# Patient Record
Sex: FEMALE | Race: WHITE | NOT HISPANIC OR LATINO | URBAN - METROPOLITAN AREA
[De-identification: names, ages, dates, MRNs, and addresses within clinical notes are randomized per-mention and may not be internally consistent; named-entity substitution may affect disease eponyms.]

---

## 2017-04-30 ENCOUNTER — EMERGENCY (EMERGENCY)
Facility: HOSPITAL | Age: 47
LOS: 1 days | Discharge: HOME | End: 2017-04-30
Attending: EMERGENCY MEDICINE | Admitting: EMERGENCY MEDICINE
Payer: SELF-PAY

## 2017-04-30 VITALS
TEMPERATURE: 98 F | DIASTOLIC BLOOD PRESSURE: 120 MMHG | SYSTOLIC BLOOD PRESSURE: 190 MMHG | OXYGEN SATURATION: 98 % | HEART RATE: 66 BPM | WEIGHT: 177.91 LBS

## 2017-04-30 VITALS
SYSTOLIC BLOOD PRESSURE: 161 MMHG | OXYGEN SATURATION: 99 % | DIASTOLIC BLOOD PRESSURE: 90 MMHG | RESPIRATION RATE: 18 BRPM | HEART RATE: 66 BPM

## 2017-04-30 DIAGNOSIS — Z98.89 OTHER SPECIFIED POSTPROCEDURAL STATES: Chronic | ICD-10-CM

## 2017-04-30 DIAGNOSIS — Z90.49 ACQUIRED ABSENCE OF OTHER SPECIFIED PARTS OF DIGESTIVE TRACT: Chronic | ICD-10-CM

## 2017-04-30 DIAGNOSIS — N83.202 UNSPECIFIED OVARIAN CYST, LEFT SIDE: ICD-10-CM

## 2017-04-30 LAB
ABO RH CONFIRMATION: SIGNIFICANT CHANGE UP
ALBUMIN SERPL ELPH-MCNC: 4 G/DL — SIGNIFICANT CHANGE UP (ref 3.3–5.2)
ALP SERPL-CCNC: 75 U/L — SIGNIFICANT CHANGE UP (ref 40–120)
ALT FLD-CCNC: 17 U/L — SIGNIFICANT CHANGE UP
ANION GAP SERPL CALC-SCNC: 14 MMOL/L — SIGNIFICANT CHANGE UP (ref 5–17)
APPEARANCE UR: CLEAR — SIGNIFICANT CHANGE UP
APTT BLD: 30.4 SEC — SIGNIFICANT CHANGE UP (ref 27.5–37.4)
AST SERPL-CCNC: 14 U/L — SIGNIFICANT CHANGE UP
BACTERIA # UR AUTO: ABNORMAL
BASOPHILS # BLD AUTO: 0 K/UL — SIGNIFICANT CHANGE UP (ref 0–0.2)
BASOPHILS NFR BLD AUTO: 0.1 % — SIGNIFICANT CHANGE UP (ref 0–2)
BILIRUB SERPL-MCNC: 0.3 MG/DL — LOW (ref 0.4–2)
BILIRUB UR-MCNC: NEGATIVE — SIGNIFICANT CHANGE UP
BLD GP AB SCN SERPL QL: SIGNIFICANT CHANGE UP
BUN SERPL-MCNC: 12 MG/DL — SIGNIFICANT CHANGE UP (ref 8–20)
CALCIUM SERPL-MCNC: 8.4 MG/DL — LOW (ref 8.6–10.2)
CHLORIDE SERPL-SCNC: 103 MMOL/L — SIGNIFICANT CHANGE UP (ref 98–107)
CO2 SERPL-SCNC: 22 MMOL/L — SIGNIFICANT CHANGE UP (ref 22–29)
COLOR SPEC: YELLOW — SIGNIFICANT CHANGE UP
CREAT SERPL-MCNC: 0.67 MG/DL — SIGNIFICANT CHANGE UP (ref 0.5–1.3)
DIFF PNL FLD: ABNORMAL
EOSINOPHIL # BLD AUTO: 0.1 K/UL — SIGNIFICANT CHANGE UP (ref 0–0.5)
EOSINOPHIL NFR BLD AUTO: 0.4 % — SIGNIFICANT CHANGE UP (ref 0–6)
EPI CELLS # UR: SIGNIFICANT CHANGE UP
GLUCOSE SERPL-MCNC: 118 MG/DL — HIGH (ref 70–115)
GLUCOSE UR QL: NEGATIVE MG/DL — SIGNIFICANT CHANGE UP
HCG SERPL-ACNC: <2 MIU/ML — SIGNIFICANT CHANGE UP
HCT VFR BLD CALC: 42 % — SIGNIFICANT CHANGE UP (ref 37–47)
HGB BLD-MCNC: 13.6 G/DL — SIGNIFICANT CHANGE UP (ref 12–16)
INR BLD: 0.96 RATIO — SIGNIFICANT CHANGE UP (ref 0.88–1.16)
KETONES UR-MCNC: ABNORMAL
LEUKOCYTE ESTERASE UR-ACNC: NEGATIVE — SIGNIFICANT CHANGE UP
LYMPHOCYTES # BLD AUTO: 1.2 K/UL — SIGNIFICANT CHANGE UP (ref 1–4.8)
LYMPHOCYTES # BLD AUTO: 7.8 % — LOW (ref 20–55)
MCHC RBC-ENTMCNC: 25.9 PG — LOW (ref 27–31)
MCHC RBC-ENTMCNC: 32.4 G/DL — SIGNIFICANT CHANGE UP (ref 32–36)
MCV RBC AUTO: 79.8 FL — LOW (ref 81–99)
MONOCYTES # BLD AUTO: 0.5 K/UL — SIGNIFICANT CHANGE UP (ref 0–0.8)
MONOCYTES NFR BLD AUTO: 3.1 % — SIGNIFICANT CHANGE UP (ref 3–10)
NEUTROPHILS # BLD AUTO: 13.2 K/UL — HIGH (ref 1.8–8)
NEUTROPHILS NFR BLD AUTO: 88.3 % — HIGH (ref 37–73)
NITRITE UR-MCNC: NEGATIVE — SIGNIFICANT CHANGE UP
PH UR: 6 — SIGNIFICANT CHANGE UP (ref 5–8)
PLATELET # BLD AUTO: 228 K/UL — SIGNIFICANT CHANGE UP (ref 150–400)
POTASSIUM SERPL-MCNC: 3.5 MMOL/L — SIGNIFICANT CHANGE UP (ref 3.5–5.3)
POTASSIUM SERPL-SCNC: 3.5 MMOL/L — SIGNIFICANT CHANGE UP (ref 3.5–5.3)
PROT SERPL-MCNC: 7.6 G/DL — SIGNIFICANT CHANGE UP (ref 6.6–8.7)
PROT UR-MCNC: 30 MG/DL
PROTHROM AB SERPL-ACNC: 10.6 SEC — SIGNIFICANT CHANGE UP (ref 9.8–12.7)
RBC # BLD: 5.26 M/UL — HIGH (ref 4.4–5.2)
RBC # FLD: 16.1 % — HIGH (ref 11–15.6)
RBC CASTS # UR COMP ASSIST: >50 /HPF (ref 0–4)
SODIUM SERPL-SCNC: 139 MMOL/L — SIGNIFICANT CHANGE UP (ref 135–145)
SP GR SPEC: 1.02 — SIGNIFICANT CHANGE UP (ref 1.01–1.02)
TYPE + AB SCN PNL BLD: SIGNIFICANT CHANGE UP
UROBILINOGEN FLD QL: NEGATIVE MG/DL — SIGNIFICANT CHANGE UP
WBC # BLD: 15 K/UL — HIGH (ref 4.8–10.8)
WBC # FLD AUTO: 15 K/UL — HIGH (ref 4.8–10.8)
WBC UR QL: SIGNIFICANT CHANGE UP

## 2017-04-30 PROCEDURE — 86850 RBC ANTIBODY SCREEN: CPT

## 2017-04-30 PROCEDURE — 76856 US EXAM PELVIC COMPLETE: CPT

## 2017-04-30 PROCEDURE — 76830 TRANSVAGINAL US NON-OB: CPT | Mod: 26

## 2017-04-30 PROCEDURE — 76830 TRANSVAGINAL US NON-OB: CPT

## 2017-04-30 PROCEDURE — 76856 US EXAM PELVIC COMPLETE: CPT | Mod: 26

## 2017-04-30 PROCEDURE — 85610 PROTHROMBIN TIME: CPT

## 2017-04-30 PROCEDURE — 86900 BLOOD TYPING SEROLOGIC ABO: CPT

## 2017-04-30 PROCEDURE — 96375 TX/PRO/DX INJ NEW DRUG ADDON: CPT

## 2017-04-30 PROCEDURE — 86901 BLOOD TYPING SEROLOGIC RH(D): CPT

## 2017-04-30 PROCEDURE — 99284 EMERGENCY DEPT VISIT MOD MDM: CPT | Mod: 25

## 2017-04-30 PROCEDURE — 96374 THER/PROPH/DIAG INJ IV PUSH: CPT

## 2017-04-30 PROCEDURE — 80053 COMPREHEN METABOLIC PANEL: CPT

## 2017-04-30 PROCEDURE — 84702 CHORIONIC GONADOTROPIN TEST: CPT

## 2017-04-30 PROCEDURE — 36415 COLL VENOUS BLD VENIPUNCTURE: CPT

## 2017-04-30 PROCEDURE — 85730 THROMBOPLASTIN TIME PARTIAL: CPT

## 2017-04-30 PROCEDURE — 85027 COMPLETE CBC AUTOMATED: CPT

## 2017-04-30 PROCEDURE — 81001 URINALYSIS AUTO W/SCOPE: CPT

## 2017-04-30 PROCEDURE — 99285 EMERGENCY DEPT VISIT HI MDM: CPT

## 2017-04-30 RX ORDER — IBUPROFEN 200 MG
1 TABLET ORAL
Qty: 15 | Refills: 0 | OUTPATIENT
Start: 2017-04-30 | End: 2017-05-05

## 2017-04-30 RX ORDER — ONDANSETRON 8 MG/1
4 TABLET, FILM COATED ORAL ONCE
Qty: 0 | Refills: 0 | Status: COMPLETED | OUTPATIENT
Start: 2017-04-30 | End: 2017-04-30

## 2017-04-30 RX ORDER — HYDROMORPHONE HYDROCHLORIDE 2 MG/ML
1 INJECTION INTRAMUSCULAR; INTRAVENOUS; SUBCUTANEOUS ONCE
Qty: 0 | Refills: 0 | Status: DISCONTINUED | OUTPATIENT
Start: 2017-04-30 | End: 2017-04-30

## 2017-04-30 RX ORDER — SODIUM CHLORIDE 9 MG/ML
1000 INJECTION INTRAMUSCULAR; INTRAVENOUS; SUBCUTANEOUS
Qty: 0 | Refills: 0 | Status: DISCONTINUED | OUTPATIENT
Start: 2017-04-30 | End: 2017-04-30

## 2017-04-30 RX ORDER — KETOROLAC TROMETHAMINE 30 MG/ML
30 SYRINGE (ML) INJECTION ONCE
Qty: 0 | Refills: 0 | Status: DISCONTINUED | OUTPATIENT
Start: 2017-04-30 | End: 2017-04-30

## 2017-04-30 RX ORDER — MORPHINE SULFATE 50 MG/1
6 CAPSULE, EXTENDED RELEASE ORAL ONCE
Qty: 0 | Refills: 0 | Status: DISCONTINUED | OUTPATIENT
Start: 2017-04-30 | End: 2017-04-30

## 2017-04-30 RX ADMIN — MORPHINE SULFATE 6 MILLIGRAM(S): 50 CAPSULE, EXTENDED RELEASE ORAL at 11:04

## 2017-04-30 RX ADMIN — ONDANSETRON 4 MILLIGRAM(S): 8 TABLET, FILM COATED ORAL at 11:03

## 2017-04-30 RX ADMIN — Medication 30 MILLIGRAM(S): at 11:03

## 2017-04-30 RX ADMIN — HYDROMORPHONE HYDROCHLORIDE 1 MILLIGRAM(S): 2 INJECTION INTRAMUSCULAR; INTRAVENOUS; SUBCUTANEOUS at 13:47

## 2017-04-30 RX ADMIN — Medication 0.1 MILLIGRAM(S): at 11:06

## 2017-04-30 NOTE — ED STATDOCS - PROGRESS NOTE DETAILS
patient re-evaluated c/o vaginal bleeding x 1 day, states usually gets cramping during her menses, however pain worsened today, took 400mg of IBU with no relief x 12 hours ago, she notes has D&C Karl with GYN  for "fibroids."  She admits to nausea.  She feels dehydrated and weak.  She notes has been changing her pads every hour and passing clots.  She denies any fevers, sick contacts or recent travel or rashes.  PE: pelvic exam: +pooling of blood noted with clots at cervix, +adenexal tenderness, no foul odor, no CMT.  Pending US results. US shows 2 complex cysts, patient recently lost insurance, reports pain calmed down however still in discomfort, "states feels as though having contractions" will call GYN for evaluation. GYN evaluated patient will speak to attending Gus GYN evaluated patient will speak to attending Gus, blood pressure improved, will need to f/u with [facility] GYN consulted appreciated to f/u with [facility] and IBU PRN as needed.

## 2017-04-30 NOTE — CONSULT NOTE ADULT - ASSESSMENT
47 y/o  w PMH of HTN (not compliant w medications since she no longer has medical insurance), chronic smoker (0.5-1ppd x 30 years), EtOH use socially and recreational marijuana use w lower left abdominal pain.

## 2017-04-30 NOTE — ED STATDOCS - OBJECTIVE STATEMENT
45 y/o female smoker with hx of D&C (January 15 2016) and HTN (on no medication) presents to ED, with family at bedside, c/o vaginal bleed, severe abd cramps, vomiting, nausea with associated light-headedness and diaphoresis onset yesterday. The cramps, nausea and vomiting, started shortly after the vaginal bleed. Pt reports she has decreased appetite secondary to pain. She states that she has had abd cramps with her period previous, but this pain is severe. Denies dysuria, fever, chills, diarrhea, back pain. She states she may be pregnant. No further complaints at this time. OBGYN: Rodo Carr

## 2017-04-30 NOTE — CONSULT NOTE ADULT - PROBLEM SELECTOR RECOMMENDATION 9
Patient needs to follow up outpatient w Gyn ([facility] clinic if patient chooses to since she does not have insurance) for follow up sonogram in 2 months to evaluate for resolution of cysts.  ([facility] [phone]; located at [facility], [place], [place])  Ibuprofen 600mg PO Q6h PRN for pain  Drink plenty of fluids

## 2017-04-30 NOTE — CONSULT NOTE ADULT - SUBJECTIVE AND OBJECTIVE BOX
45 y/o female w PMH of HTN (not compliant w medications since she no longer has medical insurance), chronic smoker (0.5-1ppd x 30 years), EtOH use socially and recreational marijuana use comes in c/o Left LQ abdominal pain for the past day.  Patient states that her period started yesterday and she was having crampy abdominal pain all day but the pain started to intensify all day and at night when she was trying to go to sleep, the pain increased to a point of 10/10 in intensity, non-radiating, crampy pain that would not alleviate w ibuprofen.   The pain was causing her to feel nauseas and she vomited once this A.M.   Patient states she finally could not resist the pain any longer and decided to come into the ED for evaluation.    Vital Signs Last 24 Hrs  T(C): 36.4, Max: 36.4 (04-30 @ 09:25)  T(F): 97.6, Max: 97.6 (04-30 @ 09:25)  HR: 66 (66 - 66)  BP: 161/90 (161/90 - 190/120)  RR: 18 (18 - 18)  SpO2: 99% (98% - 99%)    Constitutional: Obese female patient in NAD, looks comfortable and is very talkative.    HEENT:  EOMI, clear conjunctiva,    Neck:  No JVD, bruits or thyromegaly    Respiratory:  Clear without rales or rhonchi    Cardiovascular:  RR without murmur, rub or gallop.    Gastrointestinal: Obese abdomen, soft without hepatosplenomegaly or masses, mild tenderness in Left LQ    : normal external genitalia, dried blood on external genitalia, blood in vaginal vault, no clots, cervical os appreciated w active menstrual bleed, no discharge.  No CMT, no adnexal masses, uterus normal size, mild tenderness in Left adnexal area.    Extremities: without cyanosis, clubbing or edema.    Neurological:  Oriented   x  3. No gross sensory or motor defects.    Comprehensive Metabolic Panel (04.30.17 @ 10:54)    Sodium, Serum: 139 mmol/L    Potassium, Serum: 3.5 mmol/L    Chloride, Serum: 103 mmol/L    Carbon Dioxide, Serum: 22.0 mmol/L    Anion Gap, Serum: 14 mmol/L    Blood Urea Nitrogen, Serum: 12.0 mg/dL    Creatinine, Serum: 0.67 mg/dL    Glucose, Serum: 118 mg/dL    Calcium, Total Serum: 8.4 mg/dL    Protein Total, Serum: 7.6 g/dL    Albumin, Serum: 4.0 g/dL    Bilirubin Total, Serum: 0.3 mg/dL    Alkaline Phosphatase, Serum: 75 U/L    Aspartate Aminotransferase (AST/SGOT): 14 U/L    Alanine Aminotransferase (ALT/SGPT): 17 U/L    eGFR if Non : 105: Interpretative comment  The units for eGFR are ml/min/1.73m2 (normalized body surface area). The  eGFR is calculated from a serum creatinine using the CKD-EPI equation.  Other variables required for calculation are race, age and sex. Among  patients w105: ith chronic kidney disease (CKD), the eGFR is useful in  determining the stage of disease according to KDOQI CKD classification.  All eGFR results are reported numerically with the following  interpretation.          GFR                    [id number]:                  Without     (ml/min/1.73 m2)    Kidney Damage       Kidney Damage        >= 90                    Stage 1                     Normal        60-89                    Stage 2                     Decreased GFR        30-05064:      Stage 3                     Stage 3        15-29                    Stage 4                     Stage 4        < 15                      Stage 5                     Stage 5  Each stage of CKD assumes that the associated GFR level has been in  [id number]: ect for at least 3 months. Determination of stages one and two (with  eGFR > 59 ml/min/m2) requires estimation of kidney damage for at least 3  months as defined by structural or functional abnormalities.  Limitations: All estimates of GFR will be [id number]: s accurate for patients at  extremes of muscle mass (including but not limited to frail elderly,  critically ill, or cancer patients), those with unusual diets, and those  with conditions associated with reduced secretion or extrarenal  elimination of105:  creatinine. The eGFR equation is not recommended for use  in patients with unstable creatinine levels. mL/min/1.73M2    eGFR if African American: 122 mL/min/1.73M2    Complete Blood Count + Automated Diff (04.30.17 @ 10:54)    WBC Count: 15.0 K/uL    RBC Count: 5.26 M/uL    Hemoglobin: 13.6 g/dL    Hematocrit: 42.0 %    Mean Cell Volume: 79.8 fl    Mean Cell Hemoglobin: 25.9 pg    Mean Cell Hemoglobin Conc: 32.4 g/dL    Red Cell Distrib Width: 16.1 %    Platelet Count - Automated: 228 K/uL    Auto Neutrophil #: 13.2 K/uL    Auto Lymphocyte #: 1.2 K/uL    Auto Monocyte #: 0.5 K/uL    Auto Eosinophil #: 0.1 K/uL    Auto Basophil #: 0.0 K/uL    Auto Neutrophil %: 88.3: Differential percentages must be correlated with absolute numbers for  clinical significance. %    Auto Lymphocyte %: 7.8 %    Auto Monocyte %: 3.1 %    Auto Eosinophil %: 0.4 %    Auto Basophil %: 0.1 %     EXAM:  US PELVIC COMPLETE                         EXAM:  US TRANSVAGINAL                          PROCEDURE DATE:  04/30/2017        INTERPRETATION:  History: Vaginal bleeding for 5 days, severe pelvic   pain. Status post D&C in January for polyps.    Findings: Transabdominal and transvaginal pelvic sonogram was performed.    Correlation is made with a prior study 1/5/2016.    The uterus measures approximately 8.6 x 5.5 x 5.2 cm. Question mild   heterogeneity / intramural myoma posterior upperbody of the uterus   measuring up to 1.9 cm. Homogeneous endometrial echo stripe complex   measuring up to 1 cm in thickness. The cervix is unremarkable in   appearance.    The right ovary is unremarkable in appearance measuring 2.8 x 2.1 x 2.7   cm.    Complex left adnexa measuring approximately 3.7 x 2.4 x 2.3 cm containing   2 possible crenated cysts, larger measuring up to approximately 2 cm.     Vascular flow demonstrated bilateral ovaries.    No free fluid in the cul-de-sac.    Impression:    Complex left adnexa containing two possible crenated cysts. Recommend   follow-up sonogram after completion of 2 menstrual cycles to assess for   their resolution.

## 2017-04-30 NOTE — ED STATDOCS - NS ED MD SCRIBE ATTENDING SCRIBE SECTIONS
VITAL SIGNS( Pullset)/INTAKE ASSESSMENT/SCREENINGS/PAST MEDICAL/SURGICAL/SOCIAL HISTORY/REVIEW OF SYSTEMS/DISPOSITION/HISTORY OF PRESENT ILLNESS/PHYSICAL EXAM/HIV

## 2018-07-22 ENCOUNTER — EMERGENCY (EMERGENCY)
Facility: HOSPITAL | Age: 48
LOS: 1 days | Discharge: HOME | End: 2018-07-22
Attending: EMERGENCY MEDICINE
Payer: SELF-PAY

## 2018-07-22 VITALS
SYSTOLIC BLOOD PRESSURE: 200 MMHG | RESPIRATION RATE: 18 BRPM | HEART RATE: 86 BPM | DIASTOLIC BLOOD PRESSURE: 122 MMHG | TEMPERATURE: 99 F | OXYGEN SATURATION: 96 %

## 2018-07-22 VITALS — HEIGHT: 66 IN | WEIGHT: 229.94 LBS

## 2018-07-22 DIAGNOSIS — Z98.89 OTHER SPECIFIED POSTPROCEDURAL STATES: Chronic | ICD-10-CM

## 2018-07-22 DIAGNOSIS — Z90.49 ACQUIRED ABSENCE OF OTHER SPECIFIED PARTS OF DIGESTIVE TRACT: Chronic | ICD-10-CM

## 2018-07-22 PROCEDURE — 99283 EMERGENCY DEPT VISIT LOW MDM: CPT

## 2018-07-22 RX ADMIN — Medication 40 MILLIGRAM(S): at 15:59

## 2018-07-22 NOTE — ED PROVIDER NOTE - OBJECTIVE STATEMENT
48 y/o f presenting with pruritic rash x 1 month that has worsened 46 y/o f presenting with pruritic rash x 1 month that has worsened over the past week. She admits to using steroid cream which worsened the rash. Rash became inflamed and more erythematous after steroid application of 1% hydrocortisone. She denies fever, chills, new soaps, detergents, lotions, foods

## 2018-07-22 NOTE — ED PROVIDER NOTE - MEDICAL DECISION MAKING DETAILS
rash x 1month likely fungal given history and appearance will d/c on oral steroids and antifungal cream with follow up with Dermatology

## 2018-07-22 NOTE — ED ADULT TRIAGE NOTE - CHIEF COMPLAINT QUOTE
Patient is awake and oriented times 4, arrives ambulatory from home and complains a rash all over my body

## 2018-07-22 NOTE — ED PROVIDER NOTE - ATTENDING CONTRIBUTION TO CARE
48 y/o female seen and evaluated with resident  presents to ED for rash x 4 weeks  treating with steroid, no improvement, worsening?  admits to itchiness, no known allergies   with rash also  vitals reviewed, exam as documented  fungal vs. allergic component  treat with lotrimin, benadryl  derm referral

## 2018-09-13 ENCOUNTER — APPOINTMENT (OUTPATIENT)
Dept: MAMMOGRAPHY | Facility: CLINIC | Age: 48
End: 2018-09-13
Payer: MEDICAID

## 2018-09-13 ENCOUNTER — OUTPATIENT (OUTPATIENT)
Dept: OUTPATIENT SERVICES | Facility: HOSPITAL | Age: 48
LOS: 1 days | End: 2018-09-13
Payer: MEDICAID

## 2018-09-13 DIAGNOSIS — Z12.31 ENCOUNTER FOR SCREENING MAMMOGRAM FOR MALIGNANT NEOPLASM OF BREAST: ICD-10-CM

## 2018-09-13 DIAGNOSIS — Z98.89 OTHER SPECIFIED POSTPROCEDURAL STATES: Chronic | ICD-10-CM

## 2018-09-13 DIAGNOSIS — Z90.49 ACQUIRED ABSENCE OF OTHER SPECIFIED PARTS OF DIGESTIVE TRACT: Chronic | ICD-10-CM

## 2018-09-13 PROCEDURE — 77063 BREAST TOMOSYNTHESIS BI: CPT | Mod: 26

## 2018-09-13 PROCEDURE — 77063 BREAST TOMOSYNTHESIS BI: CPT

## 2018-09-13 PROCEDURE — 77067 SCR MAMMO BI INCL CAD: CPT

## 2018-09-13 PROCEDURE — 77067 SCR MAMMO BI INCL CAD: CPT | Mod: 26

## 2018-09-17 ENCOUNTER — OUTPATIENT (OUTPATIENT)
Dept: OUTPATIENT SERVICES | Facility: HOSPITAL | Age: 48
LOS: 1 days | End: 2018-09-17
Payer: MEDICAID

## 2018-09-17 ENCOUNTER — APPOINTMENT (OUTPATIENT)
Dept: ULTRASOUND IMAGING | Facility: CLINIC | Age: 48
End: 2018-09-17
Payer: MEDICAID

## 2018-09-17 DIAGNOSIS — Z98.89 OTHER SPECIFIED POSTPROCEDURAL STATES: Chronic | ICD-10-CM

## 2018-09-17 DIAGNOSIS — Z90.49 ACQUIRED ABSENCE OF OTHER SPECIFIED PARTS OF DIGESTIVE TRACT: Chronic | ICD-10-CM

## 2018-09-17 DIAGNOSIS — R92.8 OTHER ABNORMAL AND INCONCLUSIVE FINDINGS ON DIAGNOSTIC IMAGING OF BREAST: ICD-10-CM

## 2018-09-17 PROCEDURE — 76642 ULTRASOUND BREAST LIMITED: CPT

## 2018-09-17 PROCEDURE — 76642 ULTRASOUND BREAST LIMITED: CPT | Mod: 26,RT

## 2019-01-22 ENCOUNTER — APPOINTMENT (OUTPATIENT)
Dept: RHEUMATOLOGY | Facility: CLINIC | Age: 49
End: 2019-01-22

## 2019-06-05 NOTE — ED PROVIDER NOTE - CONTEXT
The wound was explored to base in bloodless field./No foreign body/Nail was repaired./Non-extensive debridement was performed.
unknown

## 2021-04-12 ENCOUNTER — APPOINTMENT (OUTPATIENT)
Dept: DERMATOLOGY | Facility: CLINIC | Age: 51
End: 2021-04-12

## 2021-04-20 ENCOUNTER — EMERGENCY (EMERGENCY)
Facility: HOSPITAL | Age: 51
LOS: 1 days | Discharge: HOME | End: 2021-04-20
Attending: EMERGENCY MEDICINE
Payer: COMMERCIAL

## 2021-04-20 VITALS
HEIGHT: 66 IN | RESPIRATION RATE: 20 BRPM | TEMPERATURE: 98 F | WEIGHT: 265 LBS | OXYGEN SATURATION: 98 % | DIASTOLIC BLOOD PRESSURE: 74 MMHG | SYSTOLIC BLOOD PRESSURE: 137 MMHG | HEART RATE: 83 BPM

## 2021-04-20 DIAGNOSIS — Z98.89 OTHER SPECIFIED POSTPROCEDURAL STATES: Chronic | ICD-10-CM

## 2021-04-20 DIAGNOSIS — Z90.49 ACQUIRED ABSENCE OF OTHER SPECIFIED PARTS OF DIGESTIVE TRACT: Chronic | ICD-10-CM

## 2021-04-20 PROCEDURE — 96365 THER/PROPH/DIAG IV INF INIT: CPT | Mod: XU

## 2021-04-20 PROCEDURE — 73130 X-RAY EXAM OF HAND: CPT

## 2021-04-20 PROCEDURE — 12002 RPR S/N/AX/GEN/TRNK2.6-7.5CM: CPT | Mod: XU

## 2021-04-20 PROCEDURE — 29125 APPL SHORT ARM SPLINT STATIC: CPT

## 2021-04-20 PROCEDURE — 99284 EMERGENCY DEPT VISIT MOD MDM: CPT | Mod: 25

## 2021-04-20 PROCEDURE — 12002 RPR S/N/AX/GEN/TRNK2.6-7.5CM: CPT | Mod: 59

## 2021-04-20 PROCEDURE — 90471 IMMUNIZATION ADMIN: CPT

## 2021-04-20 PROCEDURE — 73130 X-RAY EXAM OF HAND: CPT | Mod: 26,LT

## 2021-04-20 PROCEDURE — 90715 TDAP VACCINE 7 YRS/> IM: CPT

## 2021-04-20 RX ORDER — TETANUS TOXOID, REDUCED DIPHTHERIA TOXOID AND ACELLULAR PERTUSSIS VACCINE, ADSORBED 5; 2.5; 8; 8; 2.5 [IU]/.5ML; [IU]/.5ML; UG/.5ML; UG/.5ML; UG/.5ML
0.5 SUSPENSION INTRAMUSCULAR ONCE
Refills: 0 | Status: COMPLETED | OUTPATIENT
Start: 2021-04-20 | End: 2021-04-20

## 2021-04-20 RX ORDER — AMPICILLIN SODIUM AND SULBACTAM SODIUM 250; 125 MG/ML; MG/ML
3 INJECTION, POWDER, FOR SUSPENSION INTRAMUSCULAR; INTRAVENOUS ONCE
Refills: 0 | Status: COMPLETED | OUTPATIENT
Start: 2021-04-20 | End: 2021-04-20

## 2021-04-20 RX ORDER — OXYCODONE AND ACETAMINOPHEN 5; 325 MG/1; MG/1
1 TABLET ORAL ONCE
Refills: 0 | Status: DISCONTINUED | OUTPATIENT
Start: 2021-04-20 | End: 2021-04-20

## 2021-04-20 RX ADMIN — Medication 1 TABLET(S): at 15:59

## 2021-04-20 RX ADMIN — OXYCODONE AND ACETAMINOPHEN 1 TABLET(S): 5; 325 TABLET ORAL at 15:59

## 2021-04-20 RX ADMIN — AMPICILLIN SODIUM AND SULBACTAM SODIUM 3 GRAM(S): 250; 125 INJECTION, POWDER, FOR SUSPENSION INTRAMUSCULAR; INTRAVENOUS at 17:05

## 2021-04-20 RX ADMIN — OXYCODONE AND ACETAMINOPHEN 1 TABLET(S): 5; 325 TABLET ORAL at 16:25

## 2021-04-20 RX ADMIN — AMPICILLIN SODIUM AND SULBACTAM SODIUM 200 GRAM(S): 250; 125 INJECTION, POWDER, FOR SUSPENSION INTRAMUSCULAR; INTRAVENOUS at 16:32

## 2021-04-20 RX ADMIN — TETANUS TOXOID, REDUCED DIPHTHERIA TOXOID AND ACELLULAR PERTUSSIS VACCINE, ADSORBED 0.5 MILLILITER(S): 5; 2.5; 8; 8; 2.5 SUSPENSION INTRAMUSCULAR at 15:59

## 2021-04-20 NOTE — ED PROCEDURE NOTE - CPROC ED POST PROC CARE GUIDE1
Verbal/written post procedure instructions were given to patient/caregiver./Instructed patient/caregiver to follow-up with primary care physician./Instructed patient/caregiver regarding signs and symptoms of infection./Elevate the injured extremity as instructed./Keep the cast/splint/dressing clean and dry.
Verbal/written post procedure instructions were given to patient/caregiver./Instructed patient/caregiver to follow-up with primary care physician./Instructed patient/caregiver regarding signs and symptoms of infection.

## 2021-04-20 NOTE — ED PROVIDER NOTE - CARE PROVIDER_API CALL
Sahara Vickers)  Orthopaedic Surgery; Surgery of the Hand  [address] [place] [address]  Phone: [phone]  Fax: [phone]  Follow Up Time:

## 2021-04-20 NOTE — ED ADULT NURSE NOTE - OBJECTIVE STATEMENT
50y female AOx4 c/o left hand dog bite today, unsure of whether it was her dog or stray as they were fighting. pt admits to numbness to left hand. respirations even and unlabored. denies chest discomfort. abd soft and nondistended. skin WNL for race.

## 2021-04-20 NOTE — ED PROVIDER NOTE - PATIENT PORTAL LINK FT
You can access the FollowMyHealth Patient Portal offered by [facility] by registering at the following website: [email or web address]. By joining "[email or web address]"’s FollowMyHealth portal, you will also be able to view your health information using other applications (apps) compatible with our system.

## 2021-04-20 NOTE — ED PROVIDER NOTE - ATTENDING CONTRIBUTION TO CARE
Dr. Mesa : I have personally seen and examined this patient at the bedside. I have fully participated in the care of this patient. I have reviewed all pertinent clinical information, including history, physical exam, plan and the PA's note and agree except as noted.     49 y/o f, denies PMH, presents to ED c/o L hand dog bite. Pt states her dog was fighting with a local stray and she was bit in the L hand while attempting to break up the fight - note sure which dog bit her. notes that has seen the stray dog in the past who was never aggressive before. +laceration to her L palm and a puncture would to the back of her L hand. Pt is unsure of the timing of her last tetanus vaccine.. Denies paresthesias, cold digits, loss of function of hand.    Denies f/c/n/v/cp/sob/palpitations/cough/abd.pain/d/c/dysuria/hematuria. no sick contacts/recent travel.    PE:  3cm linear laceration thenar eminence of L hand. 1cm lac posterior wrist. cap refll <2sec, sensation intact from of all digits    -->dog bite; will do loose stitches xray to eval for fx, abx hand referal (dr. bonner will see pt in the office tomorrow)    -->

## 2021-04-20 NOTE — ED PROVIDER NOTE - PHYSICAL EXAMINATION
Skin: 3cm laceration thenar eminence of L hand. 1cm lac posterior wrist.     Vasc: Cap refill < 2 secs. + 2 radial pulses    MSK: Full ROM and 5/5 str digits 1-5 L hand

## 2021-04-20 NOTE — ED PROVIDER NOTE - NSFOLLOWUPINSTRUCTIONS_ED_ALL_ED_FT
Call Dr. Vickers in AM to arrange follow up appointment     Return to ED in 10 days for suture removal    Fracture    A fracture is a break in one of your bones. This can occur from a variety of injuries, especially traumatic ones. Symptoms include pain, bruising, or swelling. Do not use the injured limb. If a fracture is in one of the bones below your waist, do not put weight on that limb unless instructed to do so by your healthcare provider. Crutches or a cane may have been provided. A splint or cast may have been applied by your health care provider. Make sure to keep it dry and follow up with an orthopedist as instructed.    SEEK IMMEDIATE MEDICAL CARE IF YOU HAVE ANY OF THE FOLLOWING SYMPTOMS: numbness, tingling, increasing pain, or weakness in any part of the injured limb.    Laceration    A laceration is a cut that goes through all of the layers of the skin and into the tissue that is right under the skin. Some lacerations heal on their own. Others need to be closed with skin adhesive strips, skin glue, stitches (sutures), or staples. Proper laceration care minimizes the risk of infection and helps the laceration to heal better.  If non-absorbable stitches or staples have been placed, they must be taken out within the time frame instructed by your healthcare provider.    SEEK IMMEDIATE MEDICAL CARE IF YOU HAVE ANY OF THE FOLLOWING SYMPTOMS: swelling around the wound, worsening pain, drainage from the wound, red streaking going away from your wound, inability to move finger or toe near the laceration, or discoloration of skin near the laceration.

## 2021-04-20 NOTE — ED PROVIDER NOTE - OBJECTIVE STATEMENT
Pt is a 49 y/o f, denies PMH, presents to ED c/o L hand dog bite. Pt states her dog was fighting with a local stray and she was bit in the L hand while attempting to break up the fight. Pt reports sustaining a laceration to her L palm and a puncture would to the back of her L hand. Pt is unsure of the timing of her last tetanus vaccine. Pt states her dog is UTD on immunizations and states the other Dog can be monitored. No abnormal behavior from either dog was exhibted prior to the event. Denies paresthesias, cold digits, loss of function of hand.

## 2021-04-20 NOTE — ED PROVIDER NOTE - PROGRESS NOTE DETAILS
PA NOTE: + proximal fx 1st metacarpal bone. Case discussed with Dr. Vickers, pt to receive IV Unasyn and follow up in the office this week. Pt given strict return precautions and verbalized understanding.

## 2024-09-18 ENCOUNTER — NON-APPOINTMENT (OUTPATIENT)
Age: 54
End: 2024-09-18

## 2024-09-19 ENCOUNTER — APPOINTMENT (OUTPATIENT)
Dept: NEUROSURGERY | Facility: CLINIC | Age: 54
End: 2024-09-19
Payer: MEDICAID

## 2024-09-19 VITALS
DIASTOLIC BLOOD PRESSURE: 88 MMHG | SYSTOLIC BLOOD PRESSURE: 156 MMHG | WEIGHT: 293 LBS | HEART RATE: 86 BPM | OXYGEN SATURATION: 93 % | TEMPERATURE: 97 F | HEIGHT: 66 IN | BODY MASS INDEX: 47.09 KG/M2

## 2024-09-19 DIAGNOSIS — Z82.49 FAMILY HISTORY OF ISCHEMIC HEART DISEASE AND OTHER DISEASES OF THE CIRCULATORY SYSTEM: ICD-10-CM

## 2024-09-19 DIAGNOSIS — Z83.438 FAMILY HISTORY OF OTHER DISORDER OF LIPOPROTEIN METABOLISM AND OTHER LIPIDEMIA: ICD-10-CM

## 2024-09-19 DIAGNOSIS — M54.50 LOW BACK PAIN, UNSPECIFIED: ICD-10-CM

## 2024-09-19 DIAGNOSIS — Z78.9 OTHER SPECIFIED HEALTH STATUS: ICD-10-CM

## 2024-09-19 PROCEDURE — 99203 OFFICE O/P NEW LOW 30 MIN: CPT

## 2024-09-19 RX ORDER — LISINOPRIL AND HYDROCHLOROTHIAZIDE TABLETS 20; 25 MG/1; MG/1
20-25 TABLET ORAL
Refills: 0 | Status: ACTIVE | COMMUNITY

## 2024-09-19 RX ORDER — IBUPROFEN 800 MG/1
800 TABLET, FILM COATED ORAL
Refills: 0 | Status: ACTIVE | COMMUNITY

## 2024-09-19 RX ORDER — OXYCODONE 10 MG/1
10 TABLET ORAL
Refills: 0 | Status: ACTIVE | COMMUNITY

## 2024-09-19 RX ORDER — METFORMIN HYDROCHLORIDE 500 MG/1
500 TABLET, COATED ORAL
Refills: 0 | Status: ACTIVE | COMMUNITY

## 2024-09-19 RX ORDER — SERTRALINE HYDROCHLORIDE 50 MG/1
50 TABLET, FILM COATED ORAL
Refills: 0 | Status: ACTIVE | COMMUNITY

## 2024-09-19 RX ORDER — ROPINIROLE 1 MG/1
1 TABLET, FILM COATED ORAL
Refills: 0 | Status: ACTIVE | COMMUNITY

## 2024-09-19 RX ORDER — ALPRAZOLAM 0.5 MG/1
0.5 TABLET ORAL
Refills: 0 | Status: ACTIVE | COMMUNITY

## 2024-09-19 RX ORDER — CYCLOBENZAPRINE HYDROCHLORIDE 10 MG/1
10 TABLET, FILM COATED ORAL
Refills: 0 | Status: ACTIVE | COMMUNITY

## 2024-09-19 NOTE — PHYSICAL EXAM
[de-identified] : Patient is awake and alert. Accompanied by her partner.  Well appearing in no acute distress Patient is interactive and appropriate RUE 5/5 Deltoid/Biceps/Triceps/WE/WF//IO LUE 5/5 Deltoid/Biceps/Triceps/WE/WF, 4+/5  secondary to left hand injury in 2021, 5/5 IO RLE 5/5 HF/KE/KF/DF/PF/EHL LLE 5/5 HF/KE/KF/DF/PF/EHL Palpable DP Pulses bilaterally  Sensation intact to light touch Negative Kurtz's bilaterally Negative clonus bilaterally Negative straight leg raise bilaterally  Narrow-based gait within normal limits reflexes 2+
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
## 2024-09-19 NOTE — ASSESSMENT
[FreeTextEntry1] : Ms. Kathy Yadav is a very pleasant 53 y/o female with a PMHx of HTN, diabetes mellitus (on Metformin, last A1C 8.6%), obesity (following bariatric surgery for potential weight loss surgery), who is accompanied by her partner César, presents today for a neurosurgical consultation for lower back pain x 10 years, which has been worsening the last year.  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  On exam she remains 5/5 in bilateral lower extremities without electric findings or hyperreflexia.  We discussed she also potentially has some aspect of neurogenic claudication as she feels that she has increased fatigue in bilateral lower extremities when walking which seems to be somewhat improved when leaning forward.  We discussed overall that the first-line treatment for lumbar spondylotic disease is usually physical therapy and pain management.  We discussed that in her case, she would likely significantly benefit from weight loss and continue follow-up with bariatric surgery as she has been doing.  We discussed having her follow-up in 4 to 6 weeks after starting physical therapy to assess her overall improvement.  We discussed that an MRI L-spine without contrast could be indicated after 4 to 6 weeks of trying physical therapy to better understand the degree of lumbar spondylotic changes but also discussed that given her medical committees at this time, and lack of any alarm symptoms including naseem weakness in the legs or any bowel or bladder incontinence or retention or any saddle anesthesia, she would likely not be a surgical candidate even if her MRI demonstrates a significant degree of lumbar stenosis.  We also continue to urge her to follow-up with her other medical specialist regarding her other medical comorbidities.  All questions were answered.   Plan: - Continue follow up with Pulmonology, Cardiology, Bariatric Surgery  - Discussed importance of weight loss for overall health, as well as the potential weight loss to help alleviate her lower back symptoms  - Referral placed to physical therapy  - Follow up in 4-6 weeks, would consider MRI Lumbar Spine at next evaluation if symptoms persist

## 2024-11-18 ENCOUNTER — APPOINTMENT (OUTPATIENT)
Dept: NEUROSURGERY | Facility: CLINIC | Age: 54
End: 2024-11-18

## 2024-11-27 ENCOUNTER — INPATIENT (INPATIENT)
Facility: HOSPITAL | Age: 54
LOS: 5 days | Discharge: HOME | DRG: 684 | End: 2024-12-03
Attending: HOSPITALIST | Admitting: HOSPITALIST
Payer: COMMERCIAL

## 2024-11-27 VITALS
TEMPERATURE: 98 F | SYSTOLIC BLOOD PRESSURE: 100 MMHG | WEIGHT: 293 LBS | OXYGEN SATURATION: 96 % | RESPIRATION RATE: 18 BRPM | HEART RATE: 113 BPM | DIASTOLIC BLOOD PRESSURE: 68 MMHG

## 2024-11-27 DIAGNOSIS — Z98.89 OTHER SPECIFIED POSTPROCEDURAL STATES: Chronic | ICD-10-CM

## 2024-11-27 DIAGNOSIS — Z90.49 ACQUIRED ABSENCE OF OTHER SPECIFIED PARTS OF DIGESTIVE TRACT: Chronic | ICD-10-CM

## 2024-11-27 LAB
ALBUMIN SERPL ELPH-MCNC: 4.2 G/DL — SIGNIFICANT CHANGE UP (ref 3.3–5.2)
ALP SERPL-CCNC: 84 U/L — SIGNIFICANT CHANGE UP (ref 40–120)
ALT FLD-CCNC: 24 U/L — SIGNIFICANT CHANGE UP
ANION GAP SERPL CALC-SCNC: 21 MMOL/L — HIGH (ref 5–17)
APPEARANCE UR: CLEAR — SIGNIFICANT CHANGE UP
AST SERPL-CCNC: 19 U/L — SIGNIFICANT CHANGE UP
BACTERIA # UR AUTO: NEGATIVE /HPF — SIGNIFICANT CHANGE UP
BASOPHILS # BLD AUTO: 0.05 K/UL — SIGNIFICANT CHANGE UP (ref 0–0.2)
BASOPHILS NFR BLD AUTO: 0.3 % — SIGNIFICANT CHANGE UP (ref 0–2)
BILIRUB SERPL-MCNC: 0.2 MG/DL — LOW (ref 0.4–2)
BILIRUB UR-MCNC: NEGATIVE — SIGNIFICANT CHANGE UP
BUN SERPL-MCNC: 78 MG/DL — HIGH (ref 8–20)
CALCIUM SERPL-MCNC: 9.5 MG/DL — SIGNIFICANT CHANGE UP (ref 8.4–10.5)
CAST: 20 /LPF — HIGH (ref 0–4)
CHLORIDE SERPL-SCNC: 94 MMOL/L — LOW (ref 96–108)
CO2 SERPL-SCNC: 21 MMOL/L — LOW (ref 22–29)
COLOR SPEC: YELLOW — SIGNIFICANT CHANGE UP
CREAT SERPL-MCNC: 3.24 MG/DL — HIGH (ref 0.5–1.3)
DIFF PNL FLD: NEGATIVE — SIGNIFICANT CHANGE UP
EGFR: 16 ML/MIN/1.73M2 — LOW
EOSINOPHIL # BLD AUTO: 0.06 K/UL — SIGNIFICANT CHANGE UP (ref 0–0.5)
EOSINOPHIL NFR BLD AUTO: 0.3 % — SIGNIFICANT CHANGE UP (ref 0–6)
GLUCOSE SERPL-MCNC: 188 MG/DL — HIGH (ref 70–99)
GLUCOSE UR QL: NEGATIVE MG/DL — SIGNIFICANT CHANGE UP
HCT VFR BLD CALC: 39.8 % — SIGNIFICANT CHANGE UP (ref 34.5–45)
HGB BLD-MCNC: 12.5 G/DL — SIGNIFICANT CHANGE UP (ref 11.5–15.5)
IMM GRANULOCYTES NFR BLD AUTO: 0.7 % — SIGNIFICANT CHANGE UP (ref 0–0.9)
KETONES UR-MCNC: NEGATIVE MG/DL — SIGNIFICANT CHANGE UP
LEUKOCYTE ESTERASE UR-ACNC: NEGATIVE — SIGNIFICANT CHANGE UP
LIDOCAIN IGE QN: 42 U/L — SIGNIFICANT CHANGE UP (ref 22–51)
LYMPHOCYTES # BLD AUTO: 1.95 K/UL — SIGNIFICANT CHANGE UP (ref 1–3.3)
LYMPHOCYTES # BLD AUTO: 10.6 % — LOW (ref 13–44)
MCHC RBC-ENTMCNC: 24.5 PG — LOW (ref 27–34)
MCHC RBC-ENTMCNC: 31.4 G/DL — LOW (ref 32–36)
MCV RBC AUTO: 77.9 FL — LOW (ref 80–100)
MONOCYTES # BLD AUTO: 0.84 K/UL — SIGNIFICANT CHANGE UP (ref 0–0.9)
MONOCYTES NFR BLD AUTO: 4.5 % — SIGNIFICANT CHANGE UP (ref 2–14)
NEUTROPHILS # BLD AUTO: 15.44 K/UL — HIGH (ref 1.8–7.4)
NEUTROPHILS NFR BLD AUTO: 83.6 % — HIGH (ref 43–77)
NITRITE UR-MCNC: NEGATIVE — SIGNIFICANT CHANGE UP
PH UR: 5.5 — SIGNIFICANT CHANGE UP (ref 5–8)
PLATELET # BLD AUTO: 345 K/UL — SIGNIFICANT CHANGE UP (ref 150–400)
POTASSIUM SERPL-MCNC: 3.8 MMOL/L — SIGNIFICANT CHANGE UP (ref 3.5–5.3)
POTASSIUM SERPL-SCNC: 3.8 MMOL/L — SIGNIFICANT CHANGE UP (ref 3.5–5.3)
PROT SERPL-MCNC: 8 G/DL — SIGNIFICANT CHANGE UP (ref 6.6–8.7)
PROT UR-MCNC: 30 MG/DL
RBC # BLD: 5.11 M/UL — SIGNIFICANT CHANGE UP (ref 3.8–5.2)
RBC # FLD: 17.4 % — HIGH (ref 10.3–14.5)
RBC CASTS # UR COMP ASSIST: 1 /HPF — SIGNIFICANT CHANGE UP (ref 0–4)
SODIUM SERPL-SCNC: 136 MMOL/L — SIGNIFICANT CHANGE UP (ref 135–145)
SP GR SPEC: 1.02 — SIGNIFICANT CHANGE UP (ref 1–1.03)
SQUAMOUS # UR AUTO: 5 /HPF — SIGNIFICANT CHANGE UP (ref 0–5)
TROPONIN T, HIGH SENSITIVITY RESULT: 21 NG/L — SIGNIFICANT CHANGE UP (ref 0–51)
TROPONIN T, HIGH SENSITIVITY RESULT: 21 NG/L — SIGNIFICANT CHANGE UP (ref 0–51)
UROBILINOGEN FLD QL: 0.2 MG/DL — SIGNIFICANT CHANGE UP (ref 0.2–1)
WBC # BLD: 18.47 K/UL — HIGH (ref 3.8–10.5)
WBC # FLD AUTO: 18.47 K/UL — HIGH (ref 3.8–10.5)
WBC UR QL: 3 /HPF — SIGNIFICANT CHANGE UP (ref 0–5)

## 2024-11-27 PROCEDURE — 74176 CT ABD & PELVIS W/O CONTRAST: CPT | Mod: 26,MC

## 2024-11-27 PROCEDURE — 76705 ECHO EXAM OF ABDOMEN: CPT | Mod: 26

## 2024-11-27 PROCEDURE — 71045 X-RAY EXAM CHEST 1 VIEW: CPT | Mod: 26

## 2024-11-27 PROCEDURE — 99285 EMERGENCY DEPT VISIT HI MDM: CPT

## 2024-11-27 RX ORDER — SODIUM CHLORIDE 9 MG/ML
1000 INJECTION, SOLUTION INTRAMUSCULAR; INTRAVENOUS; SUBCUTANEOUS ONCE
Refills: 0 | Status: COMPLETED | OUTPATIENT
Start: 2024-11-27 | End: 2024-11-27

## 2024-11-27 RX ORDER — PIPERACILLIN SODIUM AND TAZOBACTAM SODIUM 4; .5 G/20ML; G/20ML
3.38 INJECTION, POWDER, LYOPHILIZED, FOR SOLUTION INTRAVENOUS ONCE
Refills: 0 | Status: COMPLETED | OUTPATIENT
Start: 2024-11-27 | End: 2024-11-27

## 2024-11-27 RX ORDER — KETOROLAC TROMETHAMINE 30 MG/ML
15 INJECTION INTRAMUSCULAR; INTRAVENOUS ONCE
Refills: 0 | Status: DISCONTINUED | OUTPATIENT
Start: 2024-11-27 | End: 2024-11-27

## 2024-11-27 RX ORDER — ACETAMINOPHEN 500MG 500 MG/1
1000 TABLET, COATED ORAL ONCE
Refills: 0 | Status: COMPLETED | OUTPATIENT
Start: 2024-11-27 | End: 2024-11-27

## 2024-11-27 RX ORDER — ONDANSETRON HYDROCHLORIDE 4 MG/1
4 TABLET, FILM COATED ORAL ONCE
Refills: 0 | Status: COMPLETED | OUTPATIENT
Start: 2024-11-27 | End: 2024-11-27

## 2024-11-27 RX ADMIN — ACETAMINOPHEN 500MG 1000 MILLIGRAM(S): 500 TABLET, COATED ORAL at 16:42

## 2024-11-27 RX ADMIN — PIPERACILLIN SODIUM AND TAZOBACTAM SODIUM 200 GRAM(S): 4; .5 INJECTION, POWDER, LYOPHILIZED, FOR SOLUTION INTRAVENOUS at 20:34

## 2024-11-27 RX ADMIN — Medication 4 MILLIGRAM(S): at 21:30

## 2024-11-27 RX ADMIN — ACETAMINOPHEN 500MG 1000 MILLIGRAM(S): 500 TABLET, COATED ORAL at 16:57

## 2024-11-27 RX ADMIN — Medication 4 MILLIGRAM(S): at 20:34

## 2024-11-27 RX ADMIN — SODIUM CHLORIDE 1000 MILLILITER(S): 9 INJECTION, SOLUTION INTRAMUSCULAR; INTRAVENOUS; SUBCUTANEOUS at 20:34

## 2024-11-27 RX ADMIN — ONDANSETRON HYDROCHLORIDE 4 MILLIGRAM(S): 4 TABLET, FILM COATED ORAL at 20:34

## 2024-11-27 RX ADMIN — ACETAMINOPHEN 500MG 400 MILLIGRAM(S): 500 TABLET, COATED ORAL at 16:27

## 2024-11-27 RX ADMIN — PIPERACILLIN SODIUM AND TAZOBACTAM SODIUM 3.38 GRAM(S): 4; .5 INJECTION, POWDER, LYOPHILIZED, FOR SOLUTION INTRAVENOUS at 21:04

## 2024-11-27 RX ADMIN — SODIUM CHLORIDE 1000 MILLILITER(S): 9 INJECTION, SOLUTION INTRAMUSCULAR; INTRAVENOUS; SUBCUTANEOUS at 16:26

## 2024-11-27 NOTE — ED ADULT NURSE NOTE - NSFALLUNIVINTERV_ED_ALL_ED
Bed/Stretcher in lowest position, wheels locked, appropriate side rails in place/Call bell, personal items and telephone in reach/Instruct patient to call for assistance before getting out of bed/chair/stretcher/Non-slip footwear applied when patient is off stretcher/[place] to call system/Physically safe environment - no spills, clutter or unnecessary equipment/Purposeful proactive rounding/Room/bathroom lighting operational, light cord in reach

## 2024-11-27 NOTE — ED ADULT NURSE NOTE - OBJECTIVE STATEMENT
Pt is a 55yo female who presents to the ED with complaints of RUQ pain. Pt states she has been experiencing this pain for 1 week and now is having NVD, Pt states the emesis and stool are both a yellow / bilious color. Upon assessment, pt is AOx4 and ambulatory, pt denies any fever, SOB, CP, or blurry vision. Pt last bowel mvmt was 1 hr ago.

## 2024-11-27 NOTE — ED PROVIDER NOTE - CARE PLAN
Principal Discharge DX:	BERTRAM (acute kidney injury)  Secondary Diagnosis:	Abdominal lymphadenopathy   1

## 2024-11-27 NOTE — ED PROVIDER NOTE - CLINICAL SUMMARY MEDICAL DECISION MAKING FREE TEXT BOX
54 year old female with pmhx of htn, dm presents with abdominal pain. Pt states she has had 7 days of RUQ abdominal pain and 6 days of n/v/d. States the abdominal pain radiates to her chest and right side of her back and is exacerbated with eating. Will obtain labs and RUQ ultrasound to eval for cholecystitis

## 2024-11-27 NOTE — ED PROVIDER NOTE - ATTENDING CONTRIBUTION TO CARE
54-year-old female; with PMH significant for HTN, HLD, DM; now presenting with abdominal pain–epigastric to right upper quadrant and right flank, x 1 week, associated nausea vomiting and diarrhea.  Patient reports emesis is NBNB.  Diarrhea is nonbloody.  Complaining of chills.  Denies any documented fever.  Patient does report that she takes ibuprofen every 6 hours for back pain.  Patient reports intermittently with p.o. intake patient's abdominal pain radiates to the chest.  Denies shortness of breath.  General:     NAD  Head:     NC/AT, EOMI, oral mucosa moist  Neck:     trachea midline  Lungs:     CTA b/l, no w/r/r  CVS:     S1S2, RRR, no m/g/r  Abd:     +BS, s/nd, TTP @ RUQ > epigastrium.   Ext:    2+ radial and pedal pulses, no c/c/e  Neuro: grossly intact  A/P: 54-year-old female; with PMH significant for HTN, HLD, DM; now presenting with abdominal pain–epigastric to right upper quadrant and right flank, x 1 week, associated nausea vomiting and diarrhea.   -labs, ivf, us, ct, abx, re-eval.

## 2024-11-27 NOTE — ED PROVIDER NOTE - CADM POA CENTRAL LINE
Initial / Assessment/Plan of Care Note     Baseline Assessment  84 year old admitted 8/16/2021 as Observation with a diagnosis of cervical myelopathy with cervical radiculopathy.   Prior to admission patient was living with Other (Comment) (SNF) and residing at [facility].  Patient does not  have a Power of  for Healthcare. Patient’s Primary Care Provider is Frankie Mackay DO.     Medical History  Past Medical History:   Diagnosis Date   • Alcohol abuse 1963   • Anxiety    • Benign essential hypertension    • CAD (coronary artery disease)    • Cardiac arrest (CMS/[facility]) 2011    cardiac arrest with cardiopulmonary resuscitation and defibrillation with atypical chest pain    • Cataracts, bilateral    • Chronic atrial fibrillation (CMS/[facility])    • CKD (chronic kidney disease)    • Depression    • Diabetes mellitus type II    • Diverticulosis of large intestine without diverticulitis    • Esophageal reflux    • GERD (gastroesophageal reflux disease)    • Hypokalemia    • Other and unspecified hyperlipidemia    • Ventricular tachycardia (CMS/[facility])    • Wears glasses    • Wears partial dentures     upper       Prior to Admission Status  Functional Status  Ambulation: Walker  Bathing: Facility Staff  Dressing: Independent/Self, Facility Staff  Toileting: Independent/Self  Meal Preparation: Facility Staff  Shopping: Homemaker  Medication Preparation: Pharmacy Setup  Medication Administration: Staff Administered  Housekeeping: Homemaker  Laundry: Homemaker  Transportation: Wheelchair Van    Agency/Support  Type of Services Prior to Hospitalization: PT, OT ([facility])  Support Systems: Friends/neighbors  Home Devices/Equipment: Blood glucose monitor, Blood pressure monitor  Mobility Assist Devices: Wheelchair, Standard walker  Sensory Support Devices: Hearing aids    Current Status  PT Ambulation Tips: Ambulation with therapy only  PT Transfer Tips: Use gait belt, Needs moderate assist, Other (comment) (Pivot  transfer with assist x 2)   Current Mental Status: Alert, Oriented to Person, Oriented to Place, Oriented to Reason for Hospitalization, Oriented to Time    Insurance  Primary: [employer] MEDICARE ADVANTAGE  Secondary: N/A    Barriers to Discharge  Identified Barriers to Discharge/Transition Planning: Consultation pending    Progress Note  ROJELIO completing assessment for discharge planning after being told in OFTs that pt is from a SNF where he was receiving rehab services. Chart reviewed. Pt has been at [facility] since 6/24. He has been paying privately to stay at facility for the past several weeks.     SW connecting with Elzbieta, admissions at [facility]. She confirms that pt was residing at facility. She will be calling pt to discuss if he would like a bed hold. SW will keep Verenice updated.     Plan at this time is for surgical intervention per neurosurgery. Awaiting medical clearance.     SW will assess for discharge needs post procedure- anticipate discharge back to [facility] for ongoing rehab when pt is medically stable.     Plan  SW/CM - Recommendations for Discharge: Sub-acute nursing home   PT - Recommendations for Discharge: Sub-acute nursing home, Post acute therapy    OT - Recommendations for Discharge:      SLP - Recommendations for Discharge:     Anticipate patient will need post-hospital services. Necessary services are available.  Anticipate patient can return to the environment from which patient entered the hospital.   Anticipate patient cannot provide self-care at discharge.    Refer to SW/CM Flowsheet for Goals and objective data.      No

## 2024-11-27 NOTE — ED ADULT NURSE REASSESSMENT NOTE - NS ED NURSE REASSESS COMMENT FT1
Assumed care of pt from EG RN at 1930. Pt complaining of 9/10 abdominal pain, pt medicated per orders. Pt is A&Ox4. Respirations are even and unlabored. Pt awaiting ultrasound results, CT scan and urine results. Plan on going.

## 2024-11-27 NOTE — ED PROVIDER NOTE - BIRTH SEX
Final Anesthesia Post-op Assessment    Patient: Isabel Ruggiero  Procedure(s) Performed: APPENDECTOMY, LAPAROSCOPIC  Anesthesia type: General    Vitals Value Taken Time   Temp 36.5 °C (97.7 °F) 10/1/2019  3:16 PM   Pulse 68 10/1/2019  3:30 PM   Resp 16 10/1/2019  3:30 PM   SpO2 98 % 10/1/2019  3:30 PM   /57 10/1/2019  3:30 PM   Vitals shown include unvalidated device data.    Last 24 I/O:     Intake/Output Summary (Last 24 hours) at 10/1/2019 1532  Last data filed at 10/1/2019 1518  Gross per 24 hour   Intake 800 ml   Output --   Net 800 ml         Patient Location: Phase II  Post-op Vital Signs:stable  Level of Consciousness: participates in exam, alert and awake  Respiratory Status: spontaneous ventilation  Cardiovascular blood pressure returned to baseline  Hydration: euvolemic  Pain Management: adequately controlled  Handoff: Handoff to receiving nurse was performed and questions were answered  Nausea: None  Airway Patency:patent  Post-op Assessment: awake, alert, appropriately conversant, or baseline, no complications, patient tolerated procedure well with no complications and evidence of recall       Female

## 2024-11-27 NOTE — ED PROVIDER NOTE - PHYSICAL EXAMINATION
General: Awake, alert, lying in bed in NAD  HEENT: Normocephalic, atraumatic. No scleral icterus or conjunctival injection. EOMI. Moist mucous membranes. Oropharynx clear.   Neck:. Soft and supple.  Cardiac: tachycardic to 110s, Peripheral pulses 2+ and symmetric. No LE edema.  Resp: Lungs CTAB. No accessory muscle use  Abd: Tender in RUQ and epigastric region, soft, non-distended. No guarding, rebound, or rigidity.  Back: Spine midline and non-tender. No CVA tenderness   Skin: No rashes, abrasions, or lacerations.  Neuro: AO x 4. Moves all extremities symmetrically. Motor strength and sensation grossly intact.  Psych: Appropriate mood and affect

## 2024-11-27 NOTE — ED PROVIDER NOTE - OBJECTIVE STATEMENT
54 year old female with pmhx of htn, dm presents with abdominal pain. Pt states she has had 7 days of RUQ abdominal pain and 6 days of n/v/d. States the abdominal pain radiates to her chest and right side of her back and is exacerbated with eating. Pt has also had cough/laryngitis for about 3 weeks. States she went to urgent care 2 weeks and was told she does not have an infection but cough has not gotten any better. Denies fever, sob, bloody stool, weakness, dysuria, lightheadedness, or any other complaints at this time.

## 2024-11-27 NOTE — ED PROVIDER NOTE - PROGRESS NOTE DETAILS
Pt with elevated WBC and new elder (baseline Cr 1.22, now 3.24) - will add ct to eval for kidney stone. Given elevated WBC and tachycardia, sepsis workup initiated Margarito PLUMMER: Pt received in sign out. Noted to have BERTRAM with creatinine approximately 3 times baseline.  Ultrasound of gallbladder showing fatty infiltration of liver but otherwise negative.  CT renal Gomez does not show any kidney stone or hydronephrosis.  Multiple lymph nodes noted throughout the abdomen of unclear origin.  UA negative for UTI but does have casts and elevated protein.  Patient takes ibuprofen 800 twice daily in addition to metformin, lisinopril, hydrochlorothiazide.  Will hold nephrotoxic medications at this time.  Has received 3 L IVF in ED in addition to pain medication.  Patient stable at this time.  Will admit to medicine for BERTRAM with nephrology consult placed.  Medicine team to follow up repeat BMP and with nephrology.

## 2024-11-28 DIAGNOSIS — N17.9 ACUTE KIDNEY FAILURE, UNSPECIFIED: ICD-10-CM

## 2024-11-28 LAB
A1C WITH ESTIMATED AVERAGE GLUCOSE RESULT: 8.5 % — HIGH (ref 4–5.6)
ANION GAP SERPL CALC-SCNC: 17 MMOL/L — SIGNIFICANT CHANGE UP (ref 5–17)
BUN SERPL-MCNC: 73.6 MG/DL — HIGH (ref 8–20)
CALCIUM SERPL-MCNC: 8.7 MG/DL — SIGNIFICANT CHANGE UP (ref 8.4–10.5)
CHLORIDE SERPL-SCNC: 97 MMOL/L — SIGNIFICANT CHANGE UP (ref 96–108)
CO2 SERPL-SCNC: 20 MMOL/L — LOW (ref 22–29)
CREAT SERPL-MCNC: 2.51 MG/DL — HIGH (ref 0.5–1.3)
EGFR: 22 ML/MIN/1.73M2 — LOW
ESTIMATED AVERAGE GLUCOSE: 197 MG/DL — HIGH (ref 68–114)
GLUCOSE BLDC GLUCOMTR-MCNC: 181 MG/DL — HIGH (ref 70–99)
GLUCOSE BLDC GLUCOMTR-MCNC: 189 MG/DL — HIGH (ref 70–99)
GLUCOSE BLDC GLUCOMTR-MCNC: 214 MG/DL — HIGH (ref 70–99)
GLUCOSE BLDC GLUCOMTR-MCNC: 235 MG/DL — HIGH (ref 70–99)
GLUCOSE SERPL-MCNC: 236 MG/DL — HIGH (ref 70–99)
POTASSIUM SERPL-MCNC: 3.7 MMOL/L — SIGNIFICANT CHANGE UP (ref 3.5–5.3)
POTASSIUM SERPL-SCNC: 3.7 MMOL/L — SIGNIFICANT CHANGE UP (ref 3.5–5.3)
SODIUM SERPL-SCNC: 134 MMOL/L — LOW (ref 135–145)

## 2024-11-28 PROCEDURE — 99223 1ST HOSP IP/OBS HIGH 75: CPT

## 2024-11-28 PROCEDURE — 93010 ELECTROCARDIOGRAM REPORT: CPT

## 2024-11-28 RX ORDER — SERTRALINE HYDROCHLORIDE 100 MG/1
1 TABLET, FILM COATED ORAL
Refills: 0 | DISCHARGE

## 2024-11-28 RX ORDER — ONDANSETRON HYDROCHLORIDE 4 MG/1
4 TABLET, FILM COATED ORAL EVERY 8 HOURS
Refills: 0 | Status: DISCONTINUED | OUTPATIENT
Start: 2024-11-28 | End: 2024-12-03

## 2024-11-28 RX ORDER — ALPRAZOLAM 0.5 MG
1 TABLET ORAL
Refills: 0 | DISCHARGE

## 2024-11-28 RX ORDER — GLUCAGON INJECTION, SOLUTION 0.5 MG/.1ML
1 INJECTION, SOLUTION SUBCUTANEOUS ONCE
Refills: 0 | Status: DISCONTINUED | OUTPATIENT
Start: 2024-11-28 | End: 2024-12-03

## 2024-11-28 RX ORDER — PANTOPRAZOLE SODIUM 40 MG/1
40 TABLET, DELAYED RELEASE ORAL EVERY 12 HOURS
Refills: 0 | Status: DISCONTINUED | OUTPATIENT
Start: 2024-11-28 | End: 2024-12-03

## 2024-11-28 RX ORDER — ALBUTEROL 90 MCG
2 AEROSOL (GRAM) INHALATION
Refills: 0 | DISCHARGE

## 2024-11-28 RX ORDER — HEPARIN SODIUM,PORCINE 1000/ML
5000 VIAL (ML) INJECTION EVERY 8 HOURS
Refills: 0 | Status: DISCONTINUED | OUTPATIENT
Start: 2024-11-28 | End: 2024-12-03

## 2024-11-28 RX ORDER — INFLUENZA VIRUS VACCINE 15; 15; 15; 15 UG/.5ML; UG/.5ML; UG/.5ML; UG/.5ML
0.5 SUSPENSION INTRAMUSCULAR ONCE
Refills: 0 | Status: DISCONTINUED | OUTPATIENT
Start: 2024-11-28 | End: 2024-12-03

## 2024-11-28 RX ORDER — MAGNESIUM, ALUMINUM HYDROXIDE 200-225/5
30 SUSPENSION, ORAL (FINAL DOSE FORM) ORAL EVERY 4 HOURS
Refills: 0 | Status: DISCONTINUED | OUTPATIENT
Start: 2024-11-28 | End: 2024-12-03

## 2024-11-28 RX ORDER — ALBUTEROL 90 MCG
2 AEROSOL (GRAM) INHALATION EVERY 6 HOURS
Refills: 0 | Status: DISCONTINUED | OUTPATIENT
Start: 2024-11-28 | End: 2024-12-03

## 2024-11-28 RX ORDER — OXYCODONE HYDROCHLORIDE 30 MG/1
5 TABLET ORAL EVERY 4 HOURS
Refills: 0 | Status: DISCONTINUED | OUTPATIENT
Start: 2024-11-28 | End: 2024-12-03

## 2024-11-28 RX ORDER — SERTRALINE HYDROCHLORIDE 100 MG/1
50 TABLET, FILM COATED ORAL DAILY
Refills: 0 | Status: DISCONTINUED | OUTPATIENT
Start: 2024-11-28 | End: 2024-12-03

## 2024-11-28 RX ORDER — ACETAMINOPHEN 500MG 500 MG/1
1000 TABLET, COATED ORAL ONCE
Refills: 0 | Status: COMPLETED | OUTPATIENT
Start: 2024-11-28 | End: 2024-11-28

## 2024-11-28 RX ORDER — ACETAMINOPHEN 500MG 500 MG/1
650 TABLET, COATED ORAL EVERY 6 HOURS
Refills: 0 | Status: DISCONTINUED | OUTPATIENT
Start: 2024-11-28 | End: 2024-12-03

## 2024-11-28 RX ORDER — 0.9 % SODIUM CHLORIDE 0.9 %
1000 INTRAVENOUS SOLUTION INTRAVENOUS
Refills: 0 | Status: DISCONTINUED | OUTPATIENT
Start: 2024-11-28 | End: 2024-12-03

## 2024-11-28 RX ORDER — SODIUM CHLORIDE 9 MG/ML
1000 INJECTION, SOLUTION INTRAMUSCULAR; INTRAVENOUS; SUBCUTANEOUS ONCE
Refills: 0 | Status: COMPLETED | OUTPATIENT
Start: 2024-11-28 | End: 2024-11-28

## 2024-11-28 RX ORDER — OXYCODONE HYDROCHLORIDE 30 MG/1
10 TABLET ORAL EVERY 6 HOURS
Refills: 0 | Status: DISCONTINUED | OUTPATIENT
Start: 2024-11-28 | End: 2024-12-03

## 2024-11-28 RX ORDER — ACETAMINOPHEN, DIPHENHYDRAMINE HCL, PHENYLEPHRINE HCL 325; 25; 5 MG/1; MG/1; MG/1
3 TABLET ORAL AT BEDTIME
Refills: 0 | Status: DISCONTINUED | OUTPATIENT
Start: 2024-11-28 | End: 2024-12-03

## 2024-11-28 RX ORDER — ALPRAZOLAM 0.5 MG
0.5 TABLET ORAL AT BEDTIME
Refills: 0 | Status: DISCONTINUED | OUTPATIENT
Start: 2024-11-28 | End: 2024-12-03

## 2024-11-28 RX ADMIN — ACETAMINOPHEN 500MG 400 MILLIGRAM(S): 500 TABLET, COATED ORAL at 02:00

## 2024-11-28 RX ADMIN — OXYCODONE HYDROCHLORIDE 5 MILLIGRAM(S): 30 TABLET ORAL at 19:49

## 2024-11-28 RX ADMIN — PANTOPRAZOLE SODIUM 40 MILLIGRAM(S): 40 TABLET, DELAYED RELEASE ORAL at 05:34

## 2024-11-28 RX ADMIN — Medication 5000 UNIT(S): at 13:52

## 2024-11-28 RX ADMIN — PANTOPRAZOLE SODIUM 40 MILLIGRAM(S): 40 TABLET, DELAYED RELEASE ORAL at 17:45

## 2024-11-28 RX ADMIN — OXYCODONE HYDROCHLORIDE 10 MILLIGRAM(S): 30 TABLET ORAL at 12:16

## 2024-11-28 RX ADMIN — Medication 0.5 MILLIGRAM(S): at 21:48

## 2024-11-28 RX ADMIN — Medication 5000 UNIT(S): at 21:48

## 2024-11-28 RX ADMIN — ACETAMINOPHEN 500MG 650 MILLIGRAM(S): 500 TABLET, COATED ORAL at 05:32

## 2024-11-28 RX ADMIN — OXYCODONE HYDROCHLORIDE 10 MILLIGRAM(S): 30 TABLET ORAL at 13:16

## 2024-11-28 RX ADMIN — Medication 100 MILLILITER(S): at 12:16

## 2024-11-28 RX ADMIN — Medication 1: at 09:17

## 2024-11-28 RX ADMIN — SERTRALINE HYDROCHLORIDE 50 MILLIGRAM(S): 100 TABLET, FILM COATED ORAL at 12:17

## 2024-11-28 RX ADMIN — Medication 5000 UNIT(S): at 05:34

## 2024-11-28 RX ADMIN — Medication 2: at 17:44

## 2024-11-28 RX ADMIN — Medication 1: at 12:16

## 2024-11-28 RX ADMIN — SODIUM CHLORIDE 1000 MILLILITER(S): 9 INJECTION, SOLUTION INTRAMUSCULAR; INTRAVENOUS; SUBCUTANEOUS at 02:00

## 2024-11-28 NOTE — H&P ADULT - NSHPPHYSICALEXAM_GEN_ALL_CORE
Vital Signs Last 24 Hrs  T(C): 36.6 (28 Nov 2024 03:35), Max: 37.1 (27 Nov 2024 19:25)  T(F): 97.9 (28 Nov 2024 03:35), Max: 98.8 (27 Nov 2024 19:25)  HR: 96 (28 Nov 2024 03:35) (86 - 113)  BP: 112/71 (28 Nov 2024 03:35) (89/55 - 112/71)  BP(mean): 66 (28 Nov 2024 00:01) (66 - 66)  RR: 19 (28 Nov 2024 03:35) (17 - 19)  SpO2: 94% (28 Nov 2024 03:35) (93% - 96%)    Parameters below as of 28 Nov 2024 03:35  Patient On (Oxygen Delivery Method): room air

## 2024-11-28 NOTE — H&P ADULT - NSHPSOCIALHISTORY_GEN_ALL_CORE
Smoked 1 PPD for about 40 years, quit in March, 2024 as per patient. No alcohol or illicit drug use. , lives with family.

## 2024-11-28 NOTE — ED ADULT NURSE REASSESSMENT NOTE - NS ED NURSE REASSESS COMMENT FT1
Pt blood pressure 89/55, MD Pimentel made aware. Per MD hold morphine, administer another liter of fluids and administer IV Tylenol for pain. Pt is A&Ox4. Respirations even and unlabored. Pt denies dizziness and headache. Plan on going

## 2024-11-28 NOTE — CONSULT NOTE ADULT - SUBJECTIVE AND OBJECTIVE BOX
[facility] DIVISION OF KIDNEY DISEASES AND HYPERTENSION -- INITIAL CONSULT NOTE  --------------------------------------------------------------------------------  HPI:  53 y/o female with PMH of HTN, DM-2, chronic back pain, obesity, mood disorder came to ED complaining of epigastric abdominal pain for one week. She was having poor po intake, and has been taking ibuprofen 800 mg bid for many years. Upon presentation patient was found to be hypotensive with SBP<89. CT abdomen/pelvis showed intraabdominal and RP LAD. Her labs were also significant for BERTRAM with sCr 3.24, her sCr used to be 0.67 from 2017. She recieved 3L of NS, and her RAASi and diuretic was held. Her sCr is improviing today. No signs of renal stones or obstruction on CT abdomen/pelvis noted.   Nephrology was consulted for BERTRAM.     PAST HISTORY  --------------------------------------------------------------------------------  PAST MEDICAL & SURGICAL HISTORY:  HTN (hypertension)      Diabetes      S/P hernia repair      S/P dilation and curettage      S/P appendectomy        FAMILY HISTORY:  FH: HTN (hypertension) (Father, Mother)    FHx: hyperlipidemia (Father, Mother)      PAST SOCIAL HISTORY:    ALLERGIES & MEDICATIONS  --------------------------------------------------------------------------------  Allergies    azithromycin (Unknown)    Intolerances      Standing Inpatient Medications  ALPRAZolam 0.5 milliGRAM(s) Oral at bedtime  dextrose 5%. 1000 milliLiter(s) IV Continuous <Continuous>  dextrose 5%. 1000 milliLiter(s) IV Continuous <Continuous>  dextrose 50% Injectable 25 Gram(s) IV Push once  dextrose 50% Injectable 12.5 Gram(s) IV Push once  dextrose 50% Injectable 25 Gram(s) IV Push once  glucagon  Injectable 1 milliGRAM(s) IntraMuscular once  heparin   Injectable 5000 Unit(s) SubCutaneous every 8 hours  influenza   Vaccine 0.5 milliLiter(s) IntraMuscular once  insulin lispro (ADMELOG) corrective regimen sliding scale   SubCutaneous three times a day before meals  insulin lispro (ADMELOG) corrective regimen sliding scale   SubCutaneous at bedtime  lactated ringers. 1000 milliLiter(s) IV Continuous <Continuous>  pantoprazole  Injectable 40 milliGRAM(s) IV Push every 12 hours  sertraline 50 milliGRAM(s) Oral daily    PRN Inpatient Medications  acetaminophen     Tablet .. 650 milliGRAM(s) Oral every 6 hours PRN  albuterol    90 MICROgram(s) HFA Inhaler 2 Puff(s) Inhalation every 6 hours PRN  aluminum hydroxide/magnesium hydroxide/simethicone Suspension 30 milliLiter(s) Oral every 4 hours PRN  dextrose Oral Gel 15 Gram(s) Oral once PRN  melatonin 3 milliGRAM(s) Oral at bedtime PRN  ondansetron Injectable 4 milliGRAM(s) IV Push every 8 hours PRN  oxyCODONE    IR 10 milliGRAM(s) Oral every 6 hours PRN  oxyCODONE    IR 5 milliGRAM(s) Oral every 4 hours PRN      REVIEW OF SYSTEMS  --------------------------------------------------------------------------------  Gen: No weight changes, fatigue, fevers/chills, weakness  Skin: No rashes  Head/Eyes/Ears/Mouth: No headache; Normal hearing; Normal vision w/o blurriness; No sinus pain/discomfort, sore throat  Respiratory: No dyspnea, cough, wheezing, hemoptysis  CV: No chest pain, PND, orthopnea  GI: No abdominal pain, diarrhea, constipation, nausea, vomiting, melena, hematochezia  : No increased frequency, dysuria, hematuria, nocturia  MSK: No joint pain/swelling; no back pain; no edema  Neuro: No dizziness/lightheadedness, weakness, seizures, numbness, tingling  Heme: No easy bruising or bleeding      All other systems were reviewed and are negative, except as noted.    VITALS/PHYSICAL EXAM  --------------------------------------------------------------------------------  T(C): 36.8 (11-28-24 @ 11:44), Max: 37.1 (11-27-24 @ 19:25)  HR: 91 (11-28-24 @ 11:44) (86 - 113)  BP: 103/64 (11-28-24 @ 11:44) (89/55 - 112/71)  RR: 18 (11-28-24 @ 11:44) (17 - 19)  SpO2: 95% (11-28-24 @ 11:44) (92% - 96%)  Wt(kg): --    Weight (kg): 141.5 (11-27-24 @ 14:37)      Physical Exam:  Gen: NAD, well-appearing  HEENT: PERRL, supple neck, clear oropharynx  Pulm: CTA B/L  CV: RRR, S1S2; no peripheral edema  Abd: +BS, soft, nontender/nondistended  Neuro: A and Ox3  MSK no deformities    LABS/STUDIES  --------------------------------------------------------------------------------              12.5   18.47 >-----------<  345      [11-27-24 @ 16:06]              39.8     134  |  97  |  73.6  ----------------------------<  236      [11-28-24 @ 03:27]  3.7   |  20.0  |  2.51        Ca     8.7     [11-28-24 @ 03:27]    TPro  8.0  /  Alb  4.2  /  TBili  0.2  /  DBili  x   /  AST  19  /  ALT  24  /  AlkPhos  84  [11-27-24 @ 16:06]          Creatinine Trend:  SCr 2.51 [11-28 @ 03:27]  SCr 3.24 [11-27 @ 16:06]    Urinalysis - [11-28-24 @ 03:27]      Color  / Appearance  / SG  / pH       Gluc 236 / Ketone   / Bili  / Urobili        Blood  / Protein  / Leuk Est  / Nitrite       RBC  / WBC  / Hyaline  / Gran  / Sq Epi  / Non Sq Epi  / Bacteria

## 2024-11-28 NOTE — CONSULT NOTE ADULT - ASSESSMENT
53 y/o female with PMH of HTN, DM-2, chronic back pain, obesity, mood disorder came to ED complaining of epigastric abdominal pain for one week. She was having poor po intake, and has been taking ibuprofen 800 mg bid for many years. Upon presentation patient was found to be hypotensive with SBP<89. CT abdomen/pelvis showed intraabdominal and RP LAD. Her labs were also significant for BERTRAM with sCr 3.24, her sCr used to be 0.67 from 2017. She recieved 3L of NS, and her RAASi and diuretic was held. Her sCr is improviing today. No signs of renal stones or obstruction on CT abdomen/pelvis noted.   Nephrology was consulted for BERTRAM.     BERTRAM most likely pre-renal in the setting of poor po intake, and NSAID use, now improving with IVF   CT abdomen/pelvis without obstructive etiology   UA with trace protein   Abdominal pain could due to to gastroenteritis   LAD, need to rule out malignant etiology   DM2  HTN BP is currently low    Plan   Agree with holding lisinopril-HCTZ 20-25   Start LR @ 75 ml/hr   Avoid nephrotoxins including NSAIDs, fleet enemas, and iv contrast  Dose medications by eGFR<30  Monitor urine output   Daily labs  Rest of care as per primary team   Will follow   Discussed with primary team

## 2024-11-28 NOTE — H&P ADULT - ASSESSMENT
53 y/o female with PMH of HTN, DM-2, chronic back pain, obesity, mood disorder came to the ED complaining of abdominal pain x 1 week. Pain is located in the epigastric area, radiating to the chest and back associated with nausea, vomiting, exacerbated by food.    Enlarged and prominent upper abdominal and retroperitoneal lymph nodes as of uncertain etiology. The differential diagnosis includes reactive lymphadenopathy, metastatic disease, and lymphoproliferative disorder. Hepatomegaly and severe hepatic steatosis.      BERTRAM   Admit to medical floor  Cr. 3.24 (baseline 0.6-1.33)   Likely NSAID induced, patient also on Lisinopril-HCTZ 20-25mg (2 tabs) daily   3L of IVF given in the ED   Nephrology consulted   Monitor renal function   Avoid nephrotoxic agent   CT renal hunt as noted above     RUQ abdominal pain with nausea/vomiting   Likely due to gastritis (NSAID and stress induced)   As per diana VILLALOBOS, EGD done (10/17/24): mild antral gastritis, duodenitis.   Will give PPI 40mg bid   Zofran PRN   Monitor electrolytes  GI follow up     Hepatomegaly/severe hepatic steatosis   As noted on CT above   Etiology likely LLOYD   GI consulted     Abdominal/retroperitoneal lymph node   As noted on CT above   Likely reactive   Discussed with patient, patient to follow up with PCP for repeat CT abdominal and hematology referral as needed     Leukocytosis   WBC: 18.47 with left shift   No clear source of infection   Watch without antibiotic for now     HTN  Lisinopril-HCTZ 20-25mg (2 tabs) daily, will hold for now due to BERTRAM   Monitor BP     DM-2   Hold Metformin   Insulin sliding scale   HbA1C with AM lab     Mood disorder   Zoloft 50mg   Xanax 0.5mg HS     RLS?   Ropinirole (patient could not tell me the dose, no information on Dr. Zhao)   Please check with family/pharmacy in AM and continue     Chronic back pain   Oxycodone PRN on hold   Flexeril PRN     Supportive   DVT prophylaxis: Heparin sc  Diet: full liquid for now, advance as needed     Plan of care discussed with patient and ER nurse     Dispo: when stable, home.

## 2024-11-28 NOTE — H&P ADULT - HISTORY OF PRESENT ILLNESS
53 y/o female with PMH of HTN, DM-2, chronic back pain, obesity, mood disorder came to the ED complaining of abdominal pain x 1 week. Pain is located in the epigastric area, radiating to the chest and back associated with nausea, vomiting, exacerbated by food. Patient reported been under stress in the past 1 week  due to loss of her mother. Of note, she went to urgent care about 2 weeks ago for cough/laryngitis which she has had for 3 weeks was told she did not have an infection but as per patient, continue to have persistent dry cough. She has no fever, chills, chest pain, melena, hematochezia, hematemesis, recent travel, sick contact, HA.

## 2024-11-29 LAB
ALBUMIN SERPL ELPH-MCNC: 3.6 G/DL — SIGNIFICANT CHANGE UP (ref 3.3–5.2)
ALP SERPL-CCNC: 72 U/L — SIGNIFICANT CHANGE UP (ref 40–120)
ALT FLD-CCNC: 22 U/L — SIGNIFICANT CHANGE UP
ANION GAP SERPL CALC-SCNC: 15 MMOL/L — SIGNIFICANT CHANGE UP (ref 5–17)
AST SERPL-CCNC: 18 U/L — SIGNIFICANT CHANGE UP
BASOPHILS # BLD AUTO: 0.04 K/UL — SIGNIFICANT CHANGE UP (ref 0–0.2)
BASOPHILS NFR BLD AUTO: 0.3 % — SIGNIFICANT CHANGE UP (ref 0–2)
BILIRUB SERPL-MCNC: <0.2 MG/DL — LOW (ref 0.4–2)
BUN SERPL-MCNC: 55.5 MG/DL — HIGH (ref 8–20)
CALCIUM SERPL-MCNC: 8.9 MG/DL — SIGNIFICANT CHANGE UP (ref 8.4–10.5)
CHLORIDE SERPL-SCNC: 100 MMOL/L — SIGNIFICANT CHANGE UP (ref 96–108)
CO2 SERPL-SCNC: 21 MMOL/L — LOW (ref 22–29)
CREAT SERPL-MCNC: 1.72 MG/DL — HIGH (ref 0.5–1.3)
CULTURE RESULTS: SIGNIFICANT CHANGE UP
EGFR: 35 ML/MIN/1.73M2 — LOW
EOSINOPHIL # BLD AUTO: 0.1 K/UL — SIGNIFICANT CHANGE UP (ref 0–0.5)
EOSINOPHIL NFR BLD AUTO: 0.8 % — SIGNIFICANT CHANGE UP (ref 0–6)
GLUCOSE BLDC GLUCOMTR-MCNC: 179 MG/DL — HIGH (ref 70–99)
GLUCOSE BLDC GLUCOMTR-MCNC: 192 MG/DL — HIGH (ref 70–99)
GLUCOSE BLDC GLUCOMTR-MCNC: 211 MG/DL — HIGH (ref 70–99)
GLUCOSE BLDC GLUCOMTR-MCNC: 211 MG/DL — HIGH (ref 70–99)
GLUCOSE SERPL-MCNC: 205 MG/DL — HIGH (ref 70–99)
HCT VFR BLD CALC: 32.4 % — LOW (ref 34.5–45)
HCT VFR BLD CALC: 33.9 % — LOW (ref 34.5–45)
HGB BLD-MCNC: 10.2 G/DL — LOW (ref 11.5–15.5)
HGB BLD-MCNC: 10.7 G/DL — LOW (ref 11.5–15.5)
IMM GRANULOCYTES NFR BLD AUTO: 0.6 % — SIGNIFICANT CHANGE UP (ref 0–0.9)
LYMPHOCYTES # BLD AUTO: 1.5 K/UL — SIGNIFICANT CHANGE UP (ref 1–3.3)
LYMPHOCYTES # BLD AUTO: 12.4 % — LOW (ref 13–44)
MAGNESIUM SERPL-MCNC: 1.9 MG/DL — SIGNIFICANT CHANGE UP (ref 1.6–2.6)
MCHC RBC-ENTMCNC: 24.8 PG — LOW (ref 27–34)
MCHC RBC-ENTMCNC: 24.9 PG — LOW (ref 27–34)
MCHC RBC-ENTMCNC: 31.5 G/DL — LOW (ref 32–36)
MCHC RBC-ENTMCNC: 31.6 G/DL — LOW (ref 32–36)
MCV RBC AUTO: 78.7 FL — LOW (ref 80–100)
MCV RBC AUTO: 79.2 FL — LOW (ref 80–100)
MONOCYTES # BLD AUTO: 0.65 K/UL — SIGNIFICANT CHANGE UP (ref 0–0.9)
MONOCYTES NFR BLD AUTO: 5.4 % — SIGNIFICANT CHANGE UP (ref 2–14)
NEUTROPHILS # BLD AUTO: 9.73 K/UL — HIGH (ref 1.8–7.4)
NEUTROPHILS NFR BLD AUTO: 80.5 % — HIGH (ref 43–77)
PLATELET # BLD AUTO: 270 K/UL — SIGNIFICANT CHANGE UP (ref 150–400)
PLATELET # BLD AUTO: 273 K/UL — SIGNIFICANT CHANGE UP (ref 150–400)
POTASSIUM SERPL-MCNC: 3.8 MMOL/L — SIGNIFICANT CHANGE UP (ref 3.5–5.3)
POTASSIUM SERPL-SCNC: 3.8 MMOL/L — SIGNIFICANT CHANGE UP (ref 3.5–5.3)
PROT SERPL-MCNC: 7.1 G/DL — SIGNIFICANT CHANGE UP (ref 6.6–8.7)
RBC # BLD: 4.09 M/UL — SIGNIFICANT CHANGE UP (ref 3.8–5.2)
RBC # BLD: 4.31 M/UL — SIGNIFICANT CHANGE UP (ref 3.8–5.2)
RBC # FLD: 17.1 % — HIGH (ref 10.3–14.5)
RBC # FLD: 17.4 % — HIGH (ref 10.3–14.5)
SODIUM SERPL-SCNC: 136 MMOL/L — SIGNIFICANT CHANGE UP (ref 135–145)
SPECIMEN SOURCE: SIGNIFICANT CHANGE UP
WBC # BLD: 12.09 K/UL — HIGH (ref 3.8–10.5)
WBC # BLD: 12.46 K/UL — HIGH (ref 3.8–10.5)
WBC # FLD AUTO: 12.09 K/UL — HIGH (ref 3.8–10.5)
WBC # FLD AUTO: 12.46 K/UL — HIGH (ref 3.8–10.5)

## 2024-11-29 PROCEDURE — 99233 SBSQ HOSP IP/OBS HIGH 50: CPT

## 2024-11-29 RX ORDER — ALBUTEROL 90 MCG
2.5 AEROSOL (GRAM) INHALATION ONCE
Refills: 0 | Status: COMPLETED | OUTPATIENT
Start: 2024-11-29 | End: 2024-11-29

## 2024-11-29 RX ADMIN — Medication 2: at 17:32

## 2024-11-29 RX ADMIN — Medication 100 MILLILITER(S): at 03:53

## 2024-11-29 RX ADMIN — Medication 2 PUFF(S): at 11:50

## 2024-11-29 RX ADMIN — Medication 2: at 11:54

## 2024-11-29 RX ADMIN — PANTOPRAZOLE SODIUM 40 MILLIGRAM(S): 40 TABLET, DELAYED RELEASE ORAL at 05:09

## 2024-11-29 RX ADMIN — Medication 5000 UNIT(S): at 21:52

## 2024-11-29 RX ADMIN — Medication 2.5 MILLIGRAM(S): at 15:30

## 2024-11-29 RX ADMIN — Medication 1: at 08:16

## 2024-11-29 RX ADMIN — Medication 0.5 MILLIGRAM(S): at 21:51

## 2024-11-29 RX ADMIN — OXYCODONE HYDROCHLORIDE 5 MILLIGRAM(S): 30 TABLET ORAL at 17:37

## 2024-11-29 RX ADMIN — OXYCODONE HYDROCHLORIDE 5 MILLIGRAM(S): 30 TABLET ORAL at 00:06

## 2024-11-29 RX ADMIN — Medication 5000 UNIT(S): at 14:21

## 2024-11-29 RX ADMIN — PANTOPRAZOLE SODIUM 40 MILLIGRAM(S): 40 TABLET, DELAYED RELEASE ORAL at 17:36

## 2024-11-29 RX ADMIN — SERTRALINE HYDROCHLORIDE 50 MILLIGRAM(S): 100 TABLET, FILM COATED ORAL at 14:21

## 2024-11-29 RX ADMIN — ACETAMINOPHEN 500MG 650 MILLIGRAM(S): 500 TABLET, COATED ORAL at 08:18

## 2024-11-29 RX ADMIN — Medication 5000 UNIT(S): at 05:09

## 2024-11-30 LAB
ALBUMIN SERPL ELPH-MCNC: 3.6 G/DL — SIGNIFICANT CHANGE UP (ref 3.3–5.2)
ALP SERPL-CCNC: 70 U/L — SIGNIFICANT CHANGE UP (ref 40–120)
ALT FLD-CCNC: 24 U/L — SIGNIFICANT CHANGE UP
ANION GAP SERPL CALC-SCNC: 15 MMOL/L — SIGNIFICANT CHANGE UP (ref 5–17)
AST SERPL-CCNC: 20 U/L — SIGNIFICANT CHANGE UP
BILIRUB SERPL-MCNC: <0.2 MG/DL — LOW (ref 0.4–2)
BUN SERPL-MCNC: 39.3 MG/DL — HIGH (ref 8–20)
CALCIUM SERPL-MCNC: 8.7 MG/DL — SIGNIFICANT CHANGE UP (ref 8.4–10.5)
CHLORIDE SERPL-SCNC: 97 MMOL/L — SIGNIFICANT CHANGE UP (ref 96–108)
CO2 SERPL-SCNC: 24 MMOL/L — SIGNIFICANT CHANGE UP (ref 22–29)
CREAT SERPL-MCNC: 1.41 MG/DL — HIGH (ref 0.5–1.3)
EGFR: 44 ML/MIN/1.73M2 — LOW
GLUCOSE BLDC GLUCOMTR-MCNC: 210 MG/DL — HIGH (ref 70–99)
GLUCOSE BLDC GLUCOMTR-MCNC: 233 MG/DL — HIGH (ref 70–99)
GLUCOSE BLDC GLUCOMTR-MCNC: 257 MG/DL — HIGH (ref 70–99)
GLUCOSE BLDC GLUCOMTR-MCNC: 268 MG/DL — HIGH (ref 70–99)
GLUCOSE SERPL-MCNC: 260 MG/DL — HIGH (ref 70–99)
POTASSIUM SERPL-MCNC: 3.7 MMOL/L — SIGNIFICANT CHANGE UP (ref 3.5–5.3)
POTASSIUM SERPL-SCNC: 3.7 MMOL/L — SIGNIFICANT CHANGE UP (ref 3.5–5.3)
PROT SERPL-MCNC: 7 G/DL — SIGNIFICANT CHANGE UP (ref 6.6–8.7)
SODIUM SERPL-SCNC: 136 MMOL/L — SIGNIFICANT CHANGE UP (ref 135–145)

## 2024-11-30 PROCEDURE — 99232 SBSQ HOSP IP/OBS MODERATE 35: CPT

## 2024-11-30 PROCEDURE — 99233 SBSQ HOSP IP/OBS HIGH 50: CPT

## 2024-11-30 RX ADMIN — Medication 0.5 MILLIGRAM(S): at 22:03

## 2024-11-30 RX ADMIN — PANTOPRAZOLE SODIUM 40 MILLIGRAM(S): 40 TABLET, DELAYED RELEASE ORAL at 05:15

## 2024-11-30 RX ADMIN — Medication 1: at 22:05

## 2024-11-30 RX ADMIN — ACETAMINOPHEN 500MG 650 MILLIGRAM(S): 500 TABLET, COATED ORAL at 06:15

## 2024-11-30 RX ADMIN — SERTRALINE HYDROCHLORIDE 50 MILLIGRAM(S): 100 TABLET, FILM COATED ORAL at 14:10

## 2024-11-30 RX ADMIN — Medication 5000 UNIT(S): at 22:03

## 2024-11-30 RX ADMIN — OXYCODONE HYDROCHLORIDE 10 MILLIGRAM(S): 30 TABLET ORAL at 23:04

## 2024-11-30 RX ADMIN — Medication 2: at 17:19

## 2024-11-30 RX ADMIN — Medication 5000 UNIT(S): at 14:12

## 2024-11-30 RX ADMIN — PANTOPRAZOLE SODIUM 40 MILLIGRAM(S): 40 TABLET, DELAYED RELEASE ORAL at 17:18

## 2024-11-30 RX ADMIN — Medication 2: at 08:24

## 2024-11-30 RX ADMIN — ACETAMINOPHEN 500MG 650 MILLIGRAM(S): 500 TABLET, COATED ORAL at 05:15

## 2024-11-30 RX ADMIN — ACETAMINOPHEN 500MG 650 MILLIGRAM(S): 500 TABLET, COATED ORAL at 16:12

## 2024-11-30 RX ADMIN — OXYCODONE HYDROCHLORIDE 10 MILLIGRAM(S): 30 TABLET ORAL at 22:04

## 2024-11-30 RX ADMIN — Medication 5000 UNIT(S): at 05:16

## 2024-11-30 RX ADMIN — Medication 3: at 11:44

## 2024-12-01 LAB
ALBUMIN SERPL ELPH-MCNC: 3.5 G/DL — SIGNIFICANT CHANGE UP (ref 3.3–5.2)
ALP SERPL-CCNC: 68 U/L — SIGNIFICANT CHANGE UP (ref 40–120)
ALT FLD-CCNC: 22 U/L — SIGNIFICANT CHANGE UP
ANION GAP SERPL CALC-SCNC: 12 MMOL/L — SIGNIFICANT CHANGE UP (ref 5–17)
AST SERPL-CCNC: 17 U/L — SIGNIFICANT CHANGE UP
BILIRUB SERPL-MCNC: <0.2 MG/DL — LOW (ref 0.4–2)
BUN SERPL-MCNC: 37 MG/DL — HIGH (ref 8–20)
CALCIUM SERPL-MCNC: 9 MG/DL — SIGNIFICANT CHANGE UP (ref 8.4–10.5)
CHLORIDE SERPL-SCNC: 103 MMOL/L — SIGNIFICANT CHANGE UP (ref 96–108)
CO2 SERPL-SCNC: 26 MMOL/L — SIGNIFICANT CHANGE UP (ref 22–29)
CREAT SERPL-MCNC: 1.47 MG/DL — HIGH (ref 0.5–1.3)
EGFR: 42 ML/MIN/1.73M2 — LOW
GLUCOSE BLDC GLUCOMTR-MCNC: 161 MG/DL — HIGH (ref 70–99)
GLUCOSE BLDC GLUCOMTR-MCNC: 188 MG/DL — HIGH (ref 70–99)
GLUCOSE BLDC GLUCOMTR-MCNC: 206 MG/DL — HIGH (ref 70–99)
GLUCOSE BLDC GLUCOMTR-MCNC: 212 MG/DL — HIGH (ref 70–99)
GLUCOSE BLDC GLUCOMTR-MCNC: 248 MG/DL — HIGH (ref 70–99)
GLUCOSE SERPL-MCNC: 207 MG/DL — HIGH (ref 70–99)
HCT VFR BLD CALC: 33.5 % — LOW (ref 34.5–45)
HGB BLD-MCNC: 10.2 G/DL — LOW (ref 11.5–15.5)
MCHC RBC-ENTMCNC: 24.5 PG — LOW (ref 27–34)
MCHC RBC-ENTMCNC: 30.4 G/DL — LOW (ref 32–36)
MCV RBC AUTO: 80.3 FL — SIGNIFICANT CHANGE UP (ref 80–100)
PLATELET # BLD AUTO: 252 K/UL — SIGNIFICANT CHANGE UP (ref 150–400)
POTASSIUM SERPL-MCNC: 4.1 MMOL/L — SIGNIFICANT CHANGE UP (ref 3.5–5.3)
POTASSIUM SERPL-SCNC: 4.1 MMOL/L — SIGNIFICANT CHANGE UP (ref 3.5–5.3)
PROT SERPL-MCNC: 6.7 G/DL — SIGNIFICANT CHANGE UP (ref 6.6–8.7)
RBC # BLD: 4.17 M/UL — SIGNIFICANT CHANGE UP (ref 3.8–5.2)
RBC # FLD: 17.2 % — HIGH (ref 10.3–14.5)
SODIUM SERPL-SCNC: 140 MMOL/L — SIGNIFICANT CHANGE UP (ref 135–145)
WBC # BLD: 11.94 K/UL — HIGH (ref 3.8–10.5)
WBC # FLD AUTO: 11.94 K/UL — HIGH (ref 3.8–10.5)

## 2024-12-01 PROCEDURE — 99232 SBSQ HOSP IP/OBS MODERATE 35: CPT

## 2024-12-01 RX ORDER — IPRATROPIUM BROMIDE AND ALBUTEROL SULFATE 2.5; .5 MG/3ML; MG/3ML
3 SOLUTION RESPIRATORY (INHALATION) EVERY 6 HOURS
Refills: 0 | Status: DISCONTINUED | OUTPATIENT
Start: 2024-12-01 | End: 2024-12-03

## 2024-12-01 RX ORDER — LIDOCAINE 40 MG/G
1 CREAM TOPICAL DAILY
Refills: 0 | Status: DISCONTINUED | OUTPATIENT
Start: 2024-12-01 | End: 2024-12-03

## 2024-12-01 RX ORDER — INSULIN GLARGINE 100 [IU]/ML
5 INJECTION, SOLUTION SUBCUTANEOUS AT BEDTIME
Refills: 0 | Status: DISCONTINUED | OUTPATIENT
Start: 2024-12-01 | End: 2024-12-03

## 2024-12-01 RX ORDER — METHOCARBAMOL 500 MG/1
500 TABLET, FILM COATED ORAL EVERY 8 HOURS
Refills: 0 | Status: DISCONTINUED | OUTPATIENT
Start: 2024-12-01 | End: 2024-12-03

## 2024-12-01 RX ADMIN — ACETAMINOPHEN 500MG 650 MILLIGRAM(S): 500 TABLET, COATED ORAL at 17:47

## 2024-12-01 RX ADMIN — Medication 4: at 17:45

## 2024-12-01 RX ADMIN — INSULIN GLARGINE 5 UNIT(S): 100 INJECTION, SOLUTION SUBCUTANEOUS at 21:36

## 2024-12-01 RX ADMIN — Medication 1: at 09:29

## 2024-12-01 RX ADMIN — ACETAMINOPHEN 500MG 650 MILLIGRAM(S): 500 TABLET, COATED ORAL at 18:00

## 2024-12-01 RX ADMIN — OXYCODONE HYDROCHLORIDE 10 MILLIGRAM(S): 30 TABLET ORAL at 22:35

## 2024-12-01 RX ADMIN — Medication 0.5 MILLIGRAM(S): at 21:38

## 2024-12-01 RX ADMIN — PANTOPRAZOLE SODIUM 40 MILLIGRAM(S): 40 TABLET, DELAYED RELEASE ORAL at 17:45

## 2024-12-01 RX ADMIN — SERTRALINE HYDROCHLORIDE 50 MILLIGRAM(S): 100 TABLET, FILM COATED ORAL at 12:26

## 2024-12-01 RX ADMIN — Medication 5000 UNIT(S): at 21:38

## 2024-12-01 RX ADMIN — Medication 5000 UNIT(S): at 13:48

## 2024-12-01 RX ADMIN — PANTOPRAZOLE SODIUM 40 MILLIGRAM(S): 40 TABLET, DELAYED RELEASE ORAL at 05:16

## 2024-12-01 RX ADMIN — Medication 5000 UNIT(S): at 05:16

## 2024-12-01 RX ADMIN — OXYCODONE HYDROCHLORIDE 10 MILLIGRAM(S): 30 TABLET ORAL at 21:35

## 2024-12-01 RX ADMIN — Medication 4: at 12:25

## 2024-12-02 ENCOUNTER — TRANSCRIPTION ENCOUNTER (OUTPATIENT)
Age: 54
End: 2024-12-02

## 2024-12-02 LAB
ANION GAP SERPL CALC-SCNC: 12 MMOL/L — SIGNIFICANT CHANGE UP (ref 5–17)
BUN SERPL-MCNC: 27 MG/DL — HIGH (ref 8–20)
CALCIUM SERPL-MCNC: 8.1 MG/DL — LOW (ref 8.4–10.5)
CHLORIDE SERPL-SCNC: 103 MMOL/L — SIGNIFICANT CHANGE UP (ref 96–108)
CO2 SERPL-SCNC: 24 MMOL/L — SIGNIFICANT CHANGE UP (ref 22–29)
CREAT SERPL-MCNC: 1.17 MG/DL — SIGNIFICANT CHANGE UP (ref 0.5–1.3)
EGFR: 55 ML/MIN/1.73M2 — LOW
GLUCOSE BLDC GLUCOMTR-MCNC: 167 MG/DL — HIGH (ref 70–99)
GLUCOSE BLDC GLUCOMTR-MCNC: 179 MG/DL — HIGH (ref 70–99)
GLUCOSE BLDC GLUCOMTR-MCNC: 186 MG/DL — HIGH (ref 70–99)
GLUCOSE BLDC GLUCOMTR-MCNC: 188 MG/DL — HIGH (ref 70–99)
GLUCOSE SERPL-MCNC: 164 MG/DL — HIGH (ref 70–99)
HCT VFR BLD CALC: 33.9 % — LOW (ref 34.5–45)
HGB BLD-MCNC: 10.5 G/DL — LOW (ref 11.5–15.5)
MAGNESIUM SERPL-MCNC: 1.5 MG/DL — LOW (ref 1.6–2.6)
MCHC RBC-ENTMCNC: 24.9 PG — LOW (ref 27–34)
MCHC RBC-ENTMCNC: 31 G/DL — LOW (ref 32–36)
MCV RBC AUTO: 80.3 FL — SIGNIFICANT CHANGE UP (ref 80–100)
PHOSPHATE SERPL-MCNC: 3.3 MG/DL — SIGNIFICANT CHANGE UP (ref 2.4–4.7)
PLATELET # BLD AUTO: 251 K/UL — SIGNIFICANT CHANGE UP (ref 150–400)
POTASSIUM SERPL-MCNC: 3.9 MMOL/L — SIGNIFICANT CHANGE UP (ref 3.5–5.3)
POTASSIUM SERPL-SCNC: 3.9 MMOL/L — SIGNIFICANT CHANGE UP (ref 3.5–5.3)
RBC # BLD: 4.22 M/UL — SIGNIFICANT CHANGE UP (ref 3.8–5.2)
RBC # FLD: 17.3 % — HIGH (ref 10.3–14.5)
SODIUM SERPL-SCNC: 139 MMOL/L — SIGNIFICANT CHANGE UP (ref 135–145)
WBC # BLD: 11.7 K/UL — HIGH (ref 3.8–10.5)
WBC # FLD AUTO: 11.7 K/UL — HIGH (ref 3.8–10.5)

## 2024-12-02 PROCEDURE — 99232 SBSQ HOSP IP/OBS MODERATE 35: CPT

## 2024-12-02 RX ORDER — ISOPROPYL ALCOHOL, BENZOCAINE .7; .06 ML/ML; ML/ML
0 SWAB TOPICAL
Qty: 100 | Refills: 1
Start: 2024-12-02

## 2024-12-02 RX ORDER — INSULIN GLARGINE 100 [IU]/ML
5 INJECTION, SOLUTION SUBCUTANEOUS
Qty: 1 | Refills: 0
Start: 2024-12-02 | End: 2024-12-31

## 2024-12-02 RX ORDER — ACETAMINOPHEN, DIPHENHYDRAMINE HCL, PHENYLEPHRINE HCL 325; 25; 5 MG/1; MG/1; MG/1
1 TABLET ORAL
Qty: 0 | Refills: 0 | DISCHARGE
Start: 2024-12-02

## 2024-12-02 RX ORDER — OXYCODONE HYDROCHLORIDE 30 MG/1
1 TABLET ORAL
Qty: 10 | Refills: 0
Start: 2024-12-02 | End: 2024-12-06

## 2024-12-02 RX ORDER — ACETAMINOPHEN 500MG 500 MG/1
2 TABLET, COATED ORAL
Qty: 0 | Refills: 0 | DISCHARGE
Start: 2024-12-02

## 2024-12-02 RX ORDER — METHOCARBAMOL 500 MG/1
1 TABLET, FILM COATED ORAL
Qty: 20 | Refills: 0
Start: 2024-12-02 | End: 2024-12-11

## 2024-12-02 RX ORDER — ROPINIROLE 8 MG/1
0 TABLET, FILM COATED, EXTENDED RELEASE ORAL
Refills: 0 | DISCHARGE

## 2024-12-02 RX ORDER — PANTOPRAZOLE SODIUM 40 MG/1
1 TABLET, DELAYED RELEASE ORAL
Qty: 60 | Refills: 0
Start: 2024-12-02 | End: 2024-12-31

## 2024-12-02 RX ORDER — AMLODIPINE BESYLATE 10 MG/1
1 TABLET ORAL
Qty: 30 | Refills: 0
Start: 2024-12-02 | End: 2024-12-31

## 2024-12-02 RX ORDER — OXYCODONE HYDROCHLORIDE 30 MG/1
1 TABLET ORAL
Refills: 0 | DISCHARGE

## 2024-12-02 RX ORDER — LIDOCAINE 40 MG/G
1 CREAM TOPICAL
Qty: 10 | Refills: 0
Start: 2024-12-02 | End: 2024-12-11

## 2024-12-02 RX ORDER — LISINOPRIL AND HYDROCHLOROTHIAZIDE 10; 12.5 MG/1; MG/1
2 TABLET ORAL
Refills: 0 | DISCHARGE

## 2024-12-02 RX ORDER — LIDOCAINE HYDROCHLORIDE 20 MG/ML
1 JELLY TOPICAL
Qty: 10 | Refills: 0 | DISCHARGE
Start: 2024-12-02 | End: 2024-12-11

## 2024-12-02 RX ORDER — AMLODIPINE BESYLATE 10 MG/1
5 TABLET ORAL DAILY
Refills: 0 | Status: DISCONTINUED | OUTPATIENT
Start: 2024-12-02 | End: 2024-12-03

## 2024-12-02 RX ADMIN — PANTOPRAZOLE SODIUM 40 MILLIGRAM(S): 40 TABLET, DELAYED RELEASE ORAL at 18:30

## 2024-12-02 RX ADMIN — Medication 5000 UNIT(S): at 05:30

## 2024-12-02 RX ADMIN — INSULIN GLARGINE 5 UNIT(S): 100 INJECTION, SOLUTION SUBCUTANEOUS at 21:58

## 2024-12-02 RX ADMIN — SERTRALINE HYDROCHLORIDE 50 MILLIGRAM(S): 100 TABLET, FILM COATED ORAL at 09:29

## 2024-12-02 RX ADMIN — Medication 5000 UNIT(S): at 21:57

## 2024-12-02 RX ADMIN — Medication 2: at 08:31

## 2024-12-02 RX ADMIN — OXYCODONE HYDROCHLORIDE 10 MILLIGRAM(S): 30 TABLET ORAL at 23:00

## 2024-12-02 RX ADMIN — OXYCODONE HYDROCHLORIDE 10 MILLIGRAM(S): 30 TABLET ORAL at 22:00

## 2024-12-02 RX ADMIN — Medication 2: at 18:32

## 2024-12-02 RX ADMIN — AMLODIPINE BESYLATE 5 MILLIGRAM(S): 10 TABLET ORAL at 22:01

## 2024-12-02 RX ADMIN — Medication 0.5 MILLIGRAM(S): at 21:57

## 2024-12-02 RX ADMIN — Medication 5000 UNIT(S): at 14:24

## 2024-12-02 RX ADMIN — PANTOPRAZOLE SODIUM 40 MILLIGRAM(S): 40 TABLET, DELAYED RELEASE ORAL at 05:30

## 2024-12-02 RX ADMIN — Medication 2: at 13:00

## 2024-12-02 NOTE — DISCHARGE NOTE PROVIDER - NSDCCPCAREPLAN_GEN_ALL_CORE_FT
PRINCIPAL DISCHARGE DIAGNOSIS  Diagnosis: BERTRAM (acute kidney injury)  Assessment and Plan of Treatment:       SECONDARY DISCHARGE DIAGNOSES  Diagnosis: Abdominal lymphadenopathy  Assessment and Plan of Treatment:

## 2024-12-02 NOTE — DISCHARGE NOTE PROVIDER - ATTENDING DISCHARGE PHYSICAL EXAMINATION:
GENERAL: NAD,   HEAD:  Atraumatic, Normocephalic  EYES: EOMI, conjunctiva and sclera clear  ENMT: No tonsillar erythema, exudates, or enlargement; Moist mucous membranes,   NECK: Supple,   NERVOUS SYSTEM:  Alert & Oriented X3, Good concentration; Moves B/L upper and lower extremities;  CHEST/LUNG: Clear to percussion bilaterally  HEART: Regular rate and rhythm; No murmurs, rubs, or gallops  ABDOMEN: Soft, Nontender, Nondistended; Bowel sounds present  EXTREMITIES:  2+ Peripheral Pulses, No clubbing, cyanosis, or edema

## 2024-12-02 NOTE — DISCHARGE NOTE PROVIDER - HOSPITAL COURSE
54y/oF PMH HTN, DM-2, chronic back pain, obesity, mood disorder came to the ED complaining of abdominal pain x 1 week. Pain is located in the epigastric area, radiating to the chest and back associated with nausea, vomiting, exacerbated by food.    BERTRAM --Cr. 3.24 (baseline 0.6-1.33) - resolved - today Creatinine at baseline 1.1  -Likely NSAID induced, patient also on Lisinopril-HCTZ 20-25mg (2 tabs) daily   s/p IVF  -Nephrology recs appreciated   -Avoid nephrotoxic agent   -CT renal noted for Lymphadenopathy    RUQ abdominal pain with nausea/vomiting , improved   -Likely due to gastritis (NSAID and stress induced)   -As per diana VILLALOBOS, EGD done (10/17/24): mild antral gastritis, duodenitis.   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     Abdominal/retroperitoneal lymph node   -As noted on CT   -Likely reactive   -Discussed with patient, patient to follow up with PCP for repeat CT abdominal and hematology referral as needed     Leukocytosis , improving  -No clear source of infection   -cont to monitor off abx     HTN  -Lisinopril-HCTZ 20-25mg (2 tabs) daily, holding due to BERTRAM   -resume as needed for BP     DM-2   -Holding Metformin   -Hba1c 8.5%  -lantus, ISS, adjust regimen as needed, adjusted 12/1     Mood disorder   -Zoloft 50mg   -Xanax 0.5mg HS     RLS?   -Ropinirole     Chronic back pain   -cont pain regimen  - Robaxin   -cont lidocaine patch       vte prophylaxis: Heparin sq      Dispo: home stable,      54y/oF PMH HTN, DM-2, chronic back pain, obesity, mood disorder came to the ED complaining of abdominal pain x 1 week. Pain is located in the epigastric area, radiating to the chest and back associated with nausea, vomiting, exacerbated by food.    BERTRAM --Cr. 3.24 (baseline 0.6-1.33) - resolved - today Creatinine at baseline 1.1  -Likely NSAID induced, patient also on Lisinopril-HCTZ 20-25mg (2 tabs) daily   s/p IVF  -Nephrology discussed   -Avoid nephrotoxic agent   -CT renal noted for Lymphadenopathy    RUQ abdominal pain with nausea/vomiting , improved off metformin and off NSAIDS and on PPI  -Likely due to gastritis (NSAID and stress induced)   -As per diana VILLALOBOS, EGD done (10/17/24): mild antral gastritis, duodenitis.   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     Abdominal/retroperitoneal lymph node   -As noted on CT   -Likely reactive   -Discussed with patient, patient to follow up with PCP for repeat CT abdominal and hematology referral as needed     Leukocytosis , improving  -No clear source of infection   -cont to monitor off abx     HTN  -Lisinopril-HCTZ 20-25mg (2 tabs) daily, dc due to BERTRAM       DM-2   -Holding Metformin with high chance of gastroparesis   -Hba1c 8.5%  -Lantus 5 units QHS   out patient follow up with endocrine     Mood disorder   -Zoloft 50mg   -Xanax 0.5mg HS     RLS?   -Ropinirole     Chronic back pain   -cont pain regimen  - Robaxin   -cont lidocaine patch       Dispo: home stable,

## 2024-12-02 NOTE — DISCHARGE NOTE PROVIDER - DATE OF DISCHARGE SERVICE:
Spoke to Dr. Huey Schwartz 02-Dec-2024

## 2024-12-02 NOTE — DISCHARGE NOTE PROVIDER - CARE PROVIDERS DIRECT ADDRESSES
[email or web address]

## 2024-12-02 NOTE — DISCHARGE NOTE NURSING/CASE MANAGEMENT/SOCIAL WORK - NSDCVIVACCINE_GEN_ALL_CORE_FT
Tdap; 20-Apr-2021 15:59; Keke Finley (DIONICIO); [employer]; [id number] (Exp. Date: 18-Nov-2022); IntraMuscular; Deltoid Left.; 0.5 milliLiter(s); VIS (VIS Published: 09-May-2013, VIS Presented: 20-Apr-2021);

## 2024-12-02 NOTE — DISCHARGE NOTE PROVIDER - CARE PROVIDER_API CALL
Michael Christie  Nephrology  [address] [place] [id number]  Phone: [phone]  Fax: [phone]  Follow Up Time:

## 2024-12-02 NOTE — DISCHARGE NOTE PROVIDER - NSDCMRMEDTOKEN_GEN_ALL_CORE_FT
acetaminophen 325 mg oral tablet: 2 tab(s) orally every 6 hours As needed Temp greater or equal to 38C (100.4F), Mild Pain (1 - 3)  Albuterol (Eqv-Ventolin HFA) 90 mcg/inh inhalation aerosol: 2 inhaled every 6 hours as needed for  shortness of breath and/or wheezing  lidocaine 4% topical film: Apply topically to affected area once a day MDD: 1  melatonin 3 mg oral tablet: 1 tab(s) orally once a day (at bedtime) As needed Insomnia  methocarbamol 500 mg oral tablet: 1 tab(s) orally 2 times a day as needed for Muscle Spasm MDD: 2  oxyCODONE 10 mg oral tablet: 1 tab(s) orally 2 times a day as needed for  severe pain  Protonix 40 mg oral delayed release tablet: 1 tab(s) orally 2 times a day  Xanax 0.5 mg oral tablet: 1 tab(s) orally once a day (at bedtime)  Zoloft 50 mg oral tablet: 1 tab(s) orally once a day   acetaminophen 325 mg oral tablet: 2 tab(s) orally every 6 hours As needed Temp greater or equal to 38C (100.4F), Mild Pain (1 - 3)  Albuterol (Eqv-Ventolin HFA) 90 mcg/inh inhalation aerosol: 2 inhaled every 6 hours as needed for  shortness of breath and/or wheezing  alcohol swabs: Apply topically to affected area 4 times a day  amLODIPine 5 mg oral tablet: 1 tab(s) orally once a day  Freestyle Sasha 3 [place]: Dispense 1 [place]  Freestyle Precision Uriel Strips: Test blood sugar four times a day when you see check blood glucose symbol or when symptoms don&#x27;t match Sasha reading.  glucometer (per patient&#x27;s insurance): Test blood sugars four times a day. Dispense #1 glucometer.  insulin glargine 100 units/mL subcutaneous solution: 5 unit(s) subcutaneous once a day (at bedtime)  lidocaine 4% topical film: Apply topically to affected area once a day MDD: 1  melatonin 3 mg oral tablet: 1 tab(s) orally once a day (at bedtime) As needed Insomnia  methocarbamol 500 mg oral tablet: 1 tab(s) orally 2 times a day as needed for Muscle Spasm MDD: 2  oxyCODONE 10 mg oral tablet: 1 tab(s) orally 2 times a day as needed for  severe pain  Protonix 40 mg oral delayed release tablet: 1 tab(s) orally 2 times a day  Xanax 0.5 mg oral tablet: 1 tab(s) orally once a day (at bedtime)  Zoloft 50 mg oral tablet: 1 tab(s) orally once a day   acetaminophen 325 mg oral tablet: 2 tab(s) orally every 6 hours As needed Temp greater or equal to 38C (100.4F), Mild Pain (1 - 3)  Albuterol (Eqv-Ventolin HFA) 90 mcg/inh inhalation aerosol: 2 inhaled every 6 hours as needed for  shortness of breath and/or wheezing  alcohol swabs: Apply topically to affected area 4 times a day  amLODIPine 5 mg oral tablet: 1 tab(s) orally once a day  glucometer (per patient&#x27;s insurance): Test blood sugars four times a day. Dispense #1 glucometer.  insulin glargine 100 units/mL subcutaneous solution: 5 unit(s) subcutaneous once a day (at bedtime)  lidocaine 4% topical film: Apply topically to affected area once a day MDD: 1  melatonin 3 mg oral tablet: 1 tab(s) orally once a day (at bedtime) As needed Insomnia  methocarbamol 500 mg oral tablet: 1 tab(s) orally 2 times a day as needed for Muscle Spasm MDD: 2  oxyCODONE 10 mg oral tablet: 1 tab(s) orally 2 times a day as needed for  severe pain MDD: 2  Protonix 40 mg oral delayed release tablet: 1 tab(s) orally 2 times a day  Xanax 0.5 mg oral tablet: 1 tab(s) orally once a day (at bedtime)  Zoloft 50 mg oral tablet: 1 tab(s) orally once a day

## 2024-12-02 NOTE — DISCHARGE NOTE NURSING/CASE MANAGEMENT/SOCIAL WORK - FINANCIAL ASSISTANCE
[facility] provides services at a reduced cost to those who are determined to be eligible through [facility]’s financial assistance program. Information regarding [facility]’s financial assistance program can be found by going to [email or web address] or by calling [phone].

## 2024-12-02 NOTE — DISCHARGE NOTE NURSING/CASE MANAGEMENT/SOCIAL WORK - PATIENT PORTAL LINK FT
You can access the FollowMyHealth Patient Portal offered by [facility] by registering at the following website: [email or web address]. By joining [facility]’s FollowMyHealth portal, you will also be able to view your health information using other applications (apps) compatible with our system.

## 2024-12-03 VITALS
TEMPERATURE: 98 F | DIASTOLIC BLOOD PRESSURE: 80 MMHG | SYSTOLIC BLOOD PRESSURE: 140 MMHG | RESPIRATION RATE: 18 BRPM | OXYGEN SATURATION: 95 % | HEART RATE: 95 BPM

## 2024-12-03 LAB
CULTURE RESULTS: SIGNIFICANT CHANGE UP
GLUCOSE BLDC GLUCOMTR-MCNC: 177 MG/DL — HIGH (ref 70–99)
GLUCOSE BLDC GLUCOMTR-MCNC: 194 MG/DL — HIGH (ref 70–99)
SPECIMEN SOURCE: SIGNIFICANT CHANGE UP

## 2024-12-03 PROCEDURE — 76705 ECHO EXAM OF ABDOMEN: CPT

## 2024-12-03 PROCEDURE — 85025 COMPLETE CBC W/AUTO DIFF WBC: CPT

## 2024-12-03 PROCEDURE — 83690 ASSAY OF LIPASE: CPT

## 2024-12-03 PROCEDURE — 80053 COMPREHEN METABOLIC PANEL: CPT

## 2024-12-03 PROCEDURE — 96376 TX/PRO/DX INJ SAME DRUG ADON: CPT

## 2024-12-03 PROCEDURE — 96375 TX/PRO/DX INJ NEW DRUG ADDON: CPT

## 2024-12-03 PROCEDURE — 87086 URINE CULTURE/COLONY COUNT: CPT

## 2024-12-03 PROCEDURE — 80048 BASIC METABOLIC PNL TOTAL CA: CPT

## 2024-12-03 PROCEDURE — 87040 BLOOD CULTURE FOR BACTERIA: CPT

## 2024-12-03 PROCEDURE — 36415 COLL VENOUS BLD VENIPUNCTURE: CPT

## 2024-12-03 PROCEDURE — 99239 HOSP IP/OBS DSCHRG MGMT >30: CPT

## 2024-12-03 PROCEDURE — 83735 ASSAY OF MAGNESIUM: CPT

## 2024-12-03 PROCEDURE — 99285 EMERGENCY DEPT VISIT HI MDM: CPT | Mod: 25

## 2024-12-03 PROCEDURE — 82962 GLUCOSE BLOOD TEST: CPT

## 2024-12-03 PROCEDURE — 71045 X-RAY EXAM CHEST 1 VIEW: CPT

## 2024-12-03 PROCEDURE — 83036 HEMOGLOBIN GLYCOSYLATED A1C: CPT

## 2024-12-03 PROCEDURE — 94640 AIRWAY INHALATION TREATMENT: CPT

## 2024-12-03 PROCEDURE — 84100 ASSAY OF PHOSPHORUS: CPT

## 2024-12-03 PROCEDURE — 93005 ELECTROCARDIOGRAM TRACING: CPT

## 2024-12-03 PROCEDURE — 85027 COMPLETE CBC AUTOMATED: CPT

## 2024-12-03 PROCEDURE — 84484 ASSAY OF TROPONIN QUANT: CPT

## 2024-12-03 PROCEDURE — 96365 THER/PROPH/DIAG IV INF INIT: CPT

## 2024-12-03 PROCEDURE — 81001 URINALYSIS AUTO W/SCOPE: CPT

## 2024-12-03 PROCEDURE — 74176 CT ABD & PELVIS W/O CONTRAST: CPT | Mod: MC

## 2024-12-03 RX ORDER — INSULIN GLARGINE 100 [IU]/ML
10 INJECTION, SOLUTION SUBCUTANEOUS
Qty: 1 | Refills: 0 | DISCHARGE
Start: 2024-12-03 | End: 2025-01-01

## 2024-12-03 RX ORDER — INSULIN GLARGINE 100 [IU]/ML
7 INJECTION, SOLUTION SUBCUTANEOUS
Qty: 1 | Refills: 0
Start: 2024-12-03 | End: 2025-01-01

## 2024-12-03 RX ORDER — ISOPROPYL ALCOHOL, BENZOCAINE .7; .06 ML/ML; ML/ML
0 SWAB TOPICAL
Qty: 100 | Refills: 1
Start: 2024-12-03

## 2024-12-03 RX ORDER — NALOXONE HCL 0.4 MG/ML
1 AMPUL (ML) INJECTION
Qty: 1 | Refills: 0
Start: 2024-12-03

## 2024-12-03 RX ADMIN — Medication 5000 UNIT(S): at 14:17

## 2024-12-03 RX ADMIN — ACETAMINOPHEN 500MG 650 MILLIGRAM(S): 500 TABLET, COATED ORAL at 10:53

## 2024-12-03 RX ADMIN — AMLODIPINE BESYLATE 5 MILLIGRAM(S): 10 TABLET ORAL at 05:52

## 2024-12-03 RX ADMIN — ACETAMINOPHEN 500MG 650 MILLIGRAM(S): 500 TABLET, COATED ORAL at 09:22

## 2024-12-03 RX ADMIN — Medication 5000 UNIT(S): at 05:53

## 2024-12-03 RX ADMIN — SERTRALINE HYDROCHLORIDE 50 MILLIGRAM(S): 100 TABLET, FILM COATED ORAL at 11:43

## 2024-12-03 RX ADMIN — Medication 2: at 08:23

## 2024-12-03 RX ADMIN — PANTOPRAZOLE SODIUM 40 MILLIGRAM(S): 40 TABLET, DELAYED RELEASE ORAL at 05:53

## 2024-12-03 RX ADMIN — Medication 2: at 12:36

## 2024-12-03 NOTE — CHART NOTE - NSCHARTNOTEFT_GEN_A_CORE
Per conversation with attending, Dr. Crook, leslee med: lantus changed to 7 units. Call made to [facility] for delivery.
Nutrition Note: Pt is to be discharged later today requiring consistent carbohydrate diet education. Labs reviewed - HgbA1c 8.5%, POCT x24 hours 167-194. Pt provided with in depth education on consistent carbohydrate diet education. Discussed importance of adequate protein-energy intake, pairing CHO with protein/fat to improve BG control, MyPlate guidelines, and increasing fiber intake. Pt with very good understanding and questions addressed. RD contact information provided for further nutrition-related questions or concerns. Will continue to monitor and follow up as needed. RD remains available.
patient being seen for bertram. patient seen at bedside and complains of abd pain     a.p  53 y/o female with PMH of HTN, DM-2, chronic back pain, obesity, mood disorder came to the ED complaining of abdominal pain x 1 week. Pain is located in the epigastric area, radiating to the chest and back associated with nausea, vomiting, exacerbated by food.    BERTRAM   improved  spoke to nephro  - fluids  Likely NSAID induced, patient also on Lisinopril-HCTZ 20-25mg (2 tabs) daily     RUQ abdominal pain with nausea/vomiting   Likely due to gastritis (NSAID and stress induced)   Will give PPI 40mg bid   Zofran PRN     Hepatomegaly/severe hepatic steatosis   advised diet and exercise    Abdominal/retroperitoneal lymph node   As noted on CT above   Likely reactive   Discussed with patient, patient to follow up with PCP for repeat CT abdominal and hematology referral as needed       HTN  Lisinopril-HCTZ 20-25mg (2 tabs) daily, will hold for now due to BERTRAM   Monitor BP     DM-2   Hold Metformin   Insulin sliding scale   HbA1C appreciated    Mood disorder   Zoloft 50mg   Xanax 0.5mg HS     dispo - likely dc home in 1-2 days

## 2024-12-03 NOTE — PROGRESS NOTE ADULT - SUBJECTIVE AND OBJECTIVE BOX
[facility] DIVISION OF KIDNEY DISEASES AND HYPERTENSION    Chart reviewed, and case discussed with Dr Oneal  In brief, the patient is a 54 year old female with a history of hypertension, DMII, obesity, admitted complaining of epigastric pain, and back pain for approximately one week.  She has been self medicating with ibuprofen, and on admission was noted to have BERTRAM associated with hypotension.. Her peak SCr was 3.24.  The patient was treated with IV fluids Lisinopril-HCTZ, and ibuprofen discontinued with good results.    All other systems were reviewed and are negative, except as noted.    ICU Vital Signs Last 24 Hrs  T(C): 37.4 (29 Nov 2024 08:12), Max: 37.5 (29 Nov 2024 00:10)  T(F): 99.4 (29 Nov 2024 08:12), Max: 99.5 (29 Nov 2024 00:10)  HR: 103 (29 Nov 2024 08:12) (88 - 103)  BP: 116/75 (29 Nov 2024 08:12) (97/62 - 140/81)  BP(mean): --  ABP: --  ABP(mean): --  RR: 18 (29 Nov 2024 08:12) (18 - 18)  SpO2: 92% (29 Nov 2024 08:12) (91% - 95%)    O2 Parameters below as of 29 Nov 2024 08:12  Patient On (Oxygen Delivery Method): room air      I&O's Summary    28 Nov 2024 07:01  -  29 Nov 2024 07:00  --------------------------------------------------------  IN: 540 mL / OUT: 0 mL / NET: 540 mL    29 Nov 2024 07:01  -  29 Nov 2024 12:33  --------------------------------------------------------  IN: 225 mL / OUT: 200 mL / NET: 25 mL    On Exam:  awake and alert, no distress  (-) JVD  Lungs bilateral equal air entry, clear to auscultation  s1, S2 no audible rubs and gallops  Abd: soft, obese limiting underlying exam  Ext (-) edema, cyanosis, and clubing      11-29    136  |  100  |  55.5[H]  ----------------------------<  205[H]  3.8   |  21.0[L]  |  1.72[H]    Ca    8.9      29 Nov 2024 09:30  Mg     1.9     11-29    TPro  7.1  /  Alb  3.6  /  TBili  <0.2[L]  /  DBili  x   /  AST  18  /  ALT  22  /  AlkPhos  72  11-29                          10.2   12.46 )-----------( 270      ( 29 Nov 2024 11:20 )             32.4                 
[facility] DIVISION OF KIDNEY DISEASES AND HYPERTENSION      Progress reviewed, and patient seen and evaluated in her room.  The patient is awake and alert, in no distress, she denies chest pain, notes shortness of breath relieved by inhalers, denies nausea, vomiting and diarrhea.  She is more comfortable today    ICU Vital Signs Last 24 Hrs  T(C): 37.3 (01 Dec 2024 09:25), Max: 37.3 (01 Dec 2024 09:25)  T(F): 99.1 (01 Dec 2024 09:25), Max: 99.1 (01 Dec 2024 09:25)  HR: 97 (01 Dec 2024 09:25) (96 - 100)  BP: 135/82 (01 Dec 2024 05:14) (110/73 - 135/82)  BP(mean): 100 (01 Dec 2024 05:14) (94 - 100)  ABP: --  ABP(mean): --  RR: 20 (01 Dec 2024 09:25) (18 - 20)  SpO2: 91% (01 Dec 2024 09:25) (91% - 97%)    O2 Parameters below as of 01 Dec 2024 09:25  Patient On (Oxygen Delivery Method): room air      I&O's Summary    30 Nov 2024 07:01  -  01 Dec 2024 07:00  --------------------------------------------------------  IN: 930 mL / OUT: 0 mL / NET: 930 mL        On Exam:  awake and alert, no distress  (-) JVD  Lungs bilateral equal air entry, clear to auscultation  s1, S2 no audible rubs and gallops  Abd: soft, obese limiting underlying exam  Ext (-) edema, cyanosis, and clubbing    12-01    140  |  103  |  37.0[H]  ----------------------------<  207[H]  4.1   |  26.0  |  1.47[H]    Ca    9.0      01 Dec 2024 05:51    TPro  6.7  /  Alb  3.5  /  TBili  <0.2[L]  /  DBili  x   /  AST  17  /  ALT  22  /  AlkPhos  68  12-01                        10.2   11.94 )-----------( 252      ( 01 Dec 2024 05:51 )             33.5   MEDICATIONS  (STANDING):  ALPRAZolam 0.5 milliGRAM(s) Oral at bedtime  dextrose 5%. 1000 milliLiter(s) (50 mL/Hr) IV Continuous <Continuous>  dextrose 5%. 1000 milliLiter(s) (100 mL/Hr) IV Continuous <Continuous>  dextrose 50% Injectable 25 Gram(s) IV Push once  dextrose 50% Injectable 12.5 Gram(s) IV Push once  dextrose 50% Injectable 25 Gram(s) IV Push once  glucagon  Injectable 1 milliGRAM(s) IntraMuscular once  heparin   Injectable 5000 Unit(s) SubCutaneous every 8 hours  influenza   Vaccine 0.5 milliLiter(s) IntraMuscular once  insulin glargine Injectable (LANTUS) 5 Unit(s) SubCutaneous at bedtime  insulin lispro (ADMELOG) corrective regimen sliding scale   SubCutaneous three times a day before meals  insulin lispro (ADMELOG) corrective regimen sliding scale   SubCutaneous at bedtime  lactated ringers. 1000 milliLiter(s) (100 mL/Hr) IV Continuous <Continuous>  pantoprazole  Injectable 40 milliGRAM(s) IV Push every 12 hours  sertraline 50 milliGRAM(s) Oral daily    MEDICATIONS  (PRN):  acetaminophen     Tablet .. 650 milliGRAM(s) Oral every 6 hours PRN Temp greater or equal to 38C (100.4F), Mild Pain (1 - 3)  albuterol    90 MICROgram(s) HFA Inhaler 2 Puff(s) Inhalation every 6 hours PRN for shortness of breath and/or wheezing  aluminum hydroxide/magnesium hydroxide/simethicone Suspension 30 milliLiter(s) Oral every 4 hours PRN Dyspepsia  dextrose Oral Gel 15 Gram(s) Oral once PRN Blood Glucose LESS THAN 70 milliGRAM(s)/deciliter  melatonin 3 milliGRAM(s) Oral at bedtime PRN Insomnia  ondansetron Injectable 4 milliGRAM(s) IV Push every 8 hours PRN Nausea and/or Vomiting  oxyCODONE    IR 10 milliGRAM(s) Oral every 6 hours PRN Severe Pain (7 - 10)  oxyCODONE    IR 5 milliGRAM(s) Oral every 4 hours PRN Moderate Pain (4 - 6)              
[facility] DIVISION OF KIDNEY DISEASES AND HYPERTENSION      Progress reviewed, and patient seen and evaluated in her room.  The patient is awake and alert, in no distress, she denies chest pain, notes shortness of breath relieved by inhalers, deniesnausea, vomiting and diarrhea.  She notes back discomfor    ICU Vital Signs Last 24 Hrs  T(C): 36.7 (30 Nov 2024 08:27), Max: 37.2 (30 Nov 2024 05:12)  T(F): 98 (30 Nov 2024 08:27), Max: 98.9 (30 Nov 2024 05:12)  HR: 99 (30 Nov 2024 08:27) (88 - 100)  BP: 147/82 (30 Nov 2024 08:27) (105/67 - 147/82)  BP(mean): 103 (30 Nov 2024 05:12) (87 - 103)  ABP: --  ABP(mean): --  RR: 18 (30 Nov 2024 08:27) (18 - 20)  SpO2: 95% (30 Nov 2024 08:27) (94% - 98%)    O2 Parameters below as of 30 Nov 2024 08:27  Patient On (Oxygen Delivery Method): room air            I&O's Summary    29 Nov 2024 07:01  -  30 Nov 2024 07:00  --------------------------------------------------------  IN: 225 mL / OUT: 200 mL / NET: 25 mL    30 Nov 2024 07:01  -  30 Nov 2024 13:28  --------------------------------------------------------  IN: 450 mL / OUT: 0 mL / NET: 450 mL      On Exam:  awake and alert, no distress  (-) JVD  Lungs bilateral equal air entry, clear to auscultation  s1, S2 no audible rubs and gallops  Abd: soft, obese limiting underlying exam  Ext (-) edema, cyanosis, and clubbing    11-30    136  |  97  |  39.3[H]  ----------------------------<  260[H]  3.7   |  24.0  |  1.41[H]    Ca    8.7      30 Nov 2024 12:07  Mg     1.9     11-29    TPro  7.0  /  Alb  3.6  /  TBili  <0.2[L]  /  DBili  x   /  AST  20  /  ALT  24  /  AlkPhos  70  11-30                          10.2   12.46 )-----------( 270      ( 29 Nov 2024 11:20 )             32.4     MEDICATIONS  (STANDING):  ALPRAZolam 0.5 milliGRAM(s) Oral at bedtime  dextrose 5%. 1000 milliLiter(s) (50 mL/Hr) IV Continuous <Continuous>  dextrose 5%. 1000 milliLiter(s) (100 mL/Hr) IV Continuous <Continuous>  dextrose 50% Injectable 25 Gram(s) IV Push once  dextrose 50% Injectable 12.5 Gram(s) IV Push once  dextrose 50% Injectable 25 Gram(s) IV Push once  glucagon  Injectable 1 milliGRAM(s) IntraMuscular once  heparin   Injectable 5000 Unit(s) SubCutaneous every 8 hours  influenza   Vaccine 0.5 milliLiter(s) IntraMuscular once  insulin lispro (ADMELOG) corrective regimen sliding scale   SubCutaneous three times a day before meals  insulin lispro (ADMELOG) corrective regimen sliding scale   SubCutaneous at bedtime  lactated ringers. 1000 milliLiter(s) (100 mL/Hr) IV Continuous <Continuous>  pantoprazole  Injectable 40 milliGRAM(s) IV Push every 12 hours  sertraline 50 milliGRAM(s) Oral daily    MEDICATIONS  (PRN):  acetaminophen     Tablet .. 650 milliGRAM(s) Oral every 6 hours PRN Temp greater or equal to 38C (100.4F), Mild Pain (1 - 3)  albuterol    90 MICROgram(s) HFA Inhaler 2 Puff(s) Inhalation every 6 hours PRN for shortness of breath and/or wheezing  aluminum hydroxide/magnesium hydroxide/simethicone Suspension 30 milliLiter(s) Oral every 4 hours PRN Dyspepsia  dextrose Oral Gel 15 Gram(s) Oral once PRN Blood Glucose LESS THAN 70 milliGRAM(s)/deciliter  melatonin 3 milliGRAM(s) Oral at bedtime PRN Insomnia  ondansetron Injectable 4 milliGRAM(s) IV Push every 8 hours PRN Nausea and/or Vomiting  oxyCODONE    IR 10 milliGRAM(s) Oral every 6 hours PRN Severe Pain (7 - 10)  oxyCODONE    IR 5 milliGRAM(s) Oral every 4 hours PRN Moderate Pain (4 - 6)                
VONDA LAW Patient is a 54y old  Female who presents with a chief complaint of BERTRAM (29 Nov 2024 12:26)     HPI:  53 y/o female with PMH of HTN, DM-2, chronic back pain, obesity, mood disorder came to the ED complaining of abdominal pain x 1 week. Pain is located in the epigastric area, radiating to the chest and back associated with nausea, vomiting, exacerbated by food. Patient reported been under stress in the past 1 week  due to loss of her mother. Of note, she went to urgent care about 2 weeks ago for cough/laryngitis which she has had for 3 weeks was told she did not have an infection but as per patient, continue to have persistent dry cough. She has no fever, chills, chest pain, melena, hematochezia, hematemesis, recent travel, sick contact, HA.  (28 Nov 2024 04:24)    The patient was seen and evaluated - here for BERTRAM - await lab results from today- sittig edge of bed eating watching TV - no complaints  The patient is in no acute distress.  Denied any fever chest pain, palpitations, shortness of breath, abdominal pain, fever, dysuria, cough, edema       I&O's Summary    29 Nov 2024 07:01  -  30 Nov 2024 07:00  --------------------------------------------------------  IN: 225 mL / OUT: 200 mL / NET: 25 mL    30 Nov 2024 07:01  -  30 Nov 2024 12:47  --------------------------------------------------------  IN: 450 mL / OUT: 0 mL / NET: 450 mL      Allergies    azithromycin (Unknown)    Intolerances      HEALTH ISSUES - PROBLEM Dx:        PAST MEDICAL & SURGICAL HISTORY:  HTN (hypertension)      Diabetes      S/P hernia repair      S/P dilation and curettage      S/P appendectomy              Vital Signs Last 24 Hrs  T(C): 36.7 (30 Nov 2024 08:27), Max: 37.2 (30 Nov 2024 05:12)  T(F): 98 (30 Nov 2024 08:27), Max: 98.9 (30 Nov 2024 05:12)  HR: 99 (30 Nov 2024 08:27) (88 - 100)  BP: 147/82 (30 Nov 2024 08:27) (105/67 - 147/82)  BP(mean): 103 (30 Nov 2024 05:12) (87 - 103)  RR: 18 (30 Nov 2024 08:27) (18 - 20)  SpO2: 95% (30 Nov 2024 08:27) (94% - 98%)    Parameters below as of 30 Nov 2024 08:27  Patient On (Oxygen Delivery Method): room air    T(C): 36.7 (11-30-24 @ 08:27), Max: 37.2 (11-30-24 @ 05:12)  HR: 99 (11-30-24 @ 08:27) (88 - 100)  BP: 147/82 (11-30-24 @ 08:27) (105/67 - 147/82)  RR: 18 (11-30-24 @ 08:27) (18 - 20)  SpO2: 95% (11-30-24 @ 08:27) (94% - 98%)  Wt(kg): --    PHYSICAL EXAM:    GENERAL: NAD conversing pleasant recently lost her mom   HEAD:  Atraumatic, Normocephalic  EYES: EOMI, conjunctiva and sclera clear  ENMT:  Moist mucous membranes,  NECK: Supple  NERVOUS SYSTEM:  Alert & Oriented X3,  Moves upper and lower extremities; CNS-II-XII  CHEST/LUNG: Clear to auscultation bilaterally; No rales, rhonchi, wheezing,   HEART: Regular rate and rhythm; No murmurs,   ABDOMEN: OBESE Soft, Nontender, Nondistended; Bowel sounds present  EXTREMITIES:  Peripheral Pulses,  psychiatry- mood and affect appropriate, Insight and judgement intact     acetaminophen     Tablet .. 650 milliGRAM(s) Oral every 6 hours PRN  albuterol    90 MICROgram(s) HFA Inhaler 2 Puff(s) Inhalation every 6 hours PRN  ALPRAZolam 0.5 milliGRAM(s) Oral at bedtime  aluminum hydroxide/magnesium hydroxide/simethicone Suspension 30 milliLiter(s) Oral every 4 hours PRN  dextrose 5%. 1000 milliLiter(s) IV Continuous <Continuous>  dextrose 5%. 1000 milliLiter(s) IV Continuous <Continuous>  dextrose 50% Injectable 25 Gram(s) IV Push once  dextrose 50% Injectable 12.5 Gram(s) IV Push once  dextrose 50% Injectable 25 Gram(s) IV Push once  dextrose Oral Gel 15 Gram(s) Oral once PRN  glucagon  Injectable 1 milliGRAM(s) IntraMuscular once  heparin   Injectable 5000 Unit(s) SubCutaneous every 8 hours  influenza   Vaccine 0.5 milliLiter(s) IntraMuscular once  insulin lispro (ADMELOG) corrective regimen sliding scale   SubCutaneous three times a day before meals  insulin lispro (ADMELOG) corrective regimen sliding scale   SubCutaneous at bedtime  lactated ringers. 1000 milliLiter(s) IV Continuous <Continuous>  melatonin 3 milliGRAM(s) Oral at bedtime PRN  ondansetron Injectable 4 milliGRAM(s) IV Push every 8 hours PRN  oxyCODONE    IR 10 milliGRAM(s) Oral every 6 hours PRN  oxyCODONE    IR 5 milliGRAM(s) Oral every 4 hours PRN  pantoprazole  Injectable 40 milliGRAM(s) IV Push every 12 hours  sertraline 50 milliGRAM(s) Oral daily      LABS:                          10.2   12.46 )-----------( 270      ( 29 Nov 2024 11:20 )             32.4     11-30    136  |  97  |  39.3[H]  ----------------------------<  260[H]  3.7   |  24.0  |  1.41[H]    Ca    8.7      30 Nov 2024 12:07  Mg     1.9     11-29    TPro  7.0  /  Alb  3.6  /  TBili  <0.2[L]  /  DBili  x   /  AST  20  /  ALT  24  /  AlkPhos  70  11-30    LIVER FUNCTIONS - ( 30 Nov 2024 12:07 )  Alb: 3.6 g/dL / Pro: 7.0 g/dL / ALK PHOS: 70 U/L / ALT: 24 U/L / AST: 20 U/L / GGT: x                 Urinalysis Basic - ( 30 Nov 2024 12:07 )    Color: x / Appearance: x / SG: x / pH: x  Gluc: 260 mg/dL / Ketone: x  / Bili: x / Urobili: x   Blood: x / Protein: x / Nitrite: x   Leuk Esterase: x / RBC: x / WBC x   Sq Epi: x / Non Sq Epi: x / Bacteria: x      CAPILLARY BLOOD GLUCOSE      POCT Blood Glucose.: 268 mg/dL (30 Nov 2024 11:43)  POCT Blood Glucose.: 210 mg/dL (30 Nov 2024 08:18)  POCT Blood Glucose.: 192 mg/dL (29 Nov 2024 21:43)  POCT Blood Glucose.: 211 mg/dL (29 Nov 2024 17:24)      RADIOLOGY & ADDITIONAL TESTS:      Consultant notes reviewed  I independently reviwed all images/ labarotory results /cultures/ telemetry/EKG and my findings are indicated above  and discussed care plan with consultants/nursing/ family /patient 
VONDA LAW    [id number]    54y      Female    CC: elder     INTERVAL HPI/OVERNIGHT EVENTS: Pt seen and examined. no acute events reported o/n     REVIEW OF SYSTEMS:    CONSTITUTIONAL: No fever, weight loss  RESPIRATORY: No wheezing, hemoptysis; No shortness of breath  CARDIOVASCULAR: No chest pain, palpitations  GASTROINTESTINAL: No abdominal or epigastric pain. No nausea, vomiting    Vital Signs Last 24 Hrs  T(C): 36.8 (01 Dec 2024 12:34), Max: 37.3 (01 Dec 2024 09:25)  T(F): 98.2 (01 Dec 2024 12:34), Max: 99.1 (01 Dec 2024 09:25)  HR: 92 (01 Dec 2024 12:34) (92 - 100)  BP: 114/75 (01 Dec 2024 12:34) (110/73 - 164/96)  BP(mean): 100 (01 Dec 2024 05:14) (94 - 100)  RR: 18 (01 Dec 2024 12:34) (18 - 20)  SpO2: 93% (01 Dec 2024 12:34) (91% - 97%)    Parameters below as of 01 Dec 2024 12:34  Patient On (Oxygen Delivery Method): room air        PHYSICAL EXAM:    GENERAL: NAD  CHEST/LUNG: Clear to auscultation bilaterally  HEART: S1S2+, Regular rate and rhythm  ABDOMEN: Soft, Nontender, Nondistended; Bowel sounds present  SKIN: warm, dry   NEURO: AAOX3, grossly non-focal     LABS:                        10.2   11.94 )-----------( 252      ( 01 Dec 2024 05:51 )             33.5     12-01    140  |  103  |  37.0[H]  ----------------------------<  207[H]  4.1   |  26.0  |  1.47[H]    Ca    9.0      01 Dec 2024 05:51    TPro  6.7  /  Alb  3.5  /  TBili  <0.2[L]  /  DBili  x   /  AST  17  /  ALT  22  /  AlkPhos  68  12-01      Urinalysis Basic - ( 01 Dec 2024 05:51 )    Color: x / Appearance: x / SG: x / pH: x  Gluc: 207 mg/dL / Ketone: x  / Bili: x / Urobili: x   Blood: x / Protein: x / Nitrite: x   Leuk Esterase: x / RBC: x / WBC x   Sq Epi: x / Non Sq Epi: x / Bacteria: x          MEDICATIONS  (STANDING):  ALPRAZolam 0.5 milliGRAM(s) Oral at bedtime  dextrose 5%. 1000 milliLiter(s) (50 mL/Hr) IV Continuous <Continuous>  dextrose 5%. 1000 milliLiter(s) (100 mL/Hr) IV Continuous <Continuous>  dextrose 50% Injectable 25 Gram(s) IV Push once  dextrose 50% Injectable 12.5 Gram(s) IV Push once  dextrose 50% Injectable 25 Gram(s) IV Push once  glucagon  Injectable 1 milliGRAM(s) IntraMuscular once  heparin   Injectable 5000 Unit(s) SubCutaneous every 8 hours  influenza   Vaccine 0.5 milliLiter(s) IntraMuscular once  insulin glargine Injectable (LANTUS) 5 Unit(s) SubCutaneous at bedtime  insulin lispro (ADMELOG) corrective regimen sliding scale   SubCutaneous three times a day before meals  insulin lispro (ADMELOG) corrective regimen sliding scale   SubCutaneous at bedtime  lactated ringers. 1000 milliLiter(s) (100 mL/Hr) IV Continuous <Continuous>  lidocaine   4% Patch 1 Patch Transdermal daily  pantoprazole  Injectable 40 milliGRAM(s) IV Push every 12 hours  sertraline 50 milliGRAM(s) Oral daily    MEDICATIONS  (PRN):  acetaminophen     Tablet .. 650 milliGRAM(s) Oral every 6 hours PRN Temp greater or equal to 38C (100.4F), Mild Pain (1 - 3)  albuterol    90 MICROgram(s) HFA Inhaler 2 Puff(s) Inhalation every 6 hours PRN for shortness of breath and/or wheezing  albuterol/ipratropium for Nebulization 3 milliLiter(s) Nebulizer every 6 hours PRN Shortness of Breath and/or Wheezing  aluminum hydroxide/magnesium hydroxide/simethicone Suspension 30 milliLiter(s) Oral every 4 hours PRN Dyspepsia  dextrose Oral Gel 15 Gram(s) Oral once PRN Blood Glucose LESS THAN 70 milliGRAM(s)/deciliter  melatonin 3 milliGRAM(s) Oral at bedtime PRN Insomnia  methocarbamol 500 milliGRAM(s) Oral every 8 hours PRN Muscle Spasm  ondansetron Injectable 4 milliGRAM(s) IV Push every 8 hours PRN Nausea and/or Vomiting  oxyCODONE    IR 10 milliGRAM(s) Oral every 6 hours PRN Severe Pain (7 - 10)  oxyCODONE    IR 5 milliGRAM(s) Oral every 4 hours PRN Moderate Pain (4 - 6)      RADIOLOGY & ADDITIONAL TESTS:  
VONDA LAW Patient is a 54y old  Female who presents with a chief complaint of BERTRAM (01 Dec 2024 09:28)     HPI:  53 y/o female with PMH of HTN, DM-2, chronic back pain, obesity, mood disorder came to the ED complaining of abdominal pain x 1 week. Pain is located in the epigastric area, radiating to the chest and back associated with nausea, vomiting, exacerbated by food. Patient reported been under stress in the past 1 week  due to loss of her mother. Of note, she went to urgent care about 2 weeks ago for cough/laryngitis which she has had for 3 weeks was told she did not have an infection but as per patient, continue to have persistent dry cough. She has no fever, chills, chest pain, melena, hematochezia, hematemesis, recent travel, sick contact, HA.  (28 Nov 2024 04:24)    The patient was seen and evaluated - AWAITS discharge -  will  today   The patient is in no acute distress.  Denied any fever chest pain, palpitations, shortness of breath, abdominal pain, fever, dysuria, cough, edema       I&O's Summary    02 Dec 2024 07:01  -  03 Dec 2024 07:00  --------------------------------------------------------  IN: 720 mL / OUT: 0 mL / NET: 720 mL    03 Dec 2024 07:01  -  03 Dec 2024 18:20  --------------------------------------------------------  IN: 400 mL / OUT: 1 mL / NET: 399 mL      Allergies    azithromycin (Unknown)    Intolerances      HEALTH ISSUES - PROBLEM Dx:        PAST MEDICAL & SURGICAL HISTORY:  HTN (hypertension)      Diabetes      S/P hernia repair      S/P dilation and curettage      S/P appendectomy              Vital Signs Last 24 Hrs  T(C): 36.5 (03 Dec 2024 15:58), Max: 37.2 (02 Dec 2024 21:33)  T(F): 97.7 (03 Dec 2024 15:58), Max: 98.9 (02 Dec 2024 21:33)  HR: 95 (03 Dec 2024 15:58) (93 - 101)  BP: 140/80 (03 Dec 2024 15:58) (129/84 - 147/84)  BP(mean): --  RR: 18 (03 Dec 2024 15:58) (18 - 18)  SpO2: 95% (03 Dec 2024 15:58) (92% - 95%)    Parameters below as of 03 Dec 2024 15:58  Patient On (Oxygen Delivery Method): room air    T(C): 36.5 (12-03-24 @ 15:58), Max: 37.2 (12-02-24 @ 21:33)  HR: 95 (12-03-24 @ 15:58) (93 - 101)  BP: 140/80 (12-03-24 @ 15:58) (129/84 - 147/84)  RR: 18 (12-03-24 @ 15:58) (18 - 18)  SpO2: 95% (12-03-24 @ 15:58) (92% - 95%)  Wt(kg): --    PHYSICAL EXAM:    GENERAL: NAD, eating  sitting edge of bed   HEAD:  Atraumatic, Normocephalic  EYES: EOMI, conjunctiva and sclera clear  ENMT:  Moist mucous membranes,  No lesions  NECK: Supple, No JVD, Normal thyroid  NERVOUS SYSTEM:  Alert & Oriented X3,  Moves upper and lower extremities; CNS-II-XII  CHEST/LUNG: Clear to auscultation bilaterally; No rales, rhonchi, wheezing,   HEART: Regular rate and rhythm; No murmurs,   ABDOMEN: obese Soft, Nontender, Nondistended; Bowel sounds present  EXTREMITIES:  Peripheral Pulses, No  cyanosis, or edema  psychiatry- mood and affect appropriate, Insight and judgement intact     acetaminophen     Tablet .. 650 milliGRAM(s) Oral every 6 hours PRN  albuterol    90 MICROgram(s) HFA Inhaler 2 Puff(s) Inhalation every 6 hours PRN  albuterol/ipratropium for Nebulization 3 milliLiter(s) Nebulizer every 6 hours PRN  ALPRAZolam 0.5 milliGRAM(s) Oral at bedtime  aluminum hydroxide/magnesium hydroxide/simethicone Suspension 30 milliLiter(s) Oral every 4 hours PRN  amLODIPine   Tablet 5 milliGRAM(s) Oral daily  dextrose 5%. 1000 milliLiter(s) IV Continuous <Continuous>  dextrose 5%. 1000 milliLiter(s) IV Continuous <Continuous>  dextrose 50% Injectable 25 Gram(s) IV Push once  dextrose 50% Injectable 12.5 Gram(s) IV Push once  dextrose 50% Injectable 25 Gram(s) IV Push once  dextrose Oral Gel 15 Gram(s) Oral once PRN  glucagon  Injectable 1 milliGRAM(s) IntraMuscular once  heparin   Injectable 5000 Unit(s) SubCutaneous every 8 hours  influenza   Vaccine 0.5 milliLiter(s) IntraMuscular once  insulin glargine Injectable (LANTUS) 5 Unit(s) SubCutaneous at bedtime  insulin lispro (ADMELOG) corrective regimen sliding scale   SubCutaneous three times a day before meals  insulin lispro (ADMELOG) corrective regimen sliding scale   SubCutaneous at bedtime  lactated ringers. 1000 milliLiter(s) IV Continuous <Continuous>  lidocaine   4% Patch 1 Patch Transdermal daily  melatonin 3 milliGRAM(s) Oral at bedtime PRN  methocarbamol 500 milliGRAM(s) Oral every 8 hours PRN  ondansetron Injectable 4 milliGRAM(s) IV Push every 8 hours PRN  oxyCODONE    IR 5 milliGRAM(s) Oral every 4 hours PRN  oxyCODONE    IR 10 milliGRAM(s) Oral every 6 hours PRN  pantoprazole  Injectable 40 milliGRAM(s) IV Push every 12 hours  sertraline 50 milliGRAM(s) Oral daily      LABS:                          10.5   11.70 )-----------( 251      ( 02 Dec 2024 06:05 )             33.9     12-02    139  |  103  |  27.0[H]  ----------------------------<  164[H]  3.9   |  24.0  |  1.17    Ca    8.1[L]      02 Dec 2024 06:05  Phos  3.3     12-02  Mg     1.5     12-02              Urinalysis Basic - ( 02 Dec 2024 06:05 )    Color: x / Appearance: x / SG: x / pH: x  Gluc: 164 mg/dL / Ketone: x  / Bili: x / Urobili: x   Blood: x / Protein: x / Nitrite: x   Leuk Esterase: x / RBC: x / WBC x   Sq Epi: x / Non Sq Epi: x / Bacteria: x      CAPILLARY BLOOD GLUCOSE      POCT Blood Glucose.: 194 mg/dL (03 Dec 2024 12:07)  POCT Blood Glucose.: 177 mg/dL (03 Dec 2024 08:11)  POCT Blood Glucose.: 179 mg/dL (02 Dec 2024 21:20)      RADIOLOGY & ADDITIONAL TESTS:      Consultant notes reviewed  I independently reviwed all images/ labarotory results /cultures/ telemetry/EKG and my findings are indicated above  and discussed care plan with consultants/nursing/ family /patient 
VONDA LAW Patient is a 54y old  Female who presents with a chief complaint of BERTRAM (29 Nov 2024 12:26)     HPI:  55 y/o female with PMH of HTN, DM-2, chronic back pain, obesity, mood disorder came to the ED complaining of abdominal pain x 1 week. Pain is located in the epigastric area, radiating to the chest and back associated with nausea, vomiting, exacerbated by food. Patient reported been under stress in the past 1 week  due to loss of her mother. Of note, she went to urgent care about 2 weeks ago for cough/laryngitis which she has had for 3 weeks was told she did not have an infection but as per patient, continue to have persistent dry cough. She has no fever, chills, chest pain, melena, hematochezia, hematemesis, recent travel, sick contact, HA.  (28 Nov 2024 04:24)    The patient was seen and evaluated -sitting edge of bed - eating lunch -  at bedside 0 states is worried about her kidney- assured is improving   The patient is in no acute distress.        I&O's Summary    28 Nov 2024 07:01  -  29 Nov 2024 07:00  --------------------------------------------------------  IN: 540 mL / OUT: 0 mL / NET: 540 mL    29 Nov 2024 07:01  -  29 Nov 2024 12:49  --------------------------------------------------------  IN: 225 mL / OUT: 200 mL / NET: 25 mL      Allergies    azithromycin (Unknown)    Intolerances      HEALTH ISSUES - PROBLEM Dx:        PAST MEDICAL & SURGICAL HISTORY:  HTN (hypertension)      Diabetes      S/P hernia repair      S/P dilation and curettage      S/P appendectomy              Vital Signs Last 24 Hrs  T(C): 37.4 (29 Nov 2024 08:12), Max: 37.5 (29 Nov 2024 00:10)  T(F): 99.4 (29 Nov 2024 08:12), Max: 99.5 (29 Nov 2024 00:10)  HR: 103 (29 Nov 2024 08:12) (88 - 103)  BP: 116/75 (29 Nov 2024 08:12) (97/62 - 140/81)  BP(mean): --  RR: 18 (29 Nov 2024 08:12) (18 - 18)  SpO2: 92% (29 Nov 2024 08:12) (91% - 95%)    Parameters below as of 29 Nov 2024 08:12  Patient On (Oxygen Delivery Method): room air    T(C): 37.4 (11-29-24 @ 08:12), Max: 37.5 (11-29-24 @ 00:10)  HR: 103 (11-29-24 @ 08:12) (88 - 103)  BP: 116/75 (11-29-24 @ 08:12) (97/62 - 140/81)  RR: 18 (11-29-24 @ 08:12) (18 - 18)  SpO2: 92% (11-29-24 @ 08:12) (91% - 95%)  Wt(kg): --    PHYSICAL EXAM:    GENERAL: NAD conversign pleasant   HEAD:  Atraumatic, Normocephalic  EYES: EOMI, conjunctiva and sclera clear  ENMT:  Moist mucous membranes,    NERVOUS SYSTEM:  Alert & Oriented X3,  Moves upper and lower extremities; CNS-II-XII  CHEST/LUNG: Clear to auscultation bilaterally; No rales, rhonchi, wheezing,   HEART: Regular rate and rhythm;   ABDOMEN: obese Soft, Nontender, Nondistended; Bowel sounds present  EXTREMITIES:  Peripheral Pulses, No  cyanosis, or edema  psychiatry- mood and affect appropriate, Insight and judgement intact     acetaminophen     Tablet .. 650 milliGRAM(s) Oral every 6 hours PRN  albuterol    90 MICROgram(s) HFA Inhaler 2 Puff(s) Inhalation every 6 hours PRN  ALPRAZolam 0.5 milliGRAM(s) Oral at bedtime  aluminum hydroxide/magnesium hydroxide/simethicone Suspension 30 milliLiter(s) Oral every 4 hours PRN  dextrose 5%. 1000 milliLiter(s) IV Continuous <Continuous>  dextrose 5%. 1000 milliLiter(s) IV Continuous <Continuous>  dextrose 50% Injectable 25 Gram(s) IV Push once  dextrose 50% Injectable 12.5 Gram(s) IV Push once  dextrose 50% Injectable 25 Gram(s) IV Push once  dextrose Oral Gel 15 Gram(s) Oral once PRN  glucagon  Injectable 1 milliGRAM(s) IntraMuscular once  heparin   Injectable 5000 Unit(s) SubCutaneous every 8 hours  influenza   Vaccine 0.5 milliLiter(s) IntraMuscular once  insulin lispro (ADMELOG) corrective regimen sliding scale   SubCutaneous three times a day before meals  insulin lispro (ADMELOG) corrective regimen sliding scale   SubCutaneous at bedtime  lactated ringers. 1000 milliLiter(s) IV Continuous <Continuous>  melatonin 3 milliGRAM(s) Oral at bedtime PRN  ondansetron Injectable 4 milliGRAM(s) IV Push every 8 hours PRN  oxyCODONE    IR 10 milliGRAM(s) Oral every 6 hours PRN  oxyCODONE    IR 5 milliGRAM(s) Oral every 4 hours PRN  pantoprazole  Injectable 40 milliGRAM(s) IV Push every 12 hours  sertraline 50 milliGRAM(s) Oral daily      LABS:                          10.2   12.46 )-----------( 270      ( 29 Nov 2024 11:20 )             32.4     11-29    136  |  100  |  55.5[H]  ----------------------------<  205[H]  3.8   |  21.0[L]  |  1.72[H]    Ca    8.9      29 Nov 2024 09:30  Mg     1.9     11-29    TPro  7.1  /  Alb  3.6  /  TBili  <0.2[L]  /  DBili  x   /  AST  18  /  ALT  22  /  AlkPhos  72  11-29    LIVER FUNCTIONS - ( 29 Nov 2024 09:30 )  Alb: 3.6 g/dL / Pro: 7.1 g/dL / ALK PHOS: 72 U/L / ALT: 22 U/L / AST: 18 U/L / GGT: x                 Urinalysis Basic - ( 29 Nov 2024 09:30 )    Color: x / Appearance: x / SG: x / pH: x  Gluc: 205 mg/dL / Ketone: x  / Bili: x / Urobili: x   Blood: x / Protein: x / Nitrite: x   Leuk Esterase: x / RBC: x / WBC x   Sq Epi: x / Non Sq Epi: x / Bacteria: x      CAPILLARY BLOOD GLUCOSE      POCT Blood Glucose.: 211 mg/dL (29 Nov 2024 11:51)  POCT Blood Glucose.: 179 mg/dL (29 Nov 2024 07:48)  POCT Blood Glucose.: 235 mg/dL (28 Nov 2024 21:44)  POCT Blood Glucose.: 214 mg/dL (28 Nov 2024 17:35)      RADIOLOGY & ADDITIONAL TESTS:      Consultant notes reviewed  I independently reviwed all images/ labarotory results /cultures/ telemetry/EKG and my findings are indicated above  and discussed care plan with consultants/nursing/ family /patient

## 2024-12-03 NOTE — PROGRESS NOTE ADULT - ASSESSMENT
53 y/o female with PMH of HTN, DM-2, chronic back pain, obesity, mood disorder came to the ED complaining of abdominal pain x 1 week. Pain is located in the epigastric area, radiating to the chest and back associated with nausea, vomiting, exacerbated by food.    Enlarged and prominent upper abdominal and retroperitoneal lymph nodes as of uncertain etiology. The differential diagnosis includes reactive lymphadenopathy, metastatic disease, and lymphoproliferative disorder. Hepatomegaly and severe hepatic steatosis.    BERTRAM - improving  Cr. 3.24 (baseline 0.6-1.33) -2.5-1.7- improving with IVF- todays labs pending   Likely NSAID induced, +Lisinopril-HCTZ 20-25mg (2 tabs) daily   Nephrology inpout appreciated  Avoid nephrotoxic agent   CT renal hunt as noted above     RUQ abdominal pain with nausea/vomiting -resolved  Likely due to gastritis (NSAID and stress induced)   As per diana VILLALOBOS, EGD done (10/17/24): mild antral gastritis, duodenitis.   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   Ropinirole     Chronic back pain   Oxycodone PRN on hold   Flexeril PRN     Supportive   DVT prophylaxis: Heparin sc      Dispo: when , home. pending renal function improvemnt -possibly sunday /monday   gastrtis pain and WBC improved and tolerating diet well        Attestation Statements:    Attestation Statements:  Time-based billing (NON-critical care).     45 minutes spent on total encounter. The necessity of the time spent during the encounter on this date of service was due to:     Time spent reviewing the chart documentation, reviewing labs and imaging studies, evaluating the patient, discussing the plan of care with the consultants & medical team, and documenting.    
54y/oF PMH HTN, DM-2, chronic back pain, obesity, mood disorder came to the ED complaining of abdominal pain x 1 week. Pain is located in the epigastric area, radiating to the chest and back associated with nausea, vomiting, exacerbated by food.    BERTRAM --Cr. 3.24 (baseline 0.6-1.33) - resolved - today Creatinine at baseline 1.1  -Likely NSAID induced, patient also on Lisinopril-HCTZ 20-25mg (2 tabs) daily   s/p IVF  -Nephrology discussed   -Avoid nephrotoxic agent   -CT renal noted for Lymphadenopathy    RUQ abdominal pain with nausea/vomiting , improved off metformin and off NSAIDS and on PPI  -Likely due to gastritis (NSAID and stress induced)   -As per diana VILLALOBOS, EGD done (10/17/24): mild antral gastritis, duodenitis.   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     Abdominal/retroperitoneal lymph node   -As noted on CT   -Likely reactive   -Discussed with patient, patient to follow up with PCP for repeat CT abdominal and hematology referral as needed     Leukocytosis , improving  -No clear source of infection   -cont to monitor off abx     HTN  -Lisinopril-HCTZ 20-25mg (2 tabs) daily, dc due to BERTRAM       DM-2   -Holding Metformin with high chance of gastroparesis   -Hba1c 8.5%  -Lantus 7 units QHS   out patient follow up with endocrine - DW patient - consider Ozempic- states she was told by PCP too    Mood disorder   -Zoloft 50mg   -Xanax 0.5mg HS     RLS?   -Ropinirole     Chronic back pain   -cont pain regimen  - Robaxin   -cont lidocaine patch       Dispo: home stable,   
53 y/o female with PMH of HTN, DM-2, chronic back pain, obesity, mood disorder came to ED complaining of epigastric abdominal pain for one week. She was having poor po intake, and has been taking ibuprofen 800 mg bid for many years. Upon presentation patient was found to be hypotensive with SBP<89. CT abdomen/pelvis showed intraabdominal and RP LAD. Her labs were also significant for BERTRAM with sCr 3.24, her sCr used to be 0.67 from 2017. She recieved 3L of NS, and her RAASi and diuretic was held. Her sCr continues to improve and approaching baseline      Plan:  May discontinue fluids and encourage PO intake  Monitor renal function, and resume lisinopril/HCTZ once at baseline, as needed for BP control    Sukh Julio MD
53 y/o female with PMH of HTN, DM-2, chronic back pain, obesity, mood disorder came to ED complaining of epigastric abdominal pain for one week. She was having poor po intake, and has been taking ibuprofen 800 mg bid for many years. Upon presentation patient was found to be hypotensive with SBP<89. CT abdomen/pelvis showed intraabdominal and RP LAD. Her labs were also significant for BERTRAM with sCr 3.24, her sCr used to be 0.67 from 2017. She recieved 3L of NS, and her RAASi and diuretic was held. Her sCr is improviing today. No signs of renal stones or obstruction on CT abdomen/pelvis noted.     Plan:    The patients renal function is improving   May discontinue fluids and encourage PO intake  Monitor renal function, and resume lisinopril/HCTZ once at baseline, as needed for BP controlDiscussed with primary team      Sukh Julio MD
54y/oF PMH HTN, DM-2, chronic back pain, obesity, mood disorder came to the ED complaining of abdominal pain x 1 week. Pain is located in the epigastric area, radiating to the chest and back associated with nausea, vomiting, exacerbated by food.    BERTRAM   -Cr. 3.24 (baseline 0.6-1.33)   -Likely NSAID induced, patient also on Lisinopril-HCTZ 20-25mg (2 tabs) daily   -3L of IVF given in the ED   -Nephrology recs appreciated   -Avoid nephrotoxic agent   -CT renal noted   -off IVF, encourage PO     RUQ abdominal pain with nausea/vomiting , improved   -Likely due to gastritis (NSAID and stress induced)   -As per diana VILLALOBOS, EGD done (10/17/24): mild antral gastritis, duodenitis.   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   -Ropinirole     Chronic back pain   -cont pain regimen  -trial robaxin   -add lidocaine patch       vte prophylaxis: Heparin sq      Dispo: home when stable, likely 1-2 days     pt's  updated at bedside 
53 y/o female with PMH of HTN, DM-2, chronic back pain, obesity, mood disorder came to ED complaining of epigastric abdominal pain for one week. She was having poor po intake, and has been taking ibuprofen 800 mg bid for many years. Upon presentation patient was found to be hypotensive with SBP<89. CT abdomen/pelvis showed intraabdominal and RP LAD. Her labs were also significant for BERTRAM with sCr 3.24, her sCr used to be 0.67 from 2017. She recieved 3L of NS, and her RAASi and diuretic was held. Her sCr continues to improve and approaching baseline      Plan:  May discontinue fluids and encourage PO intake  Monitor renal function, and resume lisinopril/HCTZ once at baseline, as needed for BP control    Sukh Julio MD
55 y/o female with PMH of HTN, DM-2, chronic back pain, obesity, mood disorder came to the ED complaining of abdominal pain x 1 week. Pain is located in the epigastric area, radiating to the chest and back associated with nausea, vomiting, exacerbated by food.    Enlarged and prominent upper abdominal and retroperitoneal lymph nodes as of uncertain etiology. The differential diagnosis includes reactive lymphadenopathy, metastatic disease, and lymphoproliferative disorder. Hepatomegaly and severe hepatic steatosis.      BERTRAM - improving  Cr. 3.24 (baseline 0.6-1.33) -2.5-1.7- improving with IVF  Likely NSAID induced, +Lisinopril-HCTZ 20-25mg (2 tabs) daily   Nephrology inpout appreciated  Avoid nephrotoxic agent   CT renal hunt as noted above     RUQ abdominal pain with nausea/vomiting   Likely due to gastritis (NSAID and stress induced)   As per diana VILLALOBOS, EGD done (10/17/24): mild antral gastritis, duodenitis.   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   Ropinirole     Chronic back pain   Oxycodone PRN on hold   Flexeril PRN     Supportive   DVT prophylaxis: Heparin sc  Diet: full liquid with gatritis abdominal pain, advance as arpit      Dispo: when , home. pending renal function improvemnt - gastrtis pain and WBC improvement and olerating diet

## 2024-12-04 ENCOUNTER — NON-APPOINTMENT (OUTPATIENT)
Age: 54
End: 2024-12-04

## 2024-12-05 PROBLEM — E11.9 TYPE 2 DIABETES MELLITUS WITHOUT COMPLICATIONS: Chronic | Status: ACTIVE | Noted: 2024-11-27

## 2024-12-12 RX ORDER — OXYCODONE AND ACETAMINOPHEN 5; 325 MG/1; MG/1
1 TABLET ORAL
Refills: 0 | DISCHARGE

## 2024-12-23 ENCOUNTER — APPOINTMENT (OUTPATIENT)
Dept: GASTROENTEROLOGY | Facility: CLINIC | Age: 54
End: 2024-12-23
Payer: MEDICAID

## 2024-12-23 VITALS
TEMPERATURE: 97.6 F | BODY MASS INDEX: 47.09 KG/M2 | OXYGEN SATURATION: 96 % | WEIGHT: 293 LBS | SYSTOLIC BLOOD PRESSURE: 176 MMHG | DIASTOLIC BLOOD PRESSURE: 98 MMHG | RESPIRATION RATE: 14 BRPM | HEIGHT: 66 IN | HEART RATE: 98 BPM

## 2024-12-23 DIAGNOSIS — K21.9 GASTRO-ESOPHAGEAL REFLUX DISEASE W/OUT ESOPHAGITIS: ICD-10-CM

## 2024-12-23 DIAGNOSIS — Z78.9 OTHER SPECIFIED HEALTH STATUS: ICD-10-CM

## 2024-12-23 DIAGNOSIS — Z86.39 PERSONAL HISTORY OF OTHER ENDOCRINE, NUTRITIONAL AND METABOLIC DISEASE: ICD-10-CM

## 2024-12-23 DIAGNOSIS — R93.89 ABNORMAL FINDINGS ON DIAGNOSTIC IMAGING OF OTHER SPECIFIED BODY STRUCTURES: ICD-10-CM

## 2024-12-23 DIAGNOSIS — Z83.438 FAMILY HISTORY OF OTHER DISORDER OF LIPOPROTEIN METABOLISM AND OTHER LIPIDEMIA: ICD-10-CM

## 2024-12-23 DIAGNOSIS — K76.0 FATTY (CHANGE OF) LIVER, NOT ELSEWHERE CLASSIFIED: ICD-10-CM

## 2024-12-23 DIAGNOSIS — R19.5 OTHER FECAL ABNORMALITIES: ICD-10-CM

## 2024-12-23 DIAGNOSIS — M54.50 LOW BACK PAIN, UNSPECIFIED: ICD-10-CM

## 2024-12-23 DIAGNOSIS — Z82.49 FAMILY HISTORY OF ISCHEMIC HEART DISEASE AND OTHER DISEASES OF THE CIRCULATORY SYSTEM: ICD-10-CM

## 2024-12-23 PROCEDURE — 99204 OFFICE O/P NEW MOD 45 MIN: CPT

## 2024-12-23 RX ORDER — PANTOPRAZOLE 40 MG/1
40 TABLET, DELAYED RELEASE ORAL
Refills: 0 | Status: ACTIVE | COMMUNITY

## 2024-12-23 RX ORDER — AMLODIPINE BESYLATE 5 MG/1
5 TABLET ORAL
Refills: 0 | Status: ACTIVE | COMMUNITY

## 2024-12-23 RX ORDER — INSULIN GLARGINE 100 [IU]/ML
INJECTION, SOLUTION SUBCUTANEOUS
Refills: 0 | Status: ACTIVE | COMMUNITY

## 2024-12-30 ENCOUNTER — APPOINTMENT (OUTPATIENT)
Dept: OTOLARYNGOLOGY | Facility: CLINIC | Age: 54
End: 2024-12-30
Payer: MEDICAID

## 2024-12-30 VITALS — HEIGHT: 66 IN | BODY MASS INDEX: 47.09 KG/M2 | WEIGHT: 293 LBS

## 2024-12-30 DIAGNOSIS — R49.0 DYSPHONIA: ICD-10-CM

## 2024-12-30 PROCEDURE — 31579 LARYNGOSCOPY TELESCOPIC: CPT

## 2024-12-30 PROCEDURE — 99203 OFFICE O/P NEW LOW 30 MIN: CPT | Mod: 25

## 2024-12-30 RX ORDER — METHOCARBAMOL 500 MG/1
500 TABLET, FILM COATED ORAL
Refills: 0 | Status: ACTIVE | COMMUNITY

## 2025-01-09 NOTE — PATIENT PROFILE ADULT - PATIENT REPRESENTATIVE: ( YOU CAN CHOOSE ANY PERSON THAT CAN ASSIST YOU WITH YOUR HEALTH CARE PREFERENCES, DOES NOT HAVE TO BE A SPOUSE, IMMEDIATE FAMILY OR SIGNIFICANT OTHER/PARTNER)
Quality 130: Documentation Of Current Medications In The Medical Record: Current Medications Documented Detail Level: Detailed same name as above Quality 431: Preventive Care And Screening: Unhealthy Alcohol Use - Screening: Patient not identified as an unhealthy alcohol user when screened for unhealthy alcohol use using a systematic screening method Quality 47: Advance Care Plan: Advance Care Planning discussed and documented in the medical record; patient did not wish or was not able to name a surrogate decision maker or provide an advance care plan. Quality 111:Pneumonia Vaccination Status For Older Adults: Patient received any pneumococcal conjugate or polysaccharide vaccine on or after their 60th birthday and before the end of the measurement period Quality 110: Preventive Care And Screening: Influenza Immunization: Influenza Immunization previously received during influenza season Quality 226: Preventive Care And Screening: Tobacco Use: Screening And Cessation Intervention: Patient screened for tobacco use and is an ex/non-smoker

## 2025-02-19 ENCOUNTER — APPOINTMENT (OUTPATIENT)
Dept: NEPHROLOGY | Facility: CLINIC | Age: 55
End: 2025-02-19

## 2025-04-11 ENCOUNTER — EMERGENCY (EMERGENCY)
Facility: HOSPITAL | Age: 55
LOS: 1 days | End: 2025-04-11
Attending: STUDENT IN AN ORGANIZED HEALTH CARE EDUCATION/TRAINING PROGRAM
Payer: COMMERCIAL

## 2025-04-11 VITALS
TEMPERATURE: 98 F | OXYGEN SATURATION: 95 % | HEART RATE: 95 BPM | SYSTOLIC BLOOD PRESSURE: 121 MMHG | RESPIRATION RATE: 18 BRPM | DIASTOLIC BLOOD PRESSURE: 82 MMHG

## 2025-04-11 VITALS
WEIGHT: 281.97 LBS | RESPIRATION RATE: 18 BRPM | TEMPERATURE: 98 F | HEIGHT: 66 IN | HEART RATE: 97 BPM | DIASTOLIC BLOOD PRESSURE: 71 MMHG | OXYGEN SATURATION: 96 % | SYSTOLIC BLOOD PRESSURE: 129 MMHG

## 2025-04-11 DIAGNOSIS — Z90.49 ACQUIRED ABSENCE OF OTHER SPECIFIED PARTS OF DIGESTIVE TRACT: Chronic | ICD-10-CM

## 2025-04-11 DIAGNOSIS — Z98.89 OTHER SPECIFIED POSTPROCEDURAL STATES: Chronic | ICD-10-CM

## 2025-04-11 LAB
ALBUMIN SERPL ELPH-MCNC: 3.7 G/DL — SIGNIFICANT CHANGE UP (ref 3.3–5.2)
ALP SERPL-CCNC: 97 U/L — SIGNIFICANT CHANGE UP (ref 40–120)
ALT FLD-CCNC: 13 U/L — SIGNIFICANT CHANGE UP
ANION GAP SERPL CALC-SCNC: 18 MMOL/L — HIGH (ref 5–17)
ANISOCYTOSIS BLD QL: ABNORMAL
APPEARANCE UR: CLEAR — SIGNIFICANT CHANGE UP
APTT BLD: 29.8 SEC — SIGNIFICANT CHANGE UP (ref 24.5–35.6)
AST SERPL-CCNC: 16 U/L — SIGNIFICANT CHANGE UP
BACTERIA # UR AUTO: NEGATIVE /HPF — SIGNIFICANT CHANGE UP
BASOPHILS # BLD AUTO: 0.03 K/UL — SIGNIFICANT CHANGE UP (ref 0–0.2)
BASOPHILS NFR BLD AUTO: 0.3 % — SIGNIFICANT CHANGE UP (ref 0–2)
BILIRUB DIRECT SERPL-MCNC: 0.1 MG/DL — SIGNIFICANT CHANGE UP (ref 0–0.3)
BILIRUB SERPL-MCNC: <0.2 MG/DL — LOW (ref 0.4–2)
BILIRUB UR-MCNC: NEGATIVE — SIGNIFICANT CHANGE UP
BUN SERPL-MCNC: 15.5 MG/DL — SIGNIFICANT CHANGE UP (ref 8–20)
CALCIUM SERPL-MCNC: 9.3 MG/DL — SIGNIFICANT CHANGE UP (ref 8.4–10.5)
CAST: 0 /LPF — SIGNIFICANT CHANGE UP (ref 0–4)
CHLORIDE SERPL-SCNC: 92 MMOL/L — LOW (ref 96–108)
CO2 SERPL-SCNC: 23 MMOL/L — SIGNIFICANT CHANGE UP (ref 22–29)
COLOR SPEC: YELLOW — SIGNIFICANT CHANGE UP
CREAT SERPL-MCNC: 0.87 MG/DL — SIGNIFICANT CHANGE UP (ref 0.5–1.3)
DIFF PNL FLD: NEGATIVE — SIGNIFICANT CHANGE UP
EGFR: 79 ML/MIN/1.73M2 — SIGNIFICANT CHANGE UP
EGFR: 79 ML/MIN/1.73M2 — SIGNIFICANT CHANGE UP
EOSINOPHIL # BLD AUTO: 0.2 K/UL — SIGNIFICANT CHANGE UP (ref 0–0.5)
EOSINOPHIL NFR BLD AUTO: 1.9 % — SIGNIFICANT CHANGE UP (ref 0–6)
GAS PNL BLDV: SIGNIFICANT CHANGE UP
GLUCOSE SERPL-MCNC: 141 MG/DL — HIGH (ref 70–99)
GLUCOSE UR QL: NEGATIVE MG/DL — SIGNIFICANT CHANGE UP
HCG SERPL-ACNC: <4 MIU/ML — SIGNIFICANT CHANGE UP
HCT VFR BLD CALC: 39.6 % — SIGNIFICANT CHANGE UP (ref 34.5–45)
HGB BLD-MCNC: 12 G/DL — SIGNIFICANT CHANGE UP (ref 11.5–15.5)
IMM GRANULOCYTES # BLD AUTO: 0.06 K/UL — SIGNIFICANT CHANGE UP (ref 0–0.07)
IMM GRANULOCYTES NFR BLD AUTO: 0.6 % — SIGNIFICANT CHANGE UP (ref 0–0.9)
INR BLD: 1.05 RATIO — SIGNIFICANT CHANGE UP (ref 0.85–1.16)
KETONES UR-MCNC: ABNORMAL MG/DL
LACTATE BLDV-MCNC: 1.9 MMOL/L — SIGNIFICANT CHANGE UP (ref 0.5–2)
LEUKOCYTE ESTERASE UR-ACNC: NEGATIVE — SIGNIFICANT CHANGE UP
LIDOCAIN IGE QN: 33 U/L — SIGNIFICANT CHANGE UP (ref 22–51)
LYMPHOCYTES # BLD AUTO: 0.99 K/UL — LOW (ref 1–3.3)
LYMPHOCYTES NFR BLD AUTO: 9.5 % — LOW (ref 13–44)
MCHC RBC-ENTMCNC: 21.2 PG — LOW (ref 27–34)
MCHC RBC-ENTMCNC: 30.3 G/DL — LOW (ref 32–36)
MCV RBC AUTO: 70 FL — LOW (ref 80–100)
MICROCYTES BLD QL: ABNORMAL
MONOCYTES # BLD AUTO: 0.51 K/UL — SIGNIFICANT CHANGE UP (ref 0–0.9)
MONOCYTES NFR BLD AUTO: 4.9 % — SIGNIFICANT CHANGE UP (ref 2–14)
NEUTROPHILS # BLD AUTO: 8.63 K/UL — HIGH (ref 1.8–7.4)
NEUTROPHILS NFR BLD AUTO: 82.8 % — HIGH (ref 43–77)
NITRITE UR-MCNC: NEGATIVE — SIGNIFICANT CHANGE UP
NRBC # BLD AUTO: 0 K/UL — SIGNIFICANT CHANGE UP (ref 0–0)
NRBC # FLD: 0 K/UL — SIGNIFICANT CHANGE UP (ref 0–0)
NRBC BLD AUTO-RTO: 0 /100 WBCS — SIGNIFICANT CHANGE UP (ref 0–0)
PH UR: 6.5 — SIGNIFICANT CHANGE UP (ref 5–8)
PLAT MORPH BLD: NORMAL — SIGNIFICANT CHANGE UP
PLATELET # BLD AUTO: 384 K/UL — SIGNIFICANT CHANGE UP (ref 150–400)
PMV BLD: 9.6 FL — SIGNIFICANT CHANGE UP (ref 7–13)
POIKILOCYTOSIS BLD QL AUTO: SLIGHT — SIGNIFICANT CHANGE UP
POLYCHROMASIA BLD QL SMEAR: SLIGHT — SIGNIFICANT CHANGE UP
POTASSIUM SERPL-MCNC: 3.8 MMOL/L — SIGNIFICANT CHANGE UP (ref 3.5–5.3)
POTASSIUM SERPL-SCNC: 3.8 MMOL/L — SIGNIFICANT CHANGE UP (ref 3.5–5.3)
PROT SERPL-MCNC: 7.6 G/DL — SIGNIFICANT CHANGE UP (ref 6.6–8.7)
PROT UR-MCNC: 30 MG/DL
PROTHROM AB SERPL-ACNC: 12.2 SEC — SIGNIFICANT CHANGE UP (ref 9.9–13.4)
RBC # BLD: 5.66 M/UL — HIGH (ref 3.8–5.2)
RBC # FLD: 18.2 % — HIGH (ref 10.3–14.5)
RBC BLD AUTO: ABNORMAL
RBC CASTS # UR COMP ASSIST: 1 /HPF — SIGNIFICANT CHANGE UP (ref 0–4)
SODIUM SERPL-SCNC: 133 MMOL/L — LOW (ref 135–145)
SP GR SPEC: 1.02 — SIGNIFICANT CHANGE UP (ref 1–1.03)
SQUAMOUS # UR AUTO: 8 /HPF — HIGH (ref 0–5)
UROBILINOGEN FLD QL: 1 MG/DL — SIGNIFICANT CHANGE UP (ref 0.2–1)
WBC # BLD: 10.42 K/UL — SIGNIFICANT CHANGE UP (ref 3.8–10.5)
WBC # FLD AUTO: 10.42 K/UL — SIGNIFICANT CHANGE UP (ref 3.8–10.5)
WBC UR QL: 3 /HPF — SIGNIFICANT CHANGE UP (ref 0–5)

## 2025-04-11 PROCEDURE — 85730 THROMBOPLASTIN TIME PARTIAL: CPT

## 2025-04-11 PROCEDURE — 83605 ASSAY OF LACTIC ACID: CPT

## 2025-04-11 PROCEDURE — 81001 URINALYSIS AUTO W/SCOPE: CPT

## 2025-04-11 PROCEDURE — 99285 EMERGENCY DEPT VISIT HI MDM: CPT

## 2025-04-11 PROCEDURE — 85018 HEMOGLOBIN: CPT

## 2025-04-11 PROCEDURE — 84702 CHORIONIC GONADOTROPIN TEST: CPT

## 2025-04-11 PROCEDURE — 82435 ASSAY OF BLOOD CHLORIDE: CPT

## 2025-04-11 PROCEDURE — 71045 X-RAY EXAM CHEST 1 VIEW: CPT | Mod: 26

## 2025-04-11 PROCEDURE — 82947 ASSAY GLUCOSE BLOOD QUANT: CPT

## 2025-04-11 PROCEDURE — 84132 ASSAY OF SERUM POTASSIUM: CPT

## 2025-04-11 PROCEDURE — 82803 BLOOD GASES ANY COMBINATION: CPT

## 2025-04-11 PROCEDURE — 71045 X-RAY EXAM CHEST 1 VIEW: CPT

## 2025-04-11 PROCEDURE — 80053 COMPREHEN METABOLIC PANEL: CPT

## 2025-04-11 PROCEDURE — 83690 ASSAY OF LIPASE: CPT

## 2025-04-11 PROCEDURE — 85025 COMPLETE CBC W/AUTO DIFF WBC: CPT

## 2025-04-11 PROCEDURE — 96376 TX/PRO/DX INJ SAME DRUG ADON: CPT

## 2025-04-11 PROCEDURE — 99284 EMERGENCY DEPT VISIT MOD MDM: CPT | Mod: 25

## 2025-04-11 PROCEDURE — 74177 CT ABD & PELVIS W/CONTRAST: CPT | Mod: MC

## 2025-04-11 PROCEDURE — 96375 TX/PRO/DX INJ NEW DRUG ADDON: CPT

## 2025-04-11 PROCEDURE — 85014 HEMATOCRIT: CPT

## 2025-04-11 PROCEDURE — 36415 COLL VENOUS BLD VENIPUNCTURE: CPT

## 2025-04-11 PROCEDURE — 74177 CT ABD & PELVIS W/CONTRAST: CPT | Mod: 26

## 2025-04-11 PROCEDURE — 76705 ECHO EXAM OF ABDOMEN: CPT | Mod: 26

## 2025-04-11 PROCEDURE — 82330 ASSAY OF CALCIUM: CPT

## 2025-04-11 PROCEDURE — 84295 ASSAY OF SERUM SODIUM: CPT

## 2025-04-11 PROCEDURE — 82248 BILIRUBIN DIRECT: CPT

## 2025-04-11 PROCEDURE — 76705 ECHO EXAM OF ABDOMEN: CPT

## 2025-04-11 PROCEDURE — 85610 PROTHROMBIN TIME: CPT

## 2025-04-11 PROCEDURE — 96374 THER/PROPH/DIAG INJ IV PUSH: CPT | Mod: XU

## 2025-04-11 RX ORDER — ACETAMINOPHEN 500 MG/5ML
1000 LIQUID (ML) ORAL ONCE
Refills: 0 | Status: COMPLETED | OUTPATIENT
Start: 2025-04-11 | End: 2025-04-11

## 2025-04-11 RX ORDER — SODIUM CHLORIDE 9 G/1000ML
1000 INJECTION, SOLUTION INTRAVENOUS ONCE
Refills: 0 | Status: COMPLETED | OUTPATIENT
Start: 2025-04-11 | End: 2025-04-11

## 2025-04-11 RX ORDER — ONDANSETRON HCL/PF 4 MG/2 ML
4 VIAL (ML) INJECTION ONCE
Refills: 0 | Status: COMPLETED | OUTPATIENT
Start: 2025-04-11 | End: 2025-04-11

## 2025-04-11 RX ADMIN — Medication 4 MILLIGRAM(S): at 20:45

## 2025-04-11 RX ADMIN — Medication 4 MILLIGRAM(S): at 16:32

## 2025-04-11 RX ADMIN — SODIUM CHLORIDE 1000 MILLILITER(S): 9 INJECTION, SOLUTION INTRAVENOUS at 16:32

## 2025-04-11 RX ADMIN — Medication 400 MILLIGRAM(S): at 20:46

## 2025-04-11 NOTE — ED PROVIDER NOTE - NSFOLLOWUPINSTRUCTIONS_ED_ALL_ED_FT
** Your findings are concerning for underlying malignant disease.  ** A referral coordinator will reach out to you to help you schedule an appointment with oncology for further workup.   ** Go to the nearest Emergency Department if you experience any new or concerning symptoms, such as:   - worsening pain  - chest pain  - difficulty breathing  - passing out  - unable to eat or drink  - unable to move or feel part of your body  - fever, chills

## 2025-04-11 NOTE — ED ADULT NURSE NOTE - OBJECTIVE STATEMENT
Pt is a 54y F, AOx4, presenting in [facility] w/abd pain, back pain, nausea and vomiting, and dizziness. Pt states dizziness began 2 weeks ago on a visit to [place], pt went to ED in [place] and was d/c w/vertigo. Back pain and abd pain began beginning of February when she began taking Ozempic. Pt states abd pain has progressively gotten worse, pt came today b/c pain worsened to unbearable level last night. Pt had 1 episode emesis yesterday when she ate. Last BM was 2 days ago. Pt states she has not slept in a few days. Pt denies chest pain, SOB, abd pain, back pain, fevers, diarrhea, and dysuria. PMH DM, HTN, anxiety, depression, asthma. Resp even and unlabored. Bed locked and in lowest position.

## 2025-04-11 NOTE — ED PROVIDER NOTE - PATIENT PORTAL LINK FT
You can access the FollowMyHealth Patient Portal offered by [facility] by registering at the following website: [email or web address]. By joining [facility]’s FollowMyHealth portal, you will also be able to view your health information using other applications (apps) compatible with our system.

## 2025-04-11 NOTE — ED ADULT NURSE NOTE - CHIEF COMPLAINT QUOTE
pt c/o dizziness, nausea and intermittent vomiting, upper abd pain, chills  x2 weeks last BM x2 days ago,

## 2025-04-11 NOTE — ED ADULT NURSE REASSESSMENT NOTE - NS ED NURSE REASSESS COMMENT FT1
Assumed care of pt from day RN. Patient a&ox3, no acute distress, resp nonlabored, resting comfortably in bed with family at bedside. C/o abd pain. MD Guzman notified, orders to follow. Denies headache, dizziness, chest pain, palpitations, SOB, n/v/d, urinary symptoms, fevers, chills, weakness at this time. Patient awaiting CT scan. Safety maintained.

## 2025-04-11 NOTE — ED PROVIDER NOTE - CLINICAL SUMMARY MEDICAL DECISION MAKING FREE TEXT BOX
R/O GB stone, pancreatitis, intraabdominal abnormality, Pyelo, UTI. Will check cbc, cmp, coags, lipase, cxr, UA. Will check vbg/lac. Will check CT ab/p with iv con. Given IVF. Symptom management and reassess.

## 2025-04-11 NOTE — ED PROVIDER NOTE - OBJECTIVE STATEMENT
A 53 yo F with pmh A 55 yo F with pmh HTN, DM2, on Ozempic, chronic back pain, mood disorder, fatty liver, cholelithiasis, came to the ED for RUQ, RLQ pain, R flank pain, intermittently for the past 2 weeks, getting worsen since last night. A 55 yo F with pmh HTN, DM2, on Ozempic, chronic back pain, mood disorder, fatty liver, cholelithiasis, came to the ED for RUQ, RLQ pain, R flank pain, intermittently for the past 2 weeks, getting worsen since last night. Also have some chills. Reportedly Pt had same symptoms 2 weeks ago, went to the ED in [place], had CT showing cholelithiasis. Had NB emesis once last night. Last BM was 3 days ago with NB diarrhea. Denies chest pain, palpitations, shortness of breath, cough, hematuria, dysuria, dark stools, focal neurologic symptoms.

## 2025-04-11 NOTE — ED PROVIDER NOTE - ATTENDING CONTRIBUTION TO CARE
Dr. Almanza: I personally saw the patient with the RESIDENT and performed a substantive portion of the visit. I performed a face to face bedside interview with patient regarding history of present illness, review of symptoms and past medical history. I completed an independent physical exam and all aspects of medical decision making. I have discussed patient's plan of care with resident. I agree with note as stated above, having amended the EMR as needed to reflect my findings. This includes HISTORY OF PRESENT ILLNESS, HIV, PAST MEDICAL/SURGICAL/FAMILY/SOCIAL HISTORY, ALLERGIES AND HOME MEDICATIONS, ROS, PHYSICAL EXAM, MEDICAL DECISION MAKING and any PROGRESS NOTES during the time I functioned as the attending physician for this patient.  SEE MDM

## 2025-04-11 NOTE — ED PROVIDER NOTE - PROGRESS NOTE DETAILS
Tamera Almanza MD, Attending  CT findings discussed with pt and  at beside.   Cancer referral placed. Pt will see PMD tomorrow.

## 2025-04-11 NOTE — ED PROVIDER NOTE - PHYSICAL EXAMINATION
VITALS: reviewed  GEN: No apparent distress, A & O x 4  HEAD/EYES: NC/AT, PERRL, EOMI, anicteric sclerae, no conjunctival pallor  ENT: mucus membranes moist, trachea midline, no JVD, neck is supple  RESP: lungs CTA with equal breath sounds bilaterally, chest wall nontender and atraumatic  CV: heart with reg rhythm S1, S2, no murmur; distal pulses intact and symmetric bilaterally  ABDOMEN: +Monge's. RUQ ttp, RLQ tto, R flank ttp. no guarding, no rebound, normoactive bowel sounds, soft, nondistended, no palpable masses  : R CVAT  MSK: extremities atraumatic and nontender, no edema, no asymmetry. the back is without midline or lateral tenderness, there is no spinal deformity or stepoff and the back is ranged painlessly. the neck has no midline tenderness, deformity, or stepoff, and is ranged painlessly.  SKIN: warm, dry, no rash, no bruising, no cyanosis.  NEURO: alert, mentating appropriately, no facial asymmetry. gross sensation, motor, coordination are intact  PSYCH: Affect appropriate

## 2025-04-11 NOTE — ED PROVIDER NOTE - NSICDXFAMILYHX_GEN_ALL_CORE_FT
FAMILY HISTORY:  Father  Still living? Unknown  FH: HTN (hypertension), Age at diagnosis: Age Unknown  FHx: hyperlipidemia, Age at diagnosis: Age Unknown    Mother  Still living? Unknown  FH: HTN (hypertension), Age at diagnosis: Age Unknown  FHx: hyperlipidemia, Age at diagnosis: Age Unknown

## 2025-04-14 ENCOUNTER — NON-APPOINTMENT (OUTPATIENT)
Age: 55
End: 2025-04-14

## 2025-04-15 ENCOUNTER — OUTPATIENT (OUTPATIENT)
Dept: OUTPATIENT SERVICES | Facility: HOSPITAL | Age: 55
LOS: 1 days | Discharge: HOME | End: 2025-04-15

## 2025-04-15 DIAGNOSIS — Z98.89 OTHER SPECIFIED POSTPROCEDURAL STATES: Chronic | ICD-10-CM

## 2025-04-15 DIAGNOSIS — Z01.812 ENCOUNTER FOR PREPROCEDURAL LABORATORY EXAMINATION: ICD-10-CM

## 2025-04-17 ENCOUNTER — APPOINTMENT (OUTPATIENT)
Dept: HEMATOLOGY ONCOLOGY | Facility: CLINIC | Age: 55
End: 2025-04-17
Payer: MEDICAID

## 2025-04-17 DIAGNOSIS — R59.1 GENERALIZED ENLARGED LYMPH NODES: ICD-10-CM

## 2025-04-17 PROCEDURE — 99214 OFFICE O/P EST MOD 30 MIN: CPT

## 2025-04-21 ENCOUNTER — APPOINTMENT (OUTPATIENT)
Dept: ULTRASOUND IMAGING | Facility: CLINIC | Age: 55
End: 2025-04-21
Payer: MEDICAID

## 2025-04-21 ENCOUNTER — OUTPATIENT (OUTPATIENT)
Dept: OUTPATIENT SERVICES | Facility: HOSPITAL | Age: 55
LOS: 1 days | End: 2025-04-21
Payer: MEDICAID

## 2025-04-21 ENCOUNTER — NON-APPOINTMENT (OUTPATIENT)
Age: 55
End: 2025-04-21

## 2025-04-21 ENCOUNTER — APPOINTMENT (OUTPATIENT)
Dept: MAMMOGRAPHY | Facility: CLINIC | Age: 55
End: 2025-04-21
Payer: MEDICAID

## 2025-04-21 DIAGNOSIS — Z98.89 OTHER SPECIFIED POSTPROCEDURAL STATES: Chronic | ICD-10-CM

## 2025-04-21 DIAGNOSIS — Z12.39 ENCOUNTER FOR OTHER SCREENING FOR MALIGNANT NEOPLASM OF BREAST: ICD-10-CM

## 2025-04-21 DIAGNOSIS — Z00.8 ENCOUNTER FOR OTHER GENERAL EXAMINATION: ICD-10-CM

## 2025-04-21 DIAGNOSIS — Z90.49 ACQUIRED ABSENCE OF OTHER SPECIFIED PARTS OF DIGESTIVE TRACT: Chronic | ICD-10-CM

## 2025-04-21 PROCEDURE — 77063 BREAST TOMOSYNTHESIS BI: CPT

## 2025-04-21 PROCEDURE — 77067 SCR MAMMO BI INCL CAD: CPT | Mod: 26

## 2025-04-21 PROCEDURE — 77067 SCR MAMMO BI INCL CAD: CPT

## 2025-04-21 PROCEDURE — 77063 BREAST TOMOSYNTHESIS BI: CPT | Mod: 26

## 2025-04-23 ENCOUNTER — OUTPATIENT (OUTPATIENT)
Dept: OUTPATIENT SERVICES | Facility: HOSPITAL | Age: 55
LOS: 1 days | End: 2025-04-23
Payer: COMMERCIAL

## 2025-04-23 VITALS
RESPIRATION RATE: 18 BRPM | DIASTOLIC BLOOD PRESSURE: 88 MMHG | WEIGHT: 271.17 LBS | HEART RATE: 99 BPM | TEMPERATURE: 98 F | SYSTOLIC BLOOD PRESSURE: 130 MMHG | HEIGHT: 66 IN | OXYGEN SATURATION: 95 %

## 2025-04-23 DIAGNOSIS — R59.1 GENERALIZED ENLARGED LYMPH NODES: ICD-10-CM

## 2025-04-23 DIAGNOSIS — N17.9 ACUTE KIDNEY FAILURE, UNSPECIFIED: ICD-10-CM

## 2025-04-23 DIAGNOSIS — I10 ESSENTIAL (PRIMARY) HYPERTENSION: ICD-10-CM

## 2025-04-23 DIAGNOSIS — Z01.818 ENCOUNTER FOR OTHER PREPROCEDURAL EXAMINATION: ICD-10-CM

## 2025-04-23 DIAGNOSIS — K21.9 GASTRO-ESOPHAGEAL REFLUX DISEASE WITHOUT ESOPHAGITIS: ICD-10-CM

## 2025-04-23 DIAGNOSIS — Z29.9 ENCOUNTER FOR PROPHYLACTIC MEASURES, UNSPECIFIED: ICD-10-CM

## 2025-04-23 DIAGNOSIS — E11.9 TYPE 2 DIABETES MELLITUS WITHOUT COMPLICATIONS: ICD-10-CM

## 2025-04-23 DIAGNOSIS — J45.909 UNSPECIFIED ASTHMA, UNCOMPLICATED: ICD-10-CM

## 2025-04-23 DIAGNOSIS — Z98.89 OTHER SPECIFIED POSTPROCEDURAL STATES: Chronic | ICD-10-CM

## 2025-04-23 DIAGNOSIS — Z86.59 PERSONAL HISTORY OF OTHER MENTAL AND BEHAVIORAL DISORDERS: ICD-10-CM

## 2025-04-23 DIAGNOSIS — Z90.49 ACQUIRED ABSENCE OF OTHER SPECIFIED PARTS OF DIGESTIVE TRACT: Chronic | ICD-10-CM

## 2025-04-23 LAB
A1C WITH ESTIMATED AVERAGE GLUCOSE RESULT: 7.3 % — HIGH (ref 4–5.6)
ANISOCYTOSIS BLD QL: SLIGHT — SIGNIFICANT CHANGE UP
BASOPHILS # BLD AUTO: 0.04 K/UL — SIGNIFICANT CHANGE UP (ref 0–0.2)
BASOPHILS NFR BLD AUTO: 0.4 % — SIGNIFICANT CHANGE UP (ref 0–2)
BLD GP AB SCN SERPL QL: SIGNIFICANT CHANGE UP
EOSINOPHIL # BLD AUTO: 0.22 K/UL — SIGNIFICANT CHANGE UP (ref 0–0.5)
EOSINOPHIL NFR BLD AUTO: 2.4 % — SIGNIFICANT CHANGE UP (ref 0–6)
ESTIMATED AVERAGE GLUCOSE: 163 MG/DL — HIGH (ref 68–114)
HCT VFR BLD CALC: 38.4 % — SIGNIFICANT CHANGE UP (ref 34.5–45)
HGB BLD-MCNC: 11.3 G/DL — LOW (ref 11.5–15.5)
HYPOCHROMIA BLD QL: SLIGHT — SIGNIFICANT CHANGE UP
IMM GRANULOCYTES # BLD AUTO: 0.05 K/UL — SIGNIFICANT CHANGE UP (ref 0–0.07)
IMM GRANULOCYTES NFR BLD AUTO: 0.5 % — SIGNIFICANT CHANGE UP (ref 0–0.9)
LYMPHOCYTES # BLD AUTO: 0.82 K/UL — LOW (ref 1–3.3)
LYMPHOCYTES NFR BLD AUTO: 8.9 % — LOW (ref 13–44)
MCHC RBC-ENTMCNC: 20.8 PG — LOW (ref 27–34)
MCHC RBC-ENTMCNC: 29.4 G/DL — LOW (ref 32–36)
MCV RBC AUTO: 70.6 FL — LOW (ref 80–100)
MICROCYTES BLD QL: ABNORMAL
MONOCYTES # BLD AUTO: 0.52 K/UL — SIGNIFICANT CHANGE UP (ref 0–0.9)
MONOCYTES NFR BLD AUTO: 5.6 % — SIGNIFICANT CHANGE UP (ref 2–14)
NEUTROPHILS # BLD AUTO: 7.58 K/UL — HIGH (ref 1.8–7.4)
NEUTROPHILS NFR BLD AUTO: 82.2 % — HIGH (ref 43–77)
NRBC # BLD AUTO: 0 K/UL — SIGNIFICANT CHANGE UP (ref 0–0)
NRBC # FLD: 0 K/UL — SIGNIFICANT CHANGE UP (ref 0–0)
NRBC BLD AUTO-RTO: 0 /100 WBCS — SIGNIFICANT CHANGE UP (ref 0–0)
OVALOCYTES BLD QL SMEAR: SLIGHT — SIGNIFICANT CHANGE UP
PLAT MORPH BLD: NORMAL — SIGNIFICANT CHANGE UP
PLATELET # BLD AUTO: 405 K/UL — HIGH (ref 150–400)
PMV BLD: 9.4 FL — SIGNIFICANT CHANGE UP (ref 7–13)
POIKILOCYTOSIS BLD QL AUTO: SLIGHT — SIGNIFICANT CHANGE UP
POLYCHROMASIA BLD QL SMEAR: SLIGHT — SIGNIFICANT CHANGE UP
RBC # BLD: 5.44 M/UL — HIGH (ref 3.8–5.2)
RBC # FLD: 18.4 % — HIGH (ref 10.3–14.5)
RBC BLD AUTO: ABNORMAL
WBC # BLD: 9.23 K/UL — SIGNIFICANT CHANGE UP (ref 3.8–10.5)
WBC # FLD AUTO: 9.23 K/UL — SIGNIFICANT CHANGE UP (ref 3.8–10.5)

## 2025-04-23 PROCEDURE — 86901 BLOOD TYPING SEROLOGIC RH(D): CPT

## 2025-04-23 PROCEDURE — 36415 COLL VENOUS BLD VENIPUNCTURE: CPT

## 2025-04-23 PROCEDURE — 93010 ELECTROCARDIOGRAM REPORT: CPT

## 2025-04-23 PROCEDURE — 86850 RBC ANTIBODY SCREEN: CPT

## 2025-04-23 PROCEDURE — 86900 BLOOD TYPING SEROLOGIC ABO: CPT

## 2025-04-23 PROCEDURE — G0463: CPT

## 2025-04-23 PROCEDURE — 83036 HEMOGLOBIN GLYCOSYLATED A1C: CPT

## 2025-04-23 PROCEDURE — 93005 ELECTROCARDIOGRAM TRACING: CPT

## 2025-04-23 PROCEDURE — 85025 COMPLETE CBC W/AUTO DIFF WBC: CPT

## 2025-04-23 RX ORDER — ORAL SEMAGLUTIDE 14 MG/1
0.5 TABLET ORAL
Refills: 0 | DISCHARGE

## 2025-04-23 NOTE — H&P PST ADULT - LYMPHATIC
Pt A&Ox4. Pt speaks Beninese- family at bedside and able to make pt's necessary needs known. Pt is on lactated ringers @ 75ml/hr. Pt is asymptomatic. Pt is not c/o of dizziness. lying bp 146/88 hr 99, sitting  bp 117/80,  standing bp 95/63 hr 123 Attending Attestation (For Attendings USE Only)... No lymphadedenopathy

## 2025-04-23 NOTE — H&P PST ADULT - PROBLEM SELECTOR PLAN 1
Patient scheduled for CT guided retroperitoneal lymph node biopsy with anesthesia with Dr. Vieira on 4/29/25.

## 2025-04-23 NOTE — H&P PST ADULT - LOCATION OF BACK PAIN
[0] : 2) Feeling down, depressed, or hopeless: Not at all (0) [PHQ-2 Negative - No further assessment needed] : PHQ-2 Negative - No further assessment needed [Never] : Never [[id number]] : 0 sacroiliac L

## 2025-04-23 NOTE — H&P PST ADULT - NSICDXPASTMEDICALHX_GEN_ALL_CORE_FT
PAST MEDICAL HISTORY:  Asthma     Diabetes     HTN (hypertension)      PAST MEDICAL HISTORY:  BERTRAM (acute kidney injury)     Asthma     Diabetes     History of lymphadenopathy     HTN (hypertension)     Obesity

## 2025-04-23 NOTE — H&P PST ADULT - PROBLEM SELECTOR PLAN 8
Patient told to continue inhaler as usual   Pt told to bring inhaler DOS  Medical optimization pending.

## 2025-04-23 NOTE — H&P PST ADULT - PROBLEM SELECTOR PLAN 5
Patient told to continue inhaler as usual   Pt told to bring inhaler DOS  Medical optimization pending. Patient told to continue medications as usual up to night prior

## 2025-04-23 NOTE — H&P PST ADULT - PROBLEM SELECTOR PLAN 3
F/s day of surgery   hemoglobin A1C pending   Ozempic last dose 4/18  PT told to titrate insulin night prior as per prescriber recommendation.  Medical optimization pending

## 2025-04-23 NOTE — H&P PST ADULT - HISTORY OF PRESENT ILLNESS
54 Female presents for PST with PMH of T2DM on insulin, HTN, GERD, s/p hernia repair, s/p appendectomy. Patient had extensive lymphadenopathy seen on CT scan, concerning for malignancy. Patient presented to [facility] ED in November 2024 for severe abdominal pain, nausea and vomiting, present for a few months. CT scan showed enlarged and prominent upper abdominal and retroperitoneal lymph nodes as described above, of uncertain etiology. Patient then presented to her GI. Had repeat CT scan at [facility], which stated there was no lymphadenopathy seen. Patient then went to [place] to visit family in April 2025. At that time, was still having intermittent abdominal pain. Presented to ED there and had CT scan which showed extensive lymphadenopathy. On return to [place], patient presented to [facility] ED on April 2025 for abdominal pain and had repeat CT scan, which showed significantly increased size of the previously seen lymphadenopathy as well as development of large new pathologic lymph nodes in the celiac trunk, Compared to the prior CT from November 2024 interval significantly increased size of the previously seen lymphadenopathy as well as development of large new pathologic lymph nodes in the celiac trunk, kristy hepatis, mesenteric root, retrocrural, and the retroperitoneal regions, concerning for progression of underlying malignant disease Compared to the prior CT there is also development of new splenic lesions, concerning for metastatic disease." Patient scheduled for CT guided retroperitoneal lymph node biopsy with anesthesia with Dr. Vieira on 4/29/25. 54 Female presents for PST with PMH of T2DM on insulin, HTN, BERTRAM 11/2024, GERD, s/p hernia repair, s/p appendectomy. Patient had extensive lymphadenopathy seen on CT scan, concerning for malignancy. Patient presented to [facility] ED in November 2024 for severe abdominal pain, nausea and vomiting, present for a few months. CT scan showed enlarged and prominent upper abdominal and retroperitoneal lymph nodes as described above, of uncertain etiology. Patient then presented to her GI. Had repeat CT scan at [facility], which stated there was no lymphadenopathy seen. Patient then went to [place] to visit family in April 2025. At that time, was still having intermittent abdominal pain. Presented to ED there and had CT scan which showed extensive lymphadenopathy. On return to [place], patient presented to [facility] ED on April 2025 for abdominal pain and had repeat CT scan, which showed significantly increased size of the previously seen lymphadenopathy as well as development of large new pathologic lymph nodes in the celiac trunk, Compared to the prior CT from November 2024 interval significantly increased size of the previously seen lymphadenopathy as well as development of large new pathologic lymph nodes in the celiac trunk, kristy hepatis, mesenteric root, retrocrural, and the retroperitoneal regions, concerning for progression of underlying malignant disease Compared to the prior CT there is also development of new splenic lesions, concerning for metastatic disease." Patient scheduled for CT guided retroperitoneal lymph node biopsy with anesthesia with Dr. Vieira on 4/29/25.

## 2025-04-23 NOTE — H&P PST ADULT - MUSCULOSKELETAL
decreased ROM/decreased ROM due to pain/strength 5/5 bilateral upper extremities/decreased strength details…

## 2025-04-23 NOTE — H&P PST ADULT - ASSESSMENT
Patient educated on surgical scrub, preadmission instructions, medical clearance and day of procedure medications, pt. verbalizes understanding and agreement. Pt. to have medical optimization with Dr. Leger and pt. verbalized agreement and understanding. Pt told to stop all vitamins, supplements, and NSAIDs starting 5 days prior to procedure.       CAPRINI SCORE    AGE RELATED RISK FACTORS                                                             [x ] Age 41-60 years                                            (1 Point)  [ ] Age: 61-74 years                                           (2 Points)                 [ ] Age= 75 years                                                (3 Points)             DISEASE RELATED RISK FACTORS                                                       [ ] Edema in the lower extremities                 (1 Point)                     [ ] Varicose veins                                               (1 Point)                                 [x ] BMI > 25 Kg/m2                                            (1 Point)                                  [ ] Serious infection (ie PNA)                            (1 Point)                     [ ] Lung disease ( COPD, Emphysema)            (1 Point)                                                                          [ ] Acute myocardial infarction                         (1 Point)                  [ ] Congestive heart failure (in the previous month)  (1 Point)         [ ] Inflammatory bowel disease                            (1 Point)                  [ ] Central venous access, PICC or Port               (2 points)       (within the last month)                                                                [ ] Stroke (in the previous month)                        (5 Points)    [ x] Previous or present malignancy                       (2 points)                                                                                                                                                         HEMATOLOGY RELATED FACTORS                                                         [ ] Prior episodes of VTE                                     (3 Points)                     [ ] Positive family history for VTE                      (3 Points)                  [ ] Prothrombin 44014 A                                     (3 Points)                     [ ] Factor V Leiden                                                (3 Points)                        [ ] Lupus anticoagulants                                      (3 Points)                                                           [ ] Anticardiolipin antibodies                              (3 Points)                                                       [ ] High homocysteine in the blood                   (3 Points)                                             [ ] Other congenital or acquired thrombophilia      (3 Points)                                                [ ] Heparin induced thrombocytopenia                  (3 Points)                                        MOBILITY RELATED FACTORS  [ ] Bed rest                                                         (1 Point)  [ ] Plaster cast                                                    (2 points)  [ ] Bed bound for more than 72 hours           (2 Points)    GENDER SPECIFIC FACTORS  [ ] Pregnancy or had a baby within the last month   (1 Point)  [ ] Post-partum < 6 weeks                                   (1 Point)  [ ] Hormonal therapy  or oral contraception   (1 Point)  [ ] History of pregnancy complications              (1 point)  [ ] Unexplained or recurrent              (1 Point)    OTHER RISK FACTORS                                           (1 Point)  [x ] BMI >40, smoking, diabetes requiring insulin, chemotherapy  blood transfusions and length of surgery over 2 hours    SURGERY RELATED RISK FACTORS  [ ]  Section within the last month     (1 Point)  [ ] Minor surgery                                                  (1 Point)  [ ] Arthroscopic surgery                                       (2 Points)  [x ] Planned major surgery lasting more            (2 Points)      than 45 minutes     [ ] Elective hip or knee joint replacement       (5 points)       surgery                                                TRAUMA RELATED RISK FACTORS  [ ] Fracture of the hip, pelvis, or leg                       (5 Points)  [ ] Spinal cord injury resulting in paralysis             (5 points)       (in the previous month)    [ ] Paralysis  (less than 1 month)                             (5 Points)  [ ] Multiple Trauma within 1 month                        (5 Points)    Total Score [     7   ]    Caprini Score 0-2: Low Risk, NO VTE prophylaxis required for most patients, encourage ambulation  Caprini Score 3-6: Moderate Risk , pharmacologic VTE prophylaxis is indicated for most patients (in the absence of contraindications)  Caprini Score Greater than or =7: High risk, pharmocologic VTE prophylaxis indicated for most patients (in the absence of contraindications)    OPIOID RISK TOOL    NAEL EACH BOX THAT APPLIES AND ADD TOTALS AT THE END    FAMILY HISTORY OF SUBSTANCE ABUSE                 FEMALE         MALE                                                Alcohol                             [  ]1 pt          [  ]3pts                                               Illegal Durgs                     [  ]2 pts        [  ]3pts                                               Rx Drugs                           [  ]4 pts        [  ]4 pts    PERSONAL HISTORY OF SUBSTANCE ABUSE                                                                                          Alcohol                             [  ]3 pts       [  ]3 pts                                               Illegal Drugs                     [  ]4 pts        [  ]4 pts                                               Rx Drugs                           [  ]5 pts        [  ]5 pts    AGE BETWEEN 16-45 YEARS                                      [  ]1 pt         [  ]1 pt    HISTORY OF PREADOLESCENT   SEXUAL ABUSE                                                             [  ]3 pts        [  ]0pts    PSYCHOLOGICAL DISEASE                     ADD, OCD, Bipolar, Schizophrenia        [  ]2 pts         [  ]2 pts                      Depression                                               [  ]1 pt           [  ]1 pt           SCORING TOTAL   (add numbers and type here)              (**0  *)                                     A score of 3 or lower indicated LOW risk for future opioid abuse  A score of 4 to 7 indicated moderate risk for future opioid abuse  A score of 8 or higher indicates a high risk for opioid abuse   Patient educated on surgical scrub, preadmission instructions, medical clearance and day of procedure medications, pt. verbalizes understanding and agreement. Pt. to have medical optimization with Dr. Leger and pt. verbalized agreement and understanding. Pt told to stop all vitamins, supplements, and NSAIDs starting 5 days prior to procedure.       CAPRINI SCORE    AGE RELATED RISK FACTORS                                                             [x ] Age 41-60 years                                            (1 Point)  [ ] Age: 61-74 years                                           (2 Points)                 [ ] Age= 75 years                                                (3 Points)             DISEASE RELATED RISK FACTORS                                                       [ ] Edema in the lower extremities                 (1 Point)                     [ ] Varicose veins                                               (1 Point)                                 [x ] BMI > 25 Kg/m2                                            (1 Point)                                  [ ] Serious infection (ie PNA)                            (1 Point)                     [ ] Lung disease ( COPD, Emphysema)            (1 Point)                                                                          [ ] Acute myocardial infarction                         (1 Point)                  [ ] Congestive heart failure (in the previous month)  (1 Point)         [ ] Inflammatory bowel disease                            (1 Point)                  [ ] Central venous access, PICC or Port               (2 points)       (within the last month)                                                                [ ] Stroke (in the previous month)                        (5 Points)    [ x] Previous or present malignancy                       (2 points)                                                                                                                                                         HEMATOLOGY RELATED FACTORS                                                         [ ] Prior episodes of VTE                                     (3 Points)                     [ ] Positive family history for VTE                      (3 Points)                  [ ] Prothrombin 87915 A                                     (3 Points)                     [ ] Factor V Leiden                                                (3 Points)                        [ ] Lupus anticoagulants                                      (3 Points)                                                           [ ] Anticardiolipin antibodies                              (3 Points)                                                       [ ] High homocysteine in the blood                   (3 Points)                                             [ ] Other congenital or acquired thrombophilia      (3 Points)                                                [ ] Heparin induced thrombocytopenia                  (3 Points)                                        MOBILITY RELATED FACTORS  [ ] Bed rest                                                         (1 Point)  [ ] Plaster cast                                                    (2 points)  [ ] Bed bound for more than 72 hours           (2 Points)    GENDER SPECIFIC FACTORS  [ ] Pregnancy or had a baby within the last month   (1 Point)  [ ] Post-partum < 6 weeks                                   (1 Point)  [ ] Hormonal therapy  or oral contraception   (1 Point)  [ ] History of pregnancy complications              (1 point)  [ ] Unexplained or recurrent              (1 Point)    OTHER RISK FACTORS                                           (1 Point)  [x ] BMI >40, smoking, diabetes requiring insulin, chemotherapy  blood transfusions and length of surgery over 2 hours    SURGERY RELATED RISK FACTORS  [ ]  Section within the last month     (1 Point)  [ ] Minor surgery                                                  (1 Point)  [ ] Arthroscopic surgery                                       (2 Points)  [x ] Planned major surgery lasting more            (2 Points)      than 45 minutes     [ ] Elective hip or knee joint replacement       (5 points)       surgery                                                TRAUMA RELATED RISK FACTORS  [ ] Fracture of the hip, pelvis, or leg                       (5 Points)  [ ] Spinal cord injury resulting in paralysis             (5 points)       (in the previous month)    [ ] Paralysis  (less than 1 month)                             (5 Points)  [ ] Multiple Trauma within 1 month                        (5 Points)    Total Score [     7   ]    Caprini Score 0-2: Low Risk, NO VTE prophylaxis required for most patients, encourage ambulation  Caprini Score 3-6: Moderate Risk , pharmacologic VTE prophylaxis is indicated for most patients (in the absence of contraindications)  Caprini Score Greater than or =7: High risk, pharmocologic VTE prophylaxis indicated for most patients (in the absence of contraindications)    OPIOID RISK TOOL    NAEL EACH BOX THAT APPLIES AND ADD TOTALS AT THE END    FAMILY HISTORY OF SUBSTANCE ABUSE                 FEMALE         MALE                                                Alcohol                             [  ]1 pt          [  ]3pts                                               Illegal Durgs                     [  ]2 pts        [  ]3pts                                               Rx Drugs                           [  ]4 pts        [  ]4 pts    PERSONAL HISTORY OF SUBSTANCE ABUSE                                                                                          Alcohol                             [  ]3 pts       [  ]3 pts                                               Illegal Drugs                     [  ]4 pts        [  ]4 pts                                               Rx Drugs                           [  ]5 pts        [  ]5 pts    AGE BETWEEN 16-45 YEARS                                      [  ]1 pt         [  ]1 pt    HISTORY OF PREADOLESCENT   SEXUAL ABUSE                                                             [  ]3 pts        [  ]0pts    PSYCHOLOGICAL DISEASE                     ADD, OCD, Bipolar, Schizophrenia        [  ]2 pts         [  ]2 pts                      Depression                                               [  ]1 pt           [  ]1 pt           SCORING TOTAL   (add numbers and type here)              (**0  *)                                     A score of 3 or lower indicated LOW risk for future opioid abuse  A score of 4 to 7 indicated moderate risk for future opioid abuse  A score of 8 or higher indicates a high risk for opioid abuse   54 Female presents for PST with PMH of T2DM on insulin, HTN, BERTRAM 2024, GERD, s/p hernia repair, s/p appendectomy. Patient had extensive lymphadenopathy seen on CT scan, concerning for malignancy. Patient presented to [facility] ED in 2024 for severe abdominal pain, nausea and vomiting, present for a few months. CT scan showed enlarged and prominent upper abdominal and retroperitoneal lymph nodes as described above, of uncertain etiology. Patient then presented to her GI. Had repeat CT scan at [facility], which stated there was no lymphadenopathy seen. Patient then went to [place] to visit family in 2025. At that time, was still having intermittent abdominal pain. Presented to ED there and had CT scan which showed extensive lymphadenopathy. On return to [place], patient presented to [facility] ED on 2025 for abdominal pain and had repeat CT scan, which showed significantly increased size of the previously seen lymphadenopathy as well as development of large new pathologic lymph nodes in the celiac trunk, Compared to the prior CT from 2024 interval significantly increased size of the previously seen lymphadenopathy as well as development of large new pathologic lymph nodes in the celiac trunk, kristy hepatis, mesenteric root, retrocrural, and the retroperitoneal regions, concerning for progression of underlying malignant disease Compared to the prior CT there is also development of new splenic lesions, concerning for metastatic disease." Patient scheduled for CT guided retroperitoneal lymph node biopsy with anesthesia with Dr. Vieira on 25.  Patient educated on surgical scrub, preadmission instructions, medical optimization and day of procedure medications, pt. verbalizes understanding and agreement. Pt. to have medical optimization with Dr. Leger and pt. verbalized agreement and understanding. Pt told to stop all vitamins, supplements, and NSAIDs starting 5 days prior to procedure.       CAPRINI SCORE    AGE RELATED RISK FACTORS                                                             [x ] Age 41-60 years                                            (1 Point)  [ ] Age: 61-74 years                                           (2 Points)                 [ ] Age= 75 years                                                (3 Points)             DISEASE RELATED RISK FACTORS                                                       [ ] Edema in the lower extremities                 (1 Point)                     [ ] Varicose veins                                               (1 Point)                                 [x ] BMI > 25 Kg/m2                                            (1 Point)                                  [ ] Serious infection (ie PNA)                            (1 Point)                     [ ] Lung disease ( COPD, Emphysema)            (1 Point)                                                                          [ ] Acute myocardial infarction                         (1 Point)                  [ ] Congestive heart failure (in the previous month)  (1 Point)         [ ] Inflammatory bowel disease                            (1 Point)                  [ ] Central venous access, PICC or Port               (2 points)       (within the last month)                                                                [ ] Stroke (in the previous month)                        (5 Points)    [ x] Previous or present malignancy                       (2 points)                                                                                                                                                         HEMATOLOGY RELATED FACTORS                                                         [ ] Prior episodes of VTE                                     (3 Points)                     [ ] Positive family history for VTE                      (3 Points)                  [ ] Prothrombin 11124 A                                     (3 Points)                     [ ] Factor V Leiden                                                (3 Points)                        [ ] Lupus anticoagulants                                      (3 Points)                                                           [ ] Anticardiolipin antibodies                              (3 Points)                                                       [ ] High homocysteine in the blood                   (3 Points)                                             [ ] Other congenital or acquired thrombophilia      (3 Points)                                                [ ] Heparin induced thrombocytopenia                  (3 Points)                                        MOBILITY RELATED FACTORS  [ ] Bed rest                                                         (1 Point)  [ ] Plaster cast                                                    (2 points)  [ ] Bed bound for more than 72 hours           (2 Points)    GENDER SPECIFIC FACTORS  [ ] Pregnancy or had a baby within the last month   (1 Point)  [ ] Post-partum < 6 weeks                                   (1 Point)  [ ] Hormonal therapy  or oral contraception   (1 Point)  [ ] History of pregnancy complications              (1 point)  [ ] Unexplained or recurrent              (1 Point)    OTHER RISK FACTORS                                           (1 Point)  [x ] BMI >40, smoking, diabetes requiring insulin, chemotherapy  blood transfusions and length of surgery over 2 hours    SURGERY RELATED RISK FACTORS  [ ]  Section within the last month     (1 Point)  [ ] Minor surgery                                                  (1 Point)  [ ] Arthroscopic surgery                                       (2 Points)  [x ] Planned major surgery lasting more            (2 Points)      than 45 minutes     [ ] Elective hip or knee joint replacement       (5 points)       surgery                                                TRAUMA RELATED RISK FACTORS  [ ] Fracture of the hip, pelvis, or leg                       (5 Points)  [ ] Spinal cord injury resulting in paralysis             (5 points)       (in the previous month)    [ ] Paralysis  (less than 1 month)                             (5 Points)  [ ] Multiple Trauma within 1 month                        (5 Points)    Total Score [     7   ]    Caprini Score 0-2: Low Risk, NO VTE prophylaxis required for most patients, encourage ambulation  Caprini Score 3-6: Moderate Risk , pharmacologic VTE prophylaxis is indicated for most patients (in the absence of contraindications)  Caprini Score Greater than or =7: High risk, pharmocologic VTE prophylaxis indicated for most patients (in the absence of contraindications)    OPIOID RISK TOOL    NAEL EACH BOX THAT APPLIES AND ADD TOTALS AT THE END    FAMILY HISTORY OF SUBSTANCE ABUSE                 FEMALE         MALE                                                Alcohol                             [  ]1 pt          [  ]3pts                                               Illegal Durgs                     [  ]2 pts        [  ]3pts                                               Rx Drugs                           [  ]4 pts        [  ]4 pts    PERSONAL HISTORY OF SUBSTANCE ABUSE                                                                                          Alcohol                             [  ]3 pts       [  ]3 pts                                               Illegal Drugs                     [  ]4 pts        [  ]4 pts                                               Rx Drugs                           [  ]5 pts        [  ]5 pts    AGE BETWEEN 16-45 YEARS                                      [  ]1 pt         [  ]1 pt    HISTORY OF PREADOLESCENT   SEXUAL ABUSE                                                             [  ]3 pts        [  ]0pts    PSYCHOLOGICAL DISEASE                     ADD, OCD, Bipolar, Schizophrenia        [  ]2 pts         [  ]2 pts                      Depression                                               [ x ]1 pt           [  ]1 pt           SCORING TOTAL   (add numbers and type here)              (*1*)                                     A score of 3 or lower indicated LOW risk for future opioid abuse  A score of 4 to 7 indicated moderate risk for future opioid abuse  A score of 8 or higher indicates a high risk for opioid abuse

## 2025-04-24 DIAGNOSIS — D64.9 ANEMIA, UNSPECIFIED: ICD-10-CM

## 2025-04-24 LAB
24R-OH-CALCIDIOL SERPL-MCNC: 138 PG/ML
ACE BLD-CCNC: 71 U/L
AFP-TM SERPL-MCNC: 3.3 NG/ML
ALBUMIN MFR SERPL ELPH: 47.9 %
ALBUMIN SERPL ELPH-MCNC: 3.8 G/DL
ALBUMIN SERPL-MCNC: 3.3 G/DL
ALBUMIN/GLOB SERPL: 0.9 RATIO
ALP BLD-CCNC: 117 U/L
ALPHA1 GLOB MFR SERPL ELPH: 7 %
ALPHA1 GLOB SERPL ELPH-MCNC: 0.5 G/DL
ALPHA2 GLOB MFR SERPL ELPH: 15 %
ALPHA2 GLOB SERPL ELPH-MCNC: 1 G/DL
ALT SERPL-CCNC: 12 U/L
ANION GAP SERPL CALC-SCNC: 16 MMOL/L
APTT BLD: 30.9 SEC
AST SERPL-CCNC: 20 U/L
B-GLOBULIN MFR SERPL ELPH: 13.6 %
B-GLOBULIN SERPL ELPH-MCNC: 0.9 G/DL
BASOPHILS # BLD AUTO: 0.04 K/UL
BASOPHILS NFR BLD AUTO: 0.4 %
BILIRUB SERPL-MCNC: 0.2 MG/DL
BUN SERPL-MCNC: 17 MG/DL
CALCIUM SERPL-MCNC: 9.8 MG/DL
CANCER AG125 SERPL-ACNC: 24 U/ML
CANCER AG15-3 SERPL-ACNC: 13.1 U/ML
CANCER AG19-9 SERPL-ACNC: 11 U/ML
CANCER AG27-29 SERPL-ACNC: 14.2 U/ML
CEA SERPL-MCNC: 1.8 NG/ML
CHLORIDE SERPL-SCNC: 97 MMOL/L
CO2 SERPL-SCNC: 25 MMOL/L
CREAT SERPL-MCNC: 1.01 MG/DL
DEPRECATED KAPPA LC FREE/LAMBDA SER: 1.1 RATIO
EGFRCR SERPLBLD CKD-EPI 2021: 66 ML/MIN/1.73M2
EOSINOPHIL # BLD AUTO: 0.18 K/UL
EOSINOPHIL NFR BLD AUTO: 1.8 %
GAMMA GLOB FLD ELPH-MCNC: 1.1 G/DL
GAMMA GLOB MFR SERPL ELPH: 16.5 %
GLUCOSE SERPL-MCNC: 132 MG/DL
HAV IGM SER QL: NONREACTIVE
HBV CORE IGM SER QL: NONREACTIVE
HBV SURFACE AG SER QL: NONREACTIVE
HCG-TM SERPL-MCNC: <1 MIU/ML
HCT VFR BLD CALC: 38 %
HCV AB SER QL: NONREACTIVE
HCV S/CO RATIO: 0.1 S/CO
HGB BLD-MCNC: 11.3 G/DL
HIV1+2 AB SPEC QL IA.RAPID: NONREACTIVE
IGA SER QL IEP: 219 MG/DL
IGG SER QL IEP: 1315 MG/DL
IGM SER QL IEP: 87 MG/DL
IMM GRANULOCYTES NFR BLD AUTO: 0.4 %
INR PPP: 1.05 RATIO
INTERPRETATION SERPL IEP-IMP: NORMAL
KAPPA LC CSF-MCNC: 3.28 MG/DL
KAPPA LC SERPL-MCNC: 3.61 MG/DL
LDH SERPL-CCNC: 193 U/L
LYMPHOCYTES # BLD AUTO: 0.74 K/UL
LYMPHOCYTES NFR BLD AUTO: 7.3 %
M PROTEIN SPEC IFE-MCNC: NORMAL
M TB IFN-G BLD-IMP: NEGATIVE
MAN DIFF?: NORMAL
MCHC RBC-ENTMCNC: 21.4 PG
MCHC RBC-ENTMCNC: 29.7 G/DL
MCV RBC AUTO: 72.1 FL
MONOCYTES # BLD AUTO: 0.47 K/UL
MONOCYTES NFR BLD AUTO: 4.6 %
NEUTROPHILS # BLD AUTO: 8.64 K/UL
NEUTROPHILS NFR BLD AUTO: 85.5 %
PLATELET # BLD AUTO: 420 K/UL
POTASSIUM SERPL-SCNC: 4.3 MMOL/L
PROT SERPL-MCNC: 6.9 G/DL
PT BLD: 12.5 SEC
QUANTIFERON TB PLUS MITOGEN MINUS NIL: 1 IU/ML
QUANTIFERON TB PLUS NIL: 0.06 IU/ML
QUANTIFERON TB PLUS TB1 MINUS NIL: 0 IU/ML
QUANTIFERON TB PLUS TB2 MINUS NIL: 0 IU/ML
RBC # BLD: 5.27 M/UL
RBC # FLD: 19.3 %
SODIUM SERPL-SCNC: 138 MMOL/L
WBC # FLD AUTO: 10.11 K/UL

## 2025-04-24 RX ORDER — FERROUS SULFATE 137(45) MG
45 TABLET, EXTENDED RELEASE ORAL
Qty: 90 | Refills: 0 | Status: ACTIVE | COMMUNITY
Start: 2025-04-24 | End: 1900-01-01

## 2025-04-28 ENCOUNTER — NON-APPOINTMENT (OUTPATIENT)
Age: 55
End: 2025-04-28

## 2025-04-28 ENCOUNTER — EMERGENCY (EMERGENCY)
Facility: HOSPITAL | Age: 55
LOS: 1 days | End: 2025-04-28
Attending: EMERGENCY MEDICINE
Payer: COMMERCIAL

## 2025-04-28 VITALS
SYSTOLIC BLOOD PRESSURE: 150 MMHG | WEIGHT: 274.48 LBS | HEART RATE: 94 BPM | RESPIRATION RATE: 17 BRPM | HEIGHT: 66 IN | DIASTOLIC BLOOD PRESSURE: 91 MMHG | OXYGEN SATURATION: 94 % | TEMPERATURE: 98 F

## 2025-04-28 DIAGNOSIS — Z98.89 OTHER SPECIFIED POSTPROCEDURAL STATES: Chronic | ICD-10-CM

## 2025-04-28 DIAGNOSIS — Z90.49 ACQUIRED ABSENCE OF OTHER SPECIFIED PARTS OF DIGESTIVE TRACT: Chronic | ICD-10-CM

## 2025-04-28 LAB
ALBUMIN SERPL ELPH-MCNC: 3.7 G/DL — SIGNIFICANT CHANGE UP (ref 3.3–5.2)
ALP SERPL-CCNC: 142 U/L — HIGH (ref 40–120)
ALT FLD-CCNC: 24 U/L — SIGNIFICANT CHANGE UP
ANION GAP SERPL CALC-SCNC: 17 MMOL/L — SIGNIFICANT CHANGE UP (ref 5–17)
ANISOCYTOSIS BLD QL: ABNORMAL
APPEARANCE UR: CLEAR — SIGNIFICANT CHANGE UP
AST SERPL-CCNC: 25 U/L — SIGNIFICANT CHANGE UP
BASOPHILS # BLD AUTO: 0.03 K/UL — SIGNIFICANT CHANGE UP (ref 0–0.2)
BASOPHILS # BLD MANUAL: 0.19 K/UL — SIGNIFICANT CHANGE UP (ref 0–0.2)
BASOPHILS NFR BLD AUTO: 0.3 % — SIGNIFICANT CHANGE UP (ref 0–2)
BASOPHILS NFR BLD MANUAL: 1.7 % — SIGNIFICANT CHANGE UP (ref 0–2)
BILIRUB SERPL-MCNC: 0.3 MG/DL — LOW (ref 0.4–2)
BILIRUB UR-MCNC: NEGATIVE — SIGNIFICANT CHANGE UP
BUN SERPL-MCNC: 13 MG/DL — SIGNIFICANT CHANGE UP (ref 8–20)
CALCIUM SERPL-MCNC: 9.5 MG/DL — SIGNIFICANT CHANGE UP (ref 8.4–10.5)
CHLORIDE SERPL-SCNC: 94 MMOL/L — LOW (ref 96–108)
CO2 SERPL-SCNC: 24 MMOL/L — SIGNIFICANT CHANGE UP (ref 22–29)
COLOR SPEC: YELLOW — SIGNIFICANT CHANGE UP
CREAT SERPL-MCNC: 0.85 MG/DL — SIGNIFICANT CHANGE UP (ref 0.5–1.3)
DIFF PNL FLD: NEGATIVE — SIGNIFICANT CHANGE UP
EGFR: 81 ML/MIN/1.73M2 — SIGNIFICANT CHANGE UP
EGFR: 81 ML/MIN/1.73M2 — SIGNIFICANT CHANGE UP
EOSINOPHIL # BLD AUTO: 0.19 K/UL — SIGNIFICANT CHANGE UP (ref 0–0.5)
EOSINOPHIL # BLD MANUAL: 0.19 K/UL — SIGNIFICANT CHANGE UP (ref 0–0.5)
EOSINOPHIL NFR BLD AUTO: 1.7 % — SIGNIFICANT CHANGE UP (ref 0–6)
EOSINOPHIL NFR BLD MANUAL: 1.7 % — SIGNIFICANT CHANGE UP (ref 0–6)
GLUCOSE SERPL-MCNC: 107 MG/DL — HIGH (ref 70–99)
GLUCOSE UR QL: NEGATIVE MG/DL — SIGNIFICANT CHANGE UP
HCT VFR BLD CALC: 37.8 % — SIGNIFICANT CHANGE UP (ref 34.5–45)
HGB BLD-MCNC: 11.2 G/DL — LOW (ref 11.5–15.5)
IMM GRANULOCYTES # BLD AUTO: 0.05 K/UL — SIGNIFICANT CHANGE UP (ref 0–0.07)
IMM GRANULOCYTES NFR BLD AUTO: 0.5 % — SIGNIFICANT CHANGE UP (ref 0–0.9)
KETONES UR-MCNC: NEGATIVE MG/DL — SIGNIFICANT CHANGE UP
LACTATE SERPL-SCNC: 1 MMOL/L — SIGNIFICANT CHANGE UP (ref 0.5–2)
LEUKOCYTE ESTERASE UR-ACNC: NEGATIVE — SIGNIFICANT CHANGE UP
LIDOCAIN IGE QN: 32 U/L — SIGNIFICANT CHANGE UP (ref 22–51)
LYMPHOCYTES # BLD AUTO: 1.06 K/UL — SIGNIFICANT CHANGE UP (ref 1–3.3)
LYMPHOCYTES # BLD MANUAL: 0.68 K/UL — LOW (ref 1–3.3)
LYMPHOCYTES NFR BLD AUTO: 9.6 % — LOW (ref 13–44)
LYMPHOCYTES NFR BLD MANUAL: 6.1 % — LOW (ref 13–44)
MANUAL REACTIVE LYMPHOCYTES #: 0.29 K/UL — SIGNIFICANT CHANGE UP (ref 0–0.63)
MCHC RBC-ENTMCNC: 20.5 PG — LOW (ref 27–34)
MCHC RBC-ENTMCNC: 29.6 G/DL — LOW (ref 32–36)
MCV RBC AUTO: 69.2 FL — LOW (ref 80–100)
MICROCYTES BLD QL: ABNORMAL
MONOCYTES # BLD AUTO: 0.57 K/UL — SIGNIFICANT CHANGE UP (ref 0–0.9)
MONOCYTES # BLD MANUAL: 0.39 K/UL — SIGNIFICANT CHANGE UP (ref 0–0.9)
MONOCYTES NFR BLD AUTO: 5.1 % — SIGNIFICANT CHANGE UP (ref 2–14)
MONOCYTES NFR BLD MANUAL: 3.5 % — SIGNIFICANT CHANGE UP (ref 2–14)
NEUTROPHILS # BLD AUTO: 9.18 K/UL — HIGH (ref 1.8–7.4)
NEUTROPHILS # BLD MANUAL: 9.35 K/UL — HIGH (ref 1.8–7.4)
NEUTROPHILS NFR BLD AUTO: 82.8 % — HIGH (ref 43–77)
NEUTROPHILS NFR BLD MANUAL: 84.4 % — HIGH (ref 43–77)
NITRITE UR-MCNC: NEGATIVE — SIGNIFICANT CHANGE UP
NRBC # BLD AUTO: 0 K/UL — SIGNIFICANT CHANGE UP (ref 0–0)
NRBC # FLD: 0 K/UL — SIGNIFICANT CHANGE UP (ref 0–0)
NRBC BLD AUTO-RTO: 0 /100 WBCS — SIGNIFICANT CHANGE UP (ref 0–0)
PH UR: 7 — SIGNIFICANT CHANGE UP (ref 5–8)
PLAT MORPH BLD: NORMAL — SIGNIFICANT CHANGE UP
PLATELET # BLD AUTO: 422 K/UL — HIGH (ref 150–400)
PMV BLD: 9.7 FL — SIGNIFICANT CHANGE UP (ref 7–13)
POIKILOCYTOSIS BLD QL AUTO: SLIGHT — SIGNIFICANT CHANGE UP
POLYCHROMASIA BLD QL SMEAR: SLIGHT — SIGNIFICANT CHANGE UP
POTASSIUM SERPL-MCNC: 3.8 MMOL/L — SIGNIFICANT CHANGE UP (ref 3.5–5.3)
POTASSIUM SERPL-SCNC: 3.8 MMOL/L — SIGNIFICANT CHANGE UP (ref 3.5–5.3)
PROT SERPL-MCNC: 7.6 G/DL — SIGNIFICANT CHANGE UP (ref 6.6–8.7)
PROT UR-MCNC: SIGNIFICANT CHANGE UP MG/DL
RBC # BLD: 5.46 M/UL — HIGH (ref 3.8–5.2)
RBC # FLD: 18.2 % — HIGH (ref 10.3–14.5)
RBC BLD AUTO: ABNORMAL
SODIUM SERPL-SCNC: 134 MMOL/L — LOW (ref 135–145)
SP GR SPEC: 1.01 — SIGNIFICANT CHANGE UP (ref 1–1.03)
TROPONIN T, HIGH SENSITIVITY RESULT: 11 NG/L — SIGNIFICANT CHANGE UP (ref 0–51)
UROBILINOGEN FLD QL: 0.2 MG/DL — SIGNIFICANT CHANGE UP (ref 0.2–1)
VARIANT LYMPHS # BLD: 2.6 % — SIGNIFICANT CHANGE UP (ref 0–6)
VARIANT LYMPHS NFR BLD MANUAL: 2.6 % — SIGNIFICANT CHANGE UP (ref 0–6)
WBC # BLD: 11.08 K/UL — HIGH (ref 3.8–10.5)
WBC # FLD AUTO: 11.08 K/UL — HIGH (ref 3.8–10.5)

## 2025-04-28 PROCEDURE — 36415 COLL VENOUS BLD VENIPUNCTURE: CPT

## 2025-04-28 PROCEDURE — 74177 CT ABD & PELVIS W/CONTRAST: CPT | Mod: 26

## 2025-04-28 PROCEDURE — 96374 THER/PROPH/DIAG INJ IV PUSH: CPT | Mod: XU

## 2025-04-28 PROCEDURE — 99285 EMERGENCY DEPT VISIT HI MDM: CPT

## 2025-04-28 PROCEDURE — 85025 COMPLETE CBC W/AUTO DIFF WBC: CPT

## 2025-04-28 PROCEDURE — 83605 ASSAY OF LACTIC ACID: CPT

## 2025-04-28 PROCEDURE — 74177 CT ABD & PELVIS W/CONTRAST: CPT | Mod: MC

## 2025-04-28 PROCEDURE — 96375 TX/PRO/DX INJ NEW DRUG ADDON: CPT

## 2025-04-28 PROCEDURE — 83690 ASSAY OF LIPASE: CPT

## 2025-04-28 PROCEDURE — 96376 TX/PRO/DX INJ SAME DRUG ADON: CPT

## 2025-04-28 PROCEDURE — 84484 ASSAY OF TROPONIN QUANT: CPT

## 2025-04-28 PROCEDURE — 80053 COMPREHEN METABOLIC PANEL: CPT

## 2025-04-28 PROCEDURE — 99284 EMERGENCY DEPT VISIT MOD MDM: CPT | Mod: 25

## 2025-04-28 PROCEDURE — 81003 URINALYSIS AUTO W/O SCOPE: CPT

## 2025-04-28 RX ORDER — ACETAMINOPHEN 500 MG/5ML
1000 LIQUID (ML) ORAL ONCE
Refills: 0 | Status: COMPLETED | OUTPATIENT
Start: 2025-04-28 | End: 2025-04-28

## 2025-04-28 RX ORDER — KETOROLAC TROMETHAMINE 30 MG/ML
15 INJECTION, SOLUTION INTRAMUSCULAR; INTRAVENOUS ONCE
Refills: 0 | Status: DISCONTINUED | OUTPATIENT
Start: 2025-04-28 | End: 2025-04-28

## 2025-04-28 RX ORDER — SODIUM CHLORIDE 9 G/1000ML
1000 INJECTION, SOLUTION INTRAVENOUS ONCE
Refills: 0 | Status: COMPLETED | OUTPATIENT
Start: 2025-04-28 | End: 2025-04-28

## 2025-04-28 RX ORDER — ONDANSETRON HCL/PF 4 MG/2 ML
4 VIAL (ML) INJECTION ONCE
Refills: 0 | Status: COMPLETED | OUTPATIENT
Start: 2025-04-28 | End: 2025-04-28

## 2025-04-28 RX ADMIN — Medication 4 MILLIGRAM(S): at 22:25

## 2025-04-28 RX ADMIN — Medication 4 MILLIGRAM(S): at 19:54

## 2025-04-28 RX ADMIN — SODIUM CHLORIDE 1000 MILLILITER(S): 9 INJECTION, SOLUTION INTRAVENOUS at 19:54

## 2025-04-28 RX ADMIN — Medication 400 MILLIGRAM(S): at 23:17

## 2025-04-28 RX ADMIN — KETOROLAC TROMETHAMINE 15 MILLIGRAM(S): 30 INJECTION, SOLUTION INTRAMUSCULAR; INTRAVENOUS at 22:25

## 2025-04-28 RX ADMIN — KETOROLAC TROMETHAMINE 15 MILLIGRAM(S): 30 INJECTION, SOLUTION INTRAMUSCULAR; INTRAVENOUS at 23:17

## 2025-04-28 RX ADMIN — Medication 20 MILLIGRAM(S): at 19:54

## 2025-04-28 NOTE — ED ADULT TRIAGE NOTE - CHIEF COMPLAINT QUOTE
N/V, generalized abd pain, back pain x  > 1 month. seen in ED 4/11/24 had CT showing "splenic lesions, lymphadenopathy and concern for metastatic disease".

## 2025-04-28 NOTE — ED PROVIDER NOTE - NSFOLLOWUPINSTRUCTIONS_ED_ALL_ED_FT
Please follow up with your oncologist regarding the CT results and further workup.        What causes abdominal pain? The cause may not be found. The following are common causes:    Overeating, gas pains, or food poisoning    Constipation or diarrhea    An injury    Appendicitis, a hernia, or an ulcer    Infection or a blockage    A liver, gallbladder, or kidney condition  How is the cause of abdominal pain diagnosed? Your healthcare provider will check your abdomen. He or she will ask where your pain is and when it started. Tell him or her if the pain wakes you or stops you from doing your daily activities. Describe anything that makes the pain better or worse. You may also need any of the following:    Blood, urine, or bowel movement samples may be tested for signs of an infection, disease, or injury.    X-ray pictures of your abdomen may show an injury or other cause of the pain.  How is abdominal pain treated?    Prescription pain medicine may be given. Ask your healthcare provider how to take this medicine safely. Some prescription pain medicines contain acetaminophen. Do not take other medicines that contain acetaminophen without talking to your healthcare provider. Too much acetaminophen may cause liver damage. Prescription pain medicine may cause constipation. Ask your healthcare provider how to prevent or treat constipation.    Medicines may be given to calm your stomach or prevent vomiting.    Relaxation therapy may be used along with pain medicine.    Surgery may be needed, depending on the cause.  What can I do to manage or prevent abdominal pain?    Apply heat on your abdomen for 20 to 30 minutes every 2 hours for as many days as directed. Heat helps decrease pain and muscle spasms.    Make changes to the foods you eat, if needed. Do not eat foods that cause abdominal pain or other symptoms. Eat small meals more often. The following changes may also help:  Eat more high-fiber foods if you are constipated. High-fiber foods include fruits, vegetables, whole-grain foods, and legumes such as mccann beans.    Do not eat foods that cause gas if you have bloating. Examples include broccoli, cabbage, beans, and carbonated drinks.    Do not eat foods or drinks that contain sorbitol or fructose if you have diarrhea and bloating. Some examples are fruit juices, candy, jelly, and sugar-free gum.    Do not eat high-fat foods. Examples include fried foods, cheeseburgers, hot dogs, and desserts.    Make changes to the liquids you drink, if needed. Do not drink liquids that cause pain or make it worse, such as orange juice. Drink liquids throughout the day to stay hydrated. The following changes may also help:  Drink more liquids to prevent dehydration from diarrhea or vomiting. Ask your healthcare provider how much liquid to drink each day and which liquids are best for you.    Limit or do not have caffeine. Caffeine may make symptoms such as heartburn or nausea worse.    Limit or do not drink alcohol. Alcohol can make your abdominal pain worse. Ask your healthcare provider if it is okay for you to drink alcohol. Also ask how much is okay for you to drink. A drink of alcohol is 12 ounces of beer, ½ ounce of liquor, or 5 ounces of wine.    Keep a diary of your abdominal pain. A diary may help your healthcare provider learn what is causing your pain. Include when the pain happens, how long it lasts, and what the pain feels like. Write down any other symptoms you have with abdominal pain. Also write down what you eat, and any symptoms you have after you eat.    Manage stress. Stress may cause abdominal pain. Your healthcare provider may recommend relaxation techniques and deep breathing exercises to help decrease your stress. Your healthcare provider may recommend you talk to someone about your stress or anxiety, such as a counselor or a friend. Get plenty of sleep. Exercise regularly.    Do not smoke. Nicotine and other chemicals in cigarettes can damage your esophagus and stomach. Ask your healthcare provider for information if you currently smoke and need help to quit. E-cigarettes or smokeless tobacco still contain nicotine. Talk to your healthcare provider before you use these products.  Call your local emergency number (911 in the [place]) if:    You have chest pain or shortness of breath.    When should I seek immediate care?    You have pulsing pain in your upper abdomen or lower back that suddenly becomes constant.    Your pain is in the right lower abdominal area and worsens with movement.    You have a fever over 100.4°F (38°C) or shaking chills.    You are vomiting and cannot keep food or liquids down.    Your pain does not improve or gets worse over the next 8 to 12 hours.    You see blood in your vomit or bowel movements, or they look black and tarry.    Your skin or the whites of your eyes turn yellow.    You are a woman and have a large amount of vaginal bleeding that is not your monthly period.  When should I call my doctor?    You have pain in your lower back.    You are a man and have pain in your testicles.    You have pain when you urinate.    You have questions or concerns about your condition or care.

## 2025-04-28 NOTE — ED PROVIDER NOTE - OBJECTIVE STATEMENT
55 yo F with pmh HTN, DM2, on Ozempic, chronic back pain, mood disorder, fatty liver, cholelithiasis presents to the ED for worsening abdominal pain. Patient states that for the last month been experiencing worsening abdominal pain, associated with nausea, vomitting, decreased bowel movement and flatulence. +chills Patient states last time she was here she had enlarged lymph nodes. No chest pain. no palpitations. 53 yo F with pmh HTN, DM2, on Ozempic, chronic back pain, mood disorder, fatty liver, cholelithiasis presents to the ED for worsening abdominal pain. Patient states that for the last month been experiencing worsening abdominal pain, associated with nausea, vomiting, decreased bowel movement and flatulence. +chills Patient states last time she was here she had enlarged lymph nodes. No chest pain. no palpitations.

## 2025-04-28 NOTE — ED PROVIDER NOTE - PHYSICAL EXAMINATION
Gen: well appearing, no acute distress  Head: normocephalic, atraumatic  EENT: EOMI, moist mucous membranes, no scleral icterus  Lung: no increased work of breathing, clear to auscultation bilaterally, no wheezing, rales, rhonchi, speaking in full sentences  CV: regular rate, regular rhythm, normal s1/s2, 2+ radial pulses bilaterally  Abd: soft, +mildly tender, non-distended,  +umbilical hernia  MSK: No edema, no visible deformities, full range of motion in all 4 extremities  Neuro: Awake, alert, no focal neurologic deficits  Skin: No obvious rash, no jaundice

## 2025-04-28 NOTE — ED PROVIDER NOTE - PATIENT PORTAL LINK FT
You can access the FollowMyHealth Patient Portal offered by [facility] by registering at the following website: [email or web address]. By joining [facility]’s FollowMyHealth portal, you will also be able to view your health information using other applications (apps) compatible with our system.

## 2025-04-28 NOTE — ED PROVIDER NOTE - ATTENDING CONTRIBUTION TO CARE
53 yo F with pmh HTN, DM2, on Ozempic, chronic back pain, mood disorder, fatty liver, cholelithiasis presents to the ED for worsening abdominal pain. Patient states that for the last month been experiencing worsening abdominal pain, associated with nausea, vomiting, decreased bowel movement and flatulence. +chills Patient states last time she was here she had enlarged lymph nodes. No chest pain. no palpitations.    prior workups have noted worsening abdominal lymphadenopathy. pt scheduled for biopsy tomorrow. adenopathy likely the source of pts pain but given abd tpp will obtain labs, repeat ct, pain meds, if neg workup will dc with scheduled biopsy tomorrow

## 2025-04-28 NOTE — ED PROVIDER NOTE - CLINICAL SUMMARY MEDICAL DECISION MAKING FREE TEXT BOX
55 yo F with pmh HTN, DM2, on Ozempic, chronic back pain, mood disorder, fatty liver, cholelithiasis presents to the ED for worsening abdominal pain. Will do labs, CT abdomen concern for obstruction given history of enlarge lymph nodes and possible malignancy,  pain management. will reassess. if negative likely discharge home

## 2025-04-29 VITALS
TEMPERATURE: 98 F | RESPIRATION RATE: 17 BRPM | DIASTOLIC BLOOD PRESSURE: 80 MMHG | SYSTOLIC BLOOD PRESSURE: 126 MMHG | HEART RATE: 101 BPM | OXYGEN SATURATION: 95 %

## 2025-04-29 PROBLEM — K21.9 GASTRO-ESOPHAGEAL REFLUX DISEASE WITHOUT ESOPHAGITIS: Chronic | Status: ACTIVE | Noted: 2025-04-23

## 2025-04-29 PROBLEM — Z87.898 PERSONAL HISTORY OF OTHER SPECIFIED CONDITIONS: Chronic | Status: ACTIVE | Noted: 2025-04-23

## 2025-04-29 PROBLEM — J45.909 UNSPECIFIED ASTHMA, UNCOMPLICATED: Chronic | Status: ACTIVE | Noted: 2025-04-23

## 2025-04-29 PROBLEM — E66.9 OBESITY, UNSPECIFIED: Chronic | Status: ACTIVE | Noted: 2025-04-23

## 2025-04-29 PROBLEM — N17.9 ACUTE KIDNEY FAILURE, UNSPECIFIED: Chronic | Status: ACTIVE | Noted: 2025-04-23

## 2025-04-29 PROBLEM — Z86.59 PERSONAL HISTORY OF OTHER MENTAL AND BEHAVIORAL DISORDERS: Chronic | Status: ACTIVE | Noted: 2025-04-23

## 2025-04-29 RX ORDER — OXYCODONE HYDROCHLORIDE 30 MG/1
1 TABLET ORAL
Qty: 5 | Refills: 0
Start: 2025-04-29 | End: 2025-05-03

## 2025-05-06 ENCOUNTER — TRANSCRIPTION ENCOUNTER (OUTPATIENT)
Age: 55
End: 2025-05-06

## 2025-05-06 ENCOUNTER — OUTPATIENT (OUTPATIENT)
Dept: OUTPATIENT SERVICES | Facility: HOSPITAL | Age: 55
LOS: 1 days | End: 2025-05-06
Payer: COMMERCIAL

## 2025-05-06 ENCOUNTER — RESULT REVIEW (OUTPATIENT)
Age: 55
End: 2025-05-06

## 2025-05-06 VITALS
WEIGHT: 272.05 LBS | OXYGEN SATURATION: 96 % | SYSTOLIC BLOOD PRESSURE: 122 MMHG | HEART RATE: 95 BPM | TEMPERATURE: 98 F | HEIGHT: 66 IN | RESPIRATION RATE: 20 BRPM | DIASTOLIC BLOOD PRESSURE: 75 MMHG

## 2025-05-06 DIAGNOSIS — Z98.89 OTHER SPECIFIED POSTPROCEDURAL STATES: Chronic | ICD-10-CM

## 2025-05-06 DIAGNOSIS — R59.1 GENERALIZED ENLARGED LYMPH NODES: ICD-10-CM

## 2025-05-06 DIAGNOSIS — Z90.49 ACQUIRED ABSENCE OF OTHER SPECIFIED PARTS OF DIGESTIVE TRACT: Chronic | ICD-10-CM

## 2025-05-06 LAB — GLUCOSE BLDC GLUCOMTR-MCNC: 138 MG/DL — HIGH (ref 70–99)

## 2025-05-06 PROCEDURE — 88365 INSITU HYBRIDIZATION (FISH): CPT

## 2025-05-06 PROCEDURE — 88360 TUMOR IMMUNOHISTOCHEM/MANUAL: CPT

## 2025-05-06 PROCEDURE — 88307 TISSUE EXAM BY PATHOLOGIST: CPT

## 2025-05-06 PROCEDURE — 49180 BIOPSY ABDOMINAL MASS: CPT

## 2025-05-06 PROCEDURE — 88342 IMHCHEM/IMCYTCHM 1ST ANTB: CPT

## 2025-05-06 PROCEDURE — 88342 IMHCHEM/IMCYTCHM 1ST ANTB: CPT | Mod: 26,59

## 2025-05-06 PROCEDURE — 77012 CT SCAN FOR NEEDLE BIOPSY: CPT | Mod: 26

## 2025-05-06 PROCEDURE — 88365 INSITU HYBRIDIZATION (FISH): CPT | Mod: 26

## 2025-05-06 PROCEDURE — 88360 TUMOR IMMUNOHISTOCHEM/MANUAL: CPT | Mod: 26,59

## 2025-05-06 PROCEDURE — 88184 FLOWCYTOMETRY/ TC 1 MARKER: CPT

## 2025-05-06 PROCEDURE — 88341 IMHCHEM/IMCYTCHM EA ADD ANTB: CPT | Mod: 26,59

## 2025-05-06 PROCEDURE — 88307 TISSUE EXAM BY PATHOLOGIST: CPT | Mod: 26

## 2025-05-06 PROCEDURE — 88185 FLOWCYTOMETRY/TC ADD-ON: CPT

## 2025-05-06 PROCEDURE — 82962 GLUCOSE BLOOD TEST: CPT

## 2025-05-06 PROCEDURE — 88189 FLOWCYTOMETRY/READ 16 & >: CPT

## 2025-05-06 PROCEDURE — 77012 CT SCAN FOR NEEDLE BIOPSY: CPT

## 2025-05-06 PROCEDURE — 87205 SMEAR GRAM STAIN: CPT

## 2025-05-06 PROCEDURE — 88341 IMHCHEM/IMCYTCHM EA ADD ANTB: CPT

## 2025-05-06 RX ORDER — AMLODIPINE BESYLATE 10 MG/1
1 TABLET ORAL
Refills: 0 | DISCHARGE

## 2025-05-06 RX ORDER — ORAL SEMAGLUTIDE 14 MG/1
0.5 TABLET ORAL
Refills: 0 | DISCHARGE

## 2025-05-06 RX ORDER — OMEPRAZOLE 20 MG/1
1 CAPSULE, DELAYED RELEASE ORAL
Refills: 0 | DISCHARGE

## 2025-05-06 NOTE — ASU PREOP CHECKLIST - TEMPERATURE IN CELSIUS (DEGREES C)
Pt  Has returned to dance, with the modification to jump half the amount  Pt received Leuko for support  36.8

## 2025-05-06 NOTE — ASU DISCHARGE PLAN (ADULT/PEDIATRIC) - ASU DC SPECIAL INSTRUCTIONSFT
retroperitoneal lymph node biopsy    Discharge Instructions  - You have had a biopsy of retroperitoneal lymph node.  - You may shower in 24 hours. No soaking or swimming until the site is completely healed.  - Keep the area covered and dry for the next 24 hours.  - Do not perform any heavy lifting for the next few days or until the site is healed.  - You may resume your normal diet.  - You may resume your normal medications however you should wait 48 hours before restarting aspirin, plavix, or blood thinners.  - It is normal to experience some pain over the site for the next few days. You may take apply ice to the area (20 minutes on, 20 minutes off) and take Tylenol for that pain. Do not take more frequently than every 6 hours and do not exceed more than 3000mg of Tylenol in a 24 hour period.    - You were given conscious sedation which may make you drowsy, therefore you need someone to stay with you until the morning following the procedure.  - Do not drive, engage in heavy lifting or strenuous activity, or drink any alcoholic beverages for the next 24 hours.   - You may resume normal activity in 24 hours.    Notify your primary physician and/or Interventional Radiology IMMEDIATELY if you experience any of the following       - Fever of 100.4F or 38C       - Chills or Rigors/ Shakes       - Swelling and/or Redness in the area around the biopsy site       - Worsening Pain       - Blood soaked bandages or worsening bleeding       - Lightheadedness and/or dizziness upon standing       - Chest Pain/ Tightness       - Shortness of Breath       - Difficulty walking    If you have a problem that you believe requires IMMEDIATE attention, please go to your NEAREST Emergency Room. If you believe your problem can safely wait until you speak to a physician, please call Interventional Radiology for any concerns.    During Normal Weekday Business Hours- You can contact the Interventional Radiology department during normal business hours via telephone.  During Evenings and Weekends- If you need to contact Interventional Radiology during off hours, do so by calling the hospital and requesting to be connected to the Interventional Radiologist on call.

## 2025-05-06 NOTE — PROCEDURE NOTE - LOCAL ANESTHESIA
1% Lidocaine
After evaluating the patient it has been determined they are at risk for postpartum hemorrhage.

## 2025-05-06 NOTE — ASU DISCHARGE PLAN (ADULT/PEDIATRIC) - FINANCIAL ASSISTANCE
[facility] provides services at a reduced cost to those who are determined to be eligible through [facility]’s financial assistance program. Information regarding [facility]’s financial assistance program can be found by going to [email or web address] or by calling [phone].

## 2025-05-06 NOTE — ASU DISCHARGE PLAN (ADULT/PEDIATRIC) - NS MD DC FALL RISK RISK
For information on Fall & Injury Prevention, visit: [email or web address] OR  [email or web address] OR  https://www.cdc.gov/steadi/patient.html

## 2025-05-07 LAB — FLOW CYTOMETRY FINAL REPORT: SIGNIFICANT CHANGE UP

## 2025-05-15 ENCOUNTER — NON-APPOINTMENT (OUTPATIENT)
Age: 55
End: 2025-05-15

## 2025-05-15 DIAGNOSIS — R10.9 UNSPECIFIED ABDOMINAL PAIN: ICD-10-CM

## 2025-05-15 DIAGNOSIS — R11.2 NAUSEA WITH VOMITING, UNSPECIFIED: ICD-10-CM

## 2025-05-15 RX ORDER — OXYCODONE AND ACETAMINOPHEN 10; 325 MG/1; MG/1
10-325 TABLET ORAL
Qty: 12 | Refills: 0 | Status: ACTIVE | COMMUNITY
Start: 2025-05-15 | End: 1900-01-01

## 2025-05-15 RX ORDER — NALOXONE HYDROCHLORIDE NASAL 4 MG/.1ML
4 SPRAY NASAL
Qty: 1 | Refills: 0 | Status: ACTIVE | COMMUNITY
Start: 2025-05-15 | End: 1900-01-01

## 2025-05-15 RX ORDER — ONDANSETRON 4 MG/1
4 TABLET, ORALLY DISINTEGRATING ORAL
Qty: 30 | Refills: 0 | Status: ACTIVE | COMMUNITY
Start: 2025-05-15 | End: 1900-01-01

## 2025-05-16 ENCOUNTER — NON-APPOINTMENT (OUTPATIENT)
Age: 55
End: 2025-05-16

## 2025-05-16 DIAGNOSIS — C83.30 DIFFUSE LARGE B-CELL LYMPHOMA, UNSPECIFIED SITE: ICD-10-CM

## 2025-05-16 LAB — SURGICAL PATHOLOGY STUDY: SIGNIFICANT CHANGE UP

## 2025-05-21 ENCOUNTER — INPATIENT (INPATIENT)
Facility: HOSPITAL | Age: 55
LOS: 0 days | Discharge: ANOTHER HOSPITAL | DRG: 446 | End: 2025-05-22
Attending: STUDENT IN AN ORGANIZED HEALTH CARE EDUCATION/TRAINING PROGRAM | Admitting: HOSPITALIST
Payer: COMMERCIAL

## 2025-05-21 VITALS
TEMPERATURE: 98 F | RESPIRATION RATE: 20 BRPM | HEART RATE: 106 BPM | SYSTOLIC BLOOD PRESSURE: 160 MMHG | WEIGHT: 264.55 LBS | HEIGHT: 66 IN | OXYGEN SATURATION: 98 % | DIASTOLIC BLOOD PRESSURE: 75 MMHG

## 2025-05-21 DIAGNOSIS — Z98.89 OTHER SPECIFIED POSTPROCEDURAL STATES: Chronic | ICD-10-CM

## 2025-05-21 DIAGNOSIS — Z90.49 ACQUIRED ABSENCE OF OTHER SPECIFIED PARTS OF DIGESTIVE TRACT: Chronic | ICD-10-CM

## 2025-05-21 LAB
ALBUMIN SERPL ELPH-MCNC: 2.7 G/DL — LOW (ref 3.3–5.2)
ALP SERPL-CCNC: 2409 U/L — HIGH (ref 40–120)
ALT FLD-CCNC: 165 U/L — HIGH
ANION GAP SERPL CALC-SCNC: 18 MMOL/L — HIGH (ref 5–17)
ANISOCYTOSIS BLD QL: ABNORMAL
APTT BLD: 36.8 SEC — SIGNIFICANT CHANGE UP (ref 26.1–36.8)
AST SERPL-CCNC: 272 U/L — HIGH
BASOPHILS # BLD AUTO: 0.05 K/UL — SIGNIFICANT CHANGE UP (ref 0–0.2)
BASOPHILS NFR BLD AUTO: 0.4 % — SIGNIFICANT CHANGE UP (ref 0–2)
BILIRUB SERPL-MCNC: 10 MG/DL — HIGH (ref 0.4–2)
BUN SERPL-MCNC: 18 MG/DL — SIGNIFICANT CHANGE UP (ref 8–20)
CALCIUM SERPL-MCNC: 10.9 MG/DL — HIGH (ref 8.4–10.5)
CHLORIDE SERPL-SCNC: 88 MMOL/L — LOW (ref 96–108)
CO2 SERPL-SCNC: 25 MMOL/L — SIGNIFICANT CHANGE UP (ref 22–29)
CREAT SERPL-MCNC: 0.62 MG/DL — SIGNIFICANT CHANGE UP (ref 0.5–1.3)
EGFR: 106 ML/MIN/1.73M2 — SIGNIFICANT CHANGE UP
EGFR: 106 ML/MIN/1.73M2 — SIGNIFICANT CHANGE UP
EOSINOPHIL # BLD AUTO: 0.4 K/UL — SIGNIFICANT CHANGE UP (ref 0–0.5)
EOSINOPHIL NFR BLD AUTO: 3.4 % — SIGNIFICANT CHANGE UP (ref 0–6)
GLUCOSE SERPL-MCNC: 111 MG/DL — HIGH (ref 70–99)
HCT VFR BLD CALC: 33.3 % — LOW (ref 34.5–45)
HGB BLD-MCNC: 10.3 G/DL — LOW (ref 11.5–15.5)
HYPOCHROMIA BLD QL: SLIGHT — SIGNIFICANT CHANGE UP
IMM GRANULOCYTES # BLD AUTO: 0.07 K/UL — SIGNIFICANT CHANGE UP (ref 0–0.07)
IMM GRANULOCYTES NFR BLD AUTO: 0.6 % — SIGNIFICANT CHANGE UP (ref 0–0.9)
INR BLD: 1.38 RATIO — HIGH (ref 0.85–1.16)
LIDOCAIN IGE QN: 14 U/L — LOW (ref 22–51)
LYMPHOCYTES # BLD AUTO: 0.55 K/UL — LOW (ref 1–3.3)
LYMPHOCYTES NFR BLD AUTO: 4.7 % — LOW (ref 13–44)
MCHC RBC-ENTMCNC: 21.9 PG — LOW (ref 27–34)
MCHC RBC-ENTMCNC: 30.9 G/DL — LOW (ref 32–36)
MCV RBC AUTO: 70.7 FL — LOW (ref 80–100)
MICROCYTES BLD QL: ABNORMAL
MONOCYTES # BLD AUTO: 0.51 K/UL — SIGNIFICANT CHANGE UP (ref 0–0.9)
MONOCYTES NFR BLD AUTO: 4.4 % — SIGNIFICANT CHANGE UP (ref 2–14)
NEUTROPHILS # BLD AUTO: 10.12 K/UL — HIGH (ref 1.8–7.4)
NEUTROPHILS NFR BLD AUTO: 86.5 % — HIGH (ref 43–77)
NRBC # BLD AUTO: 0 K/UL — SIGNIFICANT CHANGE UP (ref 0–0)
NRBC # FLD: 0 K/UL — SIGNIFICANT CHANGE UP (ref 0–0)
NRBC BLD AUTO-RTO: 0 /100 WBCS — SIGNIFICANT CHANGE UP (ref 0–0)
OVALOCYTES BLD QL SMEAR: SLIGHT — SIGNIFICANT CHANGE UP
PLAT MORPH BLD: NORMAL — SIGNIFICANT CHANGE UP
PLATELET # BLD AUTO: 403 K/UL — HIGH (ref 150–400)
PMV BLD: 9.9 FL — SIGNIFICANT CHANGE UP (ref 7–13)
POIKILOCYTOSIS BLD QL AUTO: ABNORMAL
POLYCHROMASIA BLD QL SMEAR: SLIGHT — SIGNIFICANT CHANGE UP
POTASSIUM SERPL-MCNC: 3.4 MMOL/L — LOW (ref 3.5–5.3)
POTASSIUM SERPL-SCNC: 3.4 MMOL/L — LOW (ref 3.5–5.3)
PROT SERPL-MCNC: 6.2 G/DL — LOW (ref 6.6–8.7)
PROTHROM AB SERPL-ACNC: 16 SEC — HIGH (ref 9.9–13.4)
RBC # BLD: 4.71 M/UL — SIGNIFICANT CHANGE UP (ref 3.8–5.2)
RBC # FLD: 26.8 % — HIGH (ref 10.3–14.5)
RBC BLD AUTO: ABNORMAL
SODIUM SERPL-SCNC: 131 MMOL/L — LOW (ref 135–145)
STOMATOCYTES BLD QL SMEAR: SLIGHT — SIGNIFICANT CHANGE UP
TARGETS BLD QL SMEAR: ABNORMAL
WBC # BLD: 11.7 K/UL — HIGH (ref 3.8–10.5)
WBC # FLD AUTO: 11.7 K/UL — HIGH (ref 3.8–10.5)

## 2025-05-21 PROCEDURE — 99285 EMERGENCY DEPT VISIT HI MDM: CPT

## 2025-05-21 PROCEDURE — 76705 ECHO EXAM OF ABDOMEN: CPT | Mod: 26

## 2025-05-21 PROCEDURE — 93010 ELECTROCARDIOGRAM REPORT: CPT

## 2025-05-21 RX ORDER — ONDANSETRON HCL/PF 4 MG/2 ML
4 VIAL (ML) INJECTION ONCE
Refills: 0 | Status: COMPLETED | OUTPATIENT
Start: 2025-05-21 | End: 2025-05-21

## 2025-05-21 RX ORDER — HYDROMORPHONE/SOD CHLOR,ISO/PF 2 MG/10 ML
1 SYRINGE (ML) INJECTION ONCE
Refills: 0 | Status: DISCONTINUED | OUTPATIENT
Start: 2025-05-21 | End: 2025-05-21

## 2025-05-21 RX ORDER — DIPHENHYDRAMINE HCL 12.5MG/5ML
25 ELIXIR ORAL ONCE
Refills: 0 | Status: COMPLETED | OUTPATIENT
Start: 2025-05-21 | End: 2025-05-21

## 2025-05-21 RX ADMIN — Medication 1000 MILLILITER(S): at 23:04

## 2025-05-21 RX ADMIN — Medication 25 MILLIGRAM(S): at 22:06

## 2025-05-21 RX ADMIN — Medication 3 MILLILITER(S): at 21:18

## 2025-05-21 RX ADMIN — Medication 1 MILLIGRAM(S): at 22:06

## 2025-05-21 RX ADMIN — Medication 4 MILLIGRAM(S): at 22:06

## 2025-05-21 NOTE — ED PROVIDER NOTE - OBJECTIVE STATEMENT
54-year-old female with recent diagnosis of B-cell lymphoma presents to the ED complaining of increasing weakness, jaundice, unable to tolerate p.o. with increased nausea and multiple episodes of vomiting over the last 3 to 4 days.  Patient had last CT abdomen pelvis on 4/28 which demonstrated multiple  thoracic and abdominal  lymph nodes concerning for metastatic disease.  Patient has her first upcoming appointment at [facility] with Dr. Justice next week and has PET scan scheduled for this Friday as well as placement of Chemo-Port and MUGA scan.  Patient admits to 2 episodes of diarrhea earlier this week with chills.  Patient noticed that her urine is dark in color as well as scleral icterus and jaundice of her skin.  Patient complaining of generalized abdominal pain

## 2025-05-21 NOTE — ED PROVIDER NOTE - NSICDXPASTMEDICALHX_GEN_ALL_CORE_FT
PAST MEDICAL HISTORY:  BERTRAM (acute kidney injury)     Asthma     Diabetes     GERD (gastroesophageal reflux disease)     H/O: depression     History of lymphadenopathy     HTN (hypertension)     Obesity

## 2025-05-21 NOTE — ED PROVIDER NOTE - CLINICAL SUMMARY MEDICAL DECISION MAKING FREE TEXT BOX
54-year-old female with recent diagnosis of B-cell lymphoma presents to the ED complaining of increasing weakness, jaundice, unable to tolerate p.o. with increased nausea and multiple episodes of vomiting over the last 3 to 4 days.  Patient had last CT abdomen pelvis on 4/28 which demonstrated multiple  thoracic and abdominal  lymph nodes concerning for metastatic disease.  Patient has her first upcoming appointment at [facility] with Dr. Justice next week and has PET scan scheduled for this Friday as well as placement of Chemo-Port and MUGA scan.  Patient admits to 2 episodes of diarrhea earlier this week with chills.  Patient noticed that her urine is dark in color as well as scleral icterus and jaundice of her skin.  Patient complaining of generalized abdominal pain.   we will check labs medicate for pain and nausea.  Patient will likely require admission and require abdominal MRI

## 2025-05-21 NOTE — ED ADULT TRIAGE NOTE - CHIEF COMPLAINT QUOTE
Pt presents to ED c/o dizziness, weakness, abd pain, n/v x1 week. Pt appears jaundice to skin and sclera in triage, states it has started 1 week ago and gotten worse. Pt has not been able to tolerate PO meds due to vomiting. Pt recently diagnosed with diffused large b cell lymphoma. Pt has been taking oxycodone and muscle relaxer, prescribed by oncologist Dr Sheldon with no relief. Pt unable to ambulate in triage due to weakness.

## 2025-05-21 NOTE — ED PROVIDER NOTE - PROGRESS NOTE DETAILS
Dre: Pt received in signout from Dr. Dalton. CT reviewed and discussed with pt. Consults placed to GI and heme/onc. Admitted to medicine.

## 2025-05-21 NOTE — ED ADULT NURSE NOTE - OBJECTIVE STATEMENT
Pt is AOX4 presents to ED complaining of weakness, generalized pain, "yellow skin". Pt also reports being unable to keep down PO intake, 3 episodes of vomiting. Pt was recently dx with B-cell lymphoma. Pt on CM and . NAD. RR even and unlabored. Pt updated on the plan of care and verbalizes understanding.

## 2025-05-21 NOTE — ED PROVIDER NOTE - EYES [+], MLM
Tiesha Miller presents today because her incision began draining once again  The most superior aspect of her midline incision intermittently drains  She has had incisional hernias repaired in the past with mesh  This is all contributory to this problem  Its been explored and some suture granulomata, occasional metal tack and some mesh have been removed  It has healed in the past but always seems to recur  And manifest itself as swelling, pain, and drainage  The drainage is serosanguineous in nature  Physical exam: Middle-aged obese white female awake alert no distress    Abdomen multiple scars are noted  Obese  The superior aspect of the incision is somewhat puckered in with some serosanguineous drainage noted  No padmini pus  The wound is cleaned gently as it is quite sensitive with Q-tip and some peroxide  DSD is placed  Impression: Recurrence of draining sinus related to underlying prosthetic mesh  Plan: Antibiotics, redressed, local care, analgesia  She has recently moved and bought a new house around [place]  Office visits are therefore significantly farther than they used to be  Of course we always like to see her but we will keep them at a minimum 
scleral icterus

## 2025-05-22 ENCOUNTER — TRANSCRIPTION ENCOUNTER (OUTPATIENT)
Age: 55
End: 2025-05-22

## 2025-05-22 ENCOUNTER — INPATIENT (INPATIENT)
Facility: HOSPITAL | Age: 55
LOS: 4 days | Discharge: HOME | DRG: 842 | End: 2025-05-27
Attending: INTERNAL MEDICINE | Admitting: INTERNAL MEDICINE
Payer: MEDICAID

## 2025-05-22 VITALS
DIASTOLIC BLOOD PRESSURE: 77 MMHG | OXYGEN SATURATION: 97 % | TEMPERATURE: 99 F | SYSTOLIC BLOOD PRESSURE: 145 MMHG | RESPIRATION RATE: 18 BRPM | HEART RATE: 99 BPM

## 2025-05-22 VITALS
RESPIRATION RATE: 18 BRPM | SYSTOLIC BLOOD PRESSURE: 144 MMHG | OXYGEN SATURATION: 95 % | HEIGHT: 65.35 IN | DIASTOLIC BLOOD PRESSURE: 80 MMHG | TEMPERATURE: 97 F | WEIGHT: 268.08 LBS | HEART RATE: 96 BPM

## 2025-05-22 DIAGNOSIS — C83.30 DIFFUSE LARGE B-CELL LYMPHOMA, UNSPECIFIED SITE: ICD-10-CM

## 2025-05-22 DIAGNOSIS — I10 ESSENTIAL (PRIMARY) HYPERTENSION: ICD-10-CM

## 2025-05-22 DIAGNOSIS — E11.9 TYPE 2 DIABETES MELLITUS WITHOUT COMPLICATIONS: ICD-10-CM

## 2025-05-22 DIAGNOSIS — K81.1 CHRONIC CHOLECYSTITIS: ICD-10-CM

## 2025-05-22 DIAGNOSIS — Z98.89 OTHER SPECIFIED POSTPROCEDURAL STATES: Chronic | ICD-10-CM

## 2025-05-22 DIAGNOSIS — Z90.49 ACQUIRED ABSENCE OF OTHER SPECIFIED PARTS OF DIGESTIVE TRACT: Chronic | ICD-10-CM

## 2025-05-22 DIAGNOSIS — C82.90 FOLLICULAR LYMPHOMA, UNSPECIFIED, UNSPECIFIED SITE: ICD-10-CM

## 2025-05-22 DIAGNOSIS — Z29.9 ENCOUNTER FOR PROPHYLACTIC MEASURES, UNSPECIFIED: ICD-10-CM

## 2025-05-22 DIAGNOSIS — K83.1 OBSTRUCTION OF BILE DUCT: ICD-10-CM

## 2025-05-22 LAB
24R-OH-CALCIDIOL SERPL-MCNC: 8.5 NG/ML — SIGNIFICANT CHANGE UP
A1C WITH ESTIMATED AVERAGE GLUCOSE RESULT: 6.1 % — HIGH (ref 4–5.6)
ALBUMIN SERPL ELPH-MCNC: 2.6 G/DL — LOW (ref 3.3–5.2)
ALP SERPL-CCNC: 2324 U/L — HIGH (ref 40–120)
ALT FLD-CCNC: 164 U/L — HIGH
AMORPH CRY # UR COMP ASSIST: PRESENT
ANION GAP SERPL CALC-SCNC: 14 MMOL/L — SIGNIFICANT CHANGE UP (ref 5–17)
APPEARANCE UR: ABNORMAL
AST SERPL-CCNC: 299 U/L — HIGH
BACTERIA # UR AUTO: ABNORMAL /HPF
BILIRUB DIRECT SERPL-MCNC: 8.5 MG/DL — HIGH (ref 0–0.3)
BILIRUB DIRECT SERPL-MCNC: 8.5 MG/DL — HIGH (ref 0–0.3)
BILIRUB INDIRECT FLD-MCNC: 1.1 MG/DL — HIGH (ref 0.2–1)
BILIRUB SERPL-MCNC: 9.6 MG/DL — HIGH (ref 0.4–2)
BILIRUB SERPL-MCNC: 9.6 MG/DL — HIGH (ref 0.4–2)
BILIRUB UR-MCNC: ABNORMAL
BUN SERPL-MCNC: 17.2 MG/DL — SIGNIFICANT CHANGE UP (ref 8–20)
CA-I BLD-SCNC: 1.44 MMOL/L — HIGH (ref 1.15–1.33)
CALCIUM SERPL-MCNC: 10.8 MG/DL — HIGH (ref 8.4–10.5)
CALCIUM SERPL-MCNC: 10.8 MG/DL — HIGH (ref 8.4–10.5)
CALCIUM UR-MCNC: 39.2 MG/DL — SIGNIFICANT CHANGE UP
CAST: 1 /LPF — SIGNIFICANT CHANGE UP (ref 0–4)
CHLORIDE SERPL-SCNC: 91 MMOL/L — LOW (ref 96–108)
CHOLEST SERPL-MCNC: 655 MG/DL — HIGH
CO2 SERPL-SCNC: 25 MMOL/L — SIGNIFICANT CHANGE UP (ref 22–29)
COLOR SPEC: SIGNIFICANT CHANGE UP
COMMENT - URINE 2: SIGNIFICANT CHANGE UP
CREAT SERPL-MCNC: 0.68 MG/DL — SIGNIFICANT CHANGE UP (ref 0.5–1.3)
DIFF PNL FLD: NEGATIVE — SIGNIFICANT CHANGE UP
EGFR: 103 ML/MIN/1.73M2 — SIGNIFICANT CHANGE UP
EGFR: 103 ML/MIN/1.73M2 — SIGNIFICANT CHANGE UP
ESTIMATED AVERAGE GLUCOSE: 128 MG/DL — HIGH (ref 68–114)
FINE GRAN CASTS #/AREA URNS AUTO: PRESENT
GLUCOSE BLDC GLUCOMTR-MCNC: 100 MG/DL — HIGH (ref 70–99)
GLUCOSE BLDC GLUCOMTR-MCNC: 102 MG/DL — HIGH (ref 70–99)
GLUCOSE BLDC GLUCOMTR-MCNC: 105 MG/DL — HIGH (ref 70–99)
GLUCOSE BLDC GLUCOMTR-MCNC: 93 MG/DL — SIGNIFICANT CHANGE UP (ref 70–99)
GLUCOSE BLDC GLUCOMTR-MCNC: 97 MG/DL — SIGNIFICANT CHANGE UP (ref 70–99)
GLUCOSE SERPL-MCNC: 91 MG/DL — SIGNIFICANT CHANGE UP (ref 70–99)
GLUCOSE UR QL: NEGATIVE MG/DL — SIGNIFICANT CHANGE UP
HAV IGM SER-ACNC: SIGNIFICANT CHANGE UP
HBV CORE IGM SER-ACNC: SIGNIFICANT CHANGE UP
HBV SURFACE AG SER-ACNC: SIGNIFICANT CHANGE UP
HCG SERPL-ACNC: <4 MIU/ML — SIGNIFICANT CHANGE UP
HCG SERPL-ACNC: <4 MIU/ML — SIGNIFICANT CHANGE UP
HCT VFR BLD CALC: 32.2 % — LOW (ref 34.5–45)
HCV AB S/CO SERPL IA: 0.1 S/CO — SIGNIFICANT CHANGE UP (ref 0–0.79)
HCV AB SERPL-IMP: SIGNIFICANT CHANGE UP
HDLC SERPL-MCNC: 8 MG/DL — LOW
HGB BLD-MCNC: 9.8 G/DL — LOW (ref 11.5–15.5)
KETONES UR QL: ABNORMAL MG/DL
LDH SERPL L TO P-CCNC: 260 U/L — HIGH (ref 98–192)
LDLC SERPL-MCNC: 530 MG/DL — HIGH
LEUKOCYTE ESTERASE UR-ACNC: ABNORMAL
LIPID PNL WITH DIRECT LDL SERPL: 530 MG/DL — HIGH
MAGNESIUM SERPL-MCNC: 1.8 MG/DL — SIGNIFICANT CHANGE UP (ref 1.6–2.6)
MCHC RBC-ENTMCNC: 21.8 PG — LOW (ref 27–34)
MCHC RBC-ENTMCNC: 30.4 G/DL — LOW (ref 32–36)
MCV RBC AUTO: 71.6 FL — LOW (ref 80–100)
NITRITE UR-MCNC: POSITIVE
NONHDLC SERPL-MCNC: 647 MG/DL — HIGH
NRBC # BLD AUTO: 0 K/UL — SIGNIFICANT CHANGE UP (ref 0–0)
NRBC # FLD: 0 K/UL — SIGNIFICANT CHANGE UP (ref 0–0)
NRBC BLD AUTO-RTO: 0 /100 WBCS — SIGNIFICANT CHANGE UP (ref 0–0)
PH UR: 6 — SIGNIFICANT CHANGE UP (ref 5–8)
PHOSPHATE SERPL-MCNC: 3.4 MG/DL — SIGNIFICANT CHANGE UP (ref 2.4–4.7)
PLATELET # BLD AUTO: 397 K/UL — SIGNIFICANT CHANGE UP (ref 150–400)
PMV BLD: 10 FL — SIGNIFICANT CHANGE UP (ref 7–13)
POTASSIUM SERPL-MCNC: 3.4 MMOL/L — LOW (ref 3.5–5.3)
POTASSIUM SERPL-SCNC: 3.4 MMOL/L — LOW (ref 3.5–5.3)
PROT SERPL-MCNC: 5.8 G/DL — LOW (ref 6.6–8.7)
PROT UR-MCNC: 30 MG/DL
PTH-INTACT FLD-MCNC: 3 PG/ML — LOW (ref 15–65)
RBC # BLD: 4.5 M/UL — SIGNIFICANT CHANGE UP (ref 3.8–5.2)
RBC # FLD: 27 % — HIGH (ref 10.3–14.5)
RBC CASTS # UR COMP ASSIST: 2 /HPF — SIGNIFICANT CHANGE UP (ref 0–4)
SODIUM SERPL-SCNC: 130 MMOL/L — LOW (ref 135–145)
SP GR SPEC: 1.03 — SIGNIFICANT CHANGE UP (ref 1–1.03)
SQUAMOUS # UR AUTO: 3 /HPF — SIGNIFICANT CHANGE UP (ref 0–5)
TRIGL SERPL-MCNC: 360 MG/DL — HIGH
URATE SERPL-MCNC: 5.1 MG/DL — SIGNIFICANT CHANGE UP (ref 2.4–5.7)
UROBILINOGEN FLD QL: 1 MG/DL — SIGNIFICANT CHANGE UP (ref 0.2–1)
VIT D25+D1,25 OH+D1,25 PNL SERPL-MCNC: 226.8 PG/ML — HIGH (ref 19.9–79.3)
WBC # BLD: 11.13 K/UL — HIGH (ref 3.8–10.5)
WBC # FLD AUTO: 11.13 K/UL — HIGH (ref 3.8–10.5)
WBC UR QL: 3 /HPF — SIGNIFICANT CHANGE UP (ref 0–5)

## 2025-05-22 PROCEDURE — 74018 RADEX ABDOMEN 1 VIEW: CPT

## 2025-05-22 PROCEDURE — 82247 BILIRUBIN TOTAL: CPT

## 2025-05-22 PROCEDURE — 80074 ACUTE HEPATITIS PANEL: CPT

## 2025-05-22 PROCEDURE — 82310 ASSAY OF CALCIUM: CPT

## 2025-05-22 PROCEDURE — 93005 ELECTROCARDIOGRAM TRACING: CPT

## 2025-05-22 PROCEDURE — 83690 ASSAY OF LIPASE: CPT

## 2025-05-22 PROCEDURE — 93010 ELECTROCARDIOGRAM REPORT: CPT

## 2025-05-22 PROCEDURE — 82248 BILIRUBIN DIRECT: CPT

## 2025-05-22 PROCEDURE — 74018 RADEX ABDOMEN 1 VIEW: CPT | Mod: 26

## 2025-05-22 PROCEDURE — 74177 CT ABD & PELVIS W/CONTRAST: CPT | Mod: 26

## 2025-05-22 PROCEDURE — 80053 COMPREHEN METABOLIC PANEL: CPT

## 2025-05-22 PROCEDURE — 85610 PROTHROMBIN TIME: CPT

## 2025-05-22 PROCEDURE — 83036 HEMOGLOBIN GLYCOSYLATED A1C: CPT

## 2025-05-22 PROCEDURE — 86706 HEP B SURFACE ANTIBODY: CPT

## 2025-05-22 PROCEDURE — 99223 1ST HOSP IP/OBS HIGH 75: CPT

## 2025-05-22 PROCEDURE — 83970 ASSAY OF PARATHORMONE: CPT

## 2025-05-22 PROCEDURE — 82340 ASSAY OF CALCIUM IN URINE: CPT

## 2025-05-22 PROCEDURE — 85027 COMPLETE CBC AUTOMATED: CPT

## 2025-05-22 PROCEDURE — 96375 TX/PRO/DX INJ NEW DRUG ADDON: CPT

## 2025-05-22 PROCEDURE — 82652 VIT D 1 25-DIHYDROXY: CPT

## 2025-05-22 PROCEDURE — 76705 ECHO EXAM OF ABDOMEN: CPT

## 2025-05-22 PROCEDURE — 81001 URINALYSIS AUTO W/SCOPE: CPT

## 2025-05-22 PROCEDURE — 80061 LIPID PANEL: CPT

## 2025-05-22 PROCEDURE — 82306 VITAMIN D 25 HYDROXY: CPT

## 2025-05-22 PROCEDURE — 99222 1ST HOSP IP/OBS MODERATE 55: CPT

## 2025-05-22 PROCEDURE — 85025 COMPLETE CBC W/AUTO DIFF WBC: CPT

## 2025-05-22 PROCEDURE — 86705 HEP B CORE ANTIBODY IGM: CPT

## 2025-05-22 PROCEDURE — 85730 THROMBOPLASTIN TIME PARTIAL: CPT

## 2025-05-22 PROCEDURE — 82330 ASSAY OF CALCIUM: CPT

## 2025-05-22 PROCEDURE — 83615 LACTATE (LD) (LDH) ENZYME: CPT

## 2025-05-22 PROCEDURE — 87086 URINE CULTURE/COLONY COUNT: CPT

## 2025-05-22 PROCEDURE — 84702 CHORIONIC GONADOTROPIN TEST: CPT

## 2025-05-22 PROCEDURE — 99285 EMERGENCY DEPT VISIT HI MDM: CPT | Mod: 25

## 2025-05-22 PROCEDURE — 83735 ASSAY OF MAGNESIUM: CPT

## 2025-05-22 PROCEDURE — 84100 ASSAY OF PHOSPHORUS: CPT

## 2025-05-22 PROCEDURE — 96374 THER/PROPH/DIAG INJ IV PUSH: CPT

## 2025-05-22 PROCEDURE — 84550 ASSAY OF BLOOD/URIC ACID: CPT

## 2025-05-22 PROCEDURE — 74177 CT ABD & PELVIS W/CONTRAST: CPT

## 2025-05-22 PROCEDURE — 36415 COLL VENOUS BLD VENIPUNCTURE: CPT

## 2025-05-22 PROCEDURE — 82962 GLUCOSE BLOOD TEST: CPT

## 2025-05-22 RX ORDER — SENNA 187 MG
2 TABLET ORAL AT BEDTIME
Refills: 0 | Status: DISCONTINUED | OUTPATIENT
Start: 2025-05-22 | End: 2025-05-22

## 2025-05-22 RX ORDER — INSULIN LISPRO 100 U/ML
1 INJECTION, SOLUTION INTRAVENOUS; SUBCUTANEOUS
Qty: 0 | Refills: 0 | DISCHARGE
Start: 2025-05-22

## 2025-05-22 RX ORDER — NALOXONE HYDROCHLORIDE 0.4 MG/ML
0.4 INJECTION, SOLUTION INTRAMUSCULAR; INTRAVENOUS; SUBCUTANEOUS ONCE
Refills: 0 | Status: DISCONTINUED | OUTPATIENT
Start: 2025-05-22 | End: 2025-05-22

## 2025-05-22 RX ORDER — DIPHENHYDRAMINE HCL 12.5MG/5ML
25 ELIXIR ORAL ONCE
Refills: 0 | Status: COMPLETED | OUTPATIENT
Start: 2025-05-22 | End: 2025-05-22

## 2025-05-22 RX ORDER — INSULIN LISPRO 100 U/ML
INJECTION, SOLUTION INTRAVENOUS; SUBCUTANEOUS AT BEDTIME
Refills: 0 | Status: DISCONTINUED | OUTPATIENT
Start: 2025-05-22 | End: 2025-05-27

## 2025-05-22 RX ORDER — CEFTRIAXONE 500 MG/1
1000 INJECTION, POWDER, FOR SOLUTION INTRAMUSCULAR; INTRAVENOUS ONCE
Refills: 0 | Status: COMPLETED | OUTPATIENT
Start: 2025-05-22 | End: 2025-05-22

## 2025-05-22 RX ORDER — DEXTROSE 50 % IN WATER 50 %
15 SYRINGE (ML) INTRAVENOUS ONCE
Refills: 0 | Status: DISCONTINUED | OUTPATIENT
Start: 2025-05-22 | End: 2025-05-27

## 2025-05-22 RX ORDER — SODIUM CHLORIDE 9 G/1000ML
1000 INJECTION, SOLUTION INTRAVENOUS
Refills: 0 | Status: DISCONTINUED | OUTPATIENT
Start: 2025-05-22 | End: 2025-05-27

## 2025-05-22 RX ORDER — HYDROMORPHONE/SOD CHLOR,ISO/PF 2 MG/10 ML
2 SYRINGE (ML) INJECTION EVERY 6 HOURS
Refills: 0 | Status: DISCONTINUED | OUTPATIENT
Start: 2025-05-22 | End: 2025-05-22

## 2025-05-22 RX ORDER — HYDROMORPHONE/SOD CHLOR,ISO/PF 2 MG/10 ML
1 SYRINGE (ML) INJECTION
Qty: 0 | Refills: 0 | DISCHARGE
Start: 2025-05-22

## 2025-05-22 RX ORDER — INSULIN LISPRO 100 U/ML
INJECTION, SOLUTION INTRAVENOUS; SUBCUTANEOUS
Refills: 0 | Status: DISCONTINUED | OUTPATIENT
Start: 2025-05-22 | End: 2025-05-27

## 2025-05-22 RX ORDER — SENNA 187 MG
2 TABLET ORAL
Qty: 0 | Refills: 0 | DISCHARGE
Start: 2025-05-22

## 2025-05-22 RX ORDER — HEPARIN SODIUM 1000 [USP'U]/ML
5000 INJECTION INTRAVENOUS; SUBCUTANEOUS EVERY 8 HOURS
Refills: 0 | Status: DISCONTINUED | OUTPATIENT
Start: 2025-05-22 | End: 2025-05-27

## 2025-05-22 RX ORDER — BISACODYL 5 MG
1 TABLET, DELAYED RELEASE (ENTERIC COATED) ORAL
Qty: 0 | Refills: 0 | DISCHARGE
Start: 2025-05-22

## 2025-05-22 RX ORDER — HYDROMORPHONE/SOD CHLOR,ISO/PF 2 MG/10 ML
1 SYRINGE (ML) INJECTION ONCE
Refills: 0 | Status: DISCONTINUED | OUTPATIENT
Start: 2025-05-22 | End: 2025-05-22

## 2025-05-22 RX ORDER — INSULIN LISPRO 100 U/ML
INJECTION, SOLUTION INTRAVENOUS; SUBCUTANEOUS
Refills: 0 | Status: DISCONTINUED | OUTPATIENT
Start: 2025-05-22 | End: 2025-05-22

## 2025-05-22 RX ORDER — ACETAMINOPHEN 500 MG/5ML
3 LIQUID (ML) ORAL
Qty: 0 | Refills: 0 | DISCHARGE
Start: 2025-05-22

## 2025-05-22 RX ORDER — ERTAPENEM SODIUM 1 G/1
1000 INJECTION, POWDER, LYOPHILIZED, FOR SOLUTION INTRAMUSCULAR; INTRAVENOUS ONCE
Refills: 0 | Status: DISCONTINUED | OUTPATIENT
Start: 2025-05-22 | End: 2025-05-22

## 2025-05-22 RX ORDER — DIPHENHYDRAMINE HCL 12.5MG/5ML
25 ELIXIR ORAL EVERY 6 HOURS
Refills: 0 | Status: DISCONTINUED | OUTPATIENT
Start: 2025-05-22 | End: 2025-05-22

## 2025-05-22 RX ORDER — INDOMETHACIN 50 MG
100 CAPSULE ORAL ONCE
Refills: 0 | Status: DISCONTINUED | OUTPATIENT
Start: 2025-05-22 | End: 2025-05-22

## 2025-05-22 RX ORDER — HYDROMORPHONE/SOD CHLOR,ISO/PF 2 MG/10 ML
0.5 SYRINGE (ML) INJECTION
Qty: 0 | Refills: 0 | DISCHARGE
Start: 2025-05-22

## 2025-05-22 RX ORDER — MELATONIN 5 MG
3 TABLET ORAL AT BEDTIME
Refills: 0 | Status: DISCONTINUED | OUTPATIENT
Start: 2025-05-22 | End: 2025-05-22

## 2025-05-22 RX ORDER — HYDROMORPHONE/SOD CHLOR,ISO/PF 2 MG/10 ML
0.2 SYRINGE (ML) INJECTION EVERY 4 HOURS
Refills: 0 | Status: DISCONTINUED | OUTPATIENT
Start: 2025-05-22 | End: 2025-05-22

## 2025-05-22 RX ORDER — DEXTROSE 50 % IN WATER 50 %
12.5 SYRINGE (ML) INTRAVENOUS ONCE
Refills: 0 | Status: DISCONTINUED | OUTPATIENT
Start: 2025-05-22 | End: 2025-05-27

## 2025-05-22 RX ORDER — SERTRALINE 100 MG/1
50 TABLET, FILM COATED ORAL DAILY
Refills: 0 | Status: DISCONTINUED | OUTPATIENT
Start: 2025-05-22 | End: 2025-05-27

## 2025-05-22 RX ORDER — ACETAMINOPHEN 500 MG/5ML
975 LIQUID (ML) ORAL EVERY 8 HOURS
Refills: 0 | Status: DISCONTINUED | OUTPATIENT
Start: 2025-05-22 | End: 2025-05-22

## 2025-05-22 RX ORDER — INSULIN GLARGINE-YFGN 100 [IU]/ML
10 INJECTION, SOLUTION SUBCUTANEOUS AT BEDTIME
Refills: 0 | Status: DISCONTINUED | OUTPATIENT
Start: 2025-05-22 | End: 2025-05-27

## 2025-05-22 RX ORDER — SERTRALINE 100 MG/1
1 TABLET, FILM COATED ORAL
Qty: 0 | Refills: 0 | DISCHARGE
Start: 2025-05-22

## 2025-05-22 RX ORDER — HYDROMORPHONE/SOD CHLOR,ISO/PF 2 MG/10 ML
0.5 SYRINGE (ML) INJECTION EVERY 4 HOURS
Refills: 0 | Status: DISCONTINUED | OUTPATIENT
Start: 2025-05-22 | End: 2025-05-22

## 2025-05-22 RX ORDER — ONDANSETRON HCL/PF 4 MG/2 ML
4 VIAL (ML) INJECTION EVERY 8 HOURS
Refills: 0 | Status: DISCONTINUED | OUTPATIENT
Start: 2025-05-22 | End: 2025-05-22

## 2025-05-22 RX ORDER — METHOCARBAMOL 500 MG/1
500 TABLET, FILM COATED ORAL
Refills: 0 | Status: DISCONTINUED | OUTPATIENT
Start: 2025-05-22 | End: 2025-05-22

## 2025-05-22 RX ORDER — GLUCAGON 3 MG/1
1 POWDER NASAL ONCE
Refills: 0 | Status: DISCONTINUED | OUTPATIENT
Start: 2025-05-22 | End: 2025-05-22

## 2025-05-22 RX ORDER — GLUCAGON 3 MG/1
1 POWDER NASAL ONCE
Refills: 0 | Status: DISCONTINUED | OUTPATIENT
Start: 2025-05-22 | End: 2025-05-27

## 2025-05-22 RX ORDER — MELATONIN 5 MG
3 TABLET ORAL AT BEDTIME
Refills: 0 | Status: DISCONTINUED | OUTPATIENT
Start: 2025-05-22 | End: 2025-05-27

## 2025-05-22 RX ORDER — ACETAMINOPHEN 500 MG/5ML
650 LIQUID (ML) ORAL EVERY 6 HOURS
Refills: 0 | Status: DISCONTINUED | OUTPATIENT
Start: 2025-05-22 | End: 2025-05-27

## 2025-05-22 RX ORDER — POLYETHYLENE GLYCOL 3350 17 G/17G
17 POWDER, FOR SOLUTION ORAL
Qty: 0 | Refills: 0 | DISCHARGE
Start: 2025-05-22

## 2025-05-22 RX ORDER — MAGNESIUM, ALUMINUM HYDROXIDE 200-200 MG
30 TABLET,CHEWABLE ORAL EVERY 4 HOURS
Refills: 0 | Status: DISCONTINUED | OUTPATIENT
Start: 2025-05-22 | End: 2025-05-27

## 2025-05-22 RX ORDER — INSULIN GLARGINE-YFGN 100 [IU]/ML
10 INJECTION, SOLUTION SUBCUTANEOUS AT BEDTIME
Refills: 0 | Status: DISCONTINUED | OUTPATIENT
Start: 2025-05-22 | End: 2025-05-22

## 2025-05-22 RX ORDER — ALBUTEROL SULFATE 2.5 MG/3ML
2 VIAL, NEBULIZER (ML) INHALATION EVERY 6 HOURS
Refills: 0 | Status: DISCONTINUED | OUTPATIENT
Start: 2025-05-22 | End: 2025-05-22

## 2025-05-22 RX ORDER — ONDANSETRON HCL/PF 4 MG/2 ML
4 VIAL (ML) INJECTION ONCE
Refills: 0 | Status: COMPLETED | OUTPATIENT
Start: 2025-05-22 | End: 2025-05-22

## 2025-05-22 RX ORDER — POLYETHYLENE GLYCOL 3350 17 G/17G
17 POWDER, FOR SOLUTION ORAL DAILY
Refills: 0 | Status: DISCONTINUED | OUTPATIENT
Start: 2025-05-22 | End: 2025-05-27

## 2025-05-22 RX ORDER — INSULIN GLARGINE-YFGN 100 [IU]/ML
10 INJECTION, SOLUTION SUBCUTANEOUS ONCE
Refills: 0 | Status: COMPLETED | OUTPATIENT
Start: 2025-05-22 | End: 2025-05-22

## 2025-05-22 RX ORDER — INDOMETHACIN 50 MG
2 CAPSULE ORAL
Qty: 0 | Refills: 0 | DISCHARGE
Start: 2025-05-22

## 2025-05-22 RX ORDER — ACETAMINOPHEN 500 MG/5ML
650 LIQUID (ML) ORAL EVERY 6 HOURS
Refills: 0 | Status: DISCONTINUED | OUTPATIENT
Start: 2025-05-22 | End: 2025-05-22

## 2025-05-22 RX ORDER — ONDANSETRON HCL/PF 4 MG/2 ML
4 VIAL (ML) INJECTION EVERY 8 HOURS
Refills: 0 | Status: DISCONTINUED | OUTPATIENT
Start: 2025-05-22 | End: 2025-05-25

## 2025-05-22 RX ORDER — PREDNISONE 20 MG/1
100 TABLET ORAL EVERY 24 HOURS
Refills: 0 | Status: COMPLETED | OUTPATIENT
Start: 2025-05-22 | End: 2025-05-27

## 2025-05-22 RX ORDER — DEXTROSE 50 % IN WATER 50 %
15 SYRINGE (ML) INTRAVENOUS ONCE
Refills: 0 | Status: DISCONTINUED | OUTPATIENT
Start: 2025-05-22 | End: 2025-05-22

## 2025-05-22 RX ORDER — SERTRALINE 100 MG/1
50 TABLET, FILM COATED ORAL DAILY
Refills: 0 | Status: DISCONTINUED | OUTPATIENT
Start: 2025-05-22 | End: 2025-05-22

## 2025-05-22 RX ORDER — DEXTROSE 50 % IN WATER 50 %
25 SYRINGE (ML) INTRAVENOUS ONCE
Refills: 0 | Status: DISCONTINUED | OUTPATIENT
Start: 2025-05-22 | End: 2025-05-22

## 2025-05-22 RX ORDER — POLYETHYLENE GLYCOL 3350 17 G/17G
17 POWDER, FOR SOLUTION ORAL DAILY
Refills: 0 | Status: DISCONTINUED | OUTPATIENT
Start: 2025-05-22 | End: 2025-05-22

## 2025-05-22 RX ORDER — AMLODIPINE BESYLATE 10 MG/1
10 TABLET ORAL DAILY
Refills: 0 | Status: DISCONTINUED | OUTPATIENT
Start: 2025-05-22 | End: 2025-05-22

## 2025-05-22 RX ORDER — METHOCARBAMOL 500 MG/1
1 TABLET, FILM COATED ORAL
Qty: 0 | Refills: 0 | DISCHARGE
Start: 2025-05-22

## 2025-05-22 RX ORDER — SODIUM CHLORIDE 9 G/1000ML
1000 INJECTION, SOLUTION INTRAVENOUS
Refills: 0 | Status: DISCONTINUED | OUTPATIENT
Start: 2025-05-22 | End: 2025-05-22

## 2025-05-22 RX ORDER — DEXTROSE 50 % IN WATER 50 %
25 SYRINGE (ML) INTRAVENOUS ONCE
Refills: 0 | Status: DISCONTINUED | OUTPATIENT
Start: 2025-05-22 | End: 2025-05-27

## 2025-05-22 RX ORDER — MAGNESIUM, ALUMINUM HYDROXIDE 200-200 MG
30 TABLET,CHEWABLE ORAL EVERY 4 HOURS
Refills: 0 | Status: DISCONTINUED | OUTPATIENT
Start: 2025-05-22 | End: 2025-05-22

## 2025-05-22 RX ORDER — SENNA 187 MG
2 TABLET ORAL AT BEDTIME
Refills: 0 | Status: DISCONTINUED | OUTPATIENT
Start: 2025-05-22 | End: 2025-05-27

## 2025-05-22 RX ORDER — HYDROMORPHONE/SOD CHLOR,ISO/PF 2 MG/10 ML
0.5 SYRINGE (ML) INJECTION ONCE
Refills: 0 | Status: DISCONTINUED | OUTPATIENT
Start: 2025-05-22 | End: 2025-05-22

## 2025-05-22 RX ORDER — INSULIN GLARGINE-YFGN 100 [IU]/ML
10 INJECTION, SOLUTION SUBCUTANEOUS
Qty: 0 | Refills: 0 | DISCHARGE
Start: 2025-05-22

## 2025-05-22 RX ORDER — BISACODYL 5 MG
5 TABLET, DELAYED RELEASE (ENTERIC COATED) ORAL DAILY
Refills: 0 | Status: DISCONTINUED | OUTPATIENT
Start: 2025-05-22 | End: 2025-05-22

## 2025-05-22 RX ADMIN — Medication 0.2 MILLIGRAM(S): at 14:13

## 2025-05-22 RX ADMIN — Medication 4 MILLIGRAM(S): at 21:04

## 2025-05-22 RX ADMIN — AMLODIPINE BESYLATE 10 MILLIGRAM(S): 10 TABLET ORAL at 05:43

## 2025-05-22 RX ADMIN — CEFTRIAXONE 1000 MILLIGRAM(S): 500 INJECTION, POWDER, FOR SOLUTION INTRAMUSCULAR; INTRAVENOUS at 03:01

## 2025-05-22 RX ADMIN — Medication 0.2 MILLIGRAM(S): at 14:30

## 2025-05-22 RX ADMIN — Medication 0.5 MILLIGRAM(S): at 15:30

## 2025-05-22 RX ADMIN — Medication 75 MILLILITER(S): at 10:35

## 2025-05-22 RX ADMIN — Medication 4 MILLIGRAM(S): at 11:32

## 2025-05-22 RX ADMIN — Medication 25 MILLIGRAM(S): at 04:34

## 2025-05-22 RX ADMIN — Medication 4 MILLIGRAM(S): at 04:34

## 2025-05-22 RX ADMIN — Medication 25 MILLIGRAM(S): at 06:51

## 2025-05-22 RX ADMIN — Medication 0.2 MILLIGRAM(S): at 18:11

## 2025-05-22 RX ADMIN — Medication 0.2 MILLIGRAM(S): at 01:02

## 2025-05-22 RX ADMIN — Medication 0.5 MILLIGRAM(S): at 22:10

## 2025-05-22 RX ADMIN — Medication 1 MILLIGRAM(S): at 01:14

## 2025-05-22 RX ADMIN — Medication 0.2 MILLIGRAM(S): at 09:57

## 2025-05-22 RX ADMIN — Medication 25 MILLIGRAM(S): at 15:53

## 2025-05-22 RX ADMIN — Medication 0.2 MILLIGRAM(S): at 18:46

## 2025-05-22 RX ADMIN — Medication 40 MILLIEQUIVALENT(S): at 03:01

## 2025-05-22 RX ADMIN — Medication 2 TABLET(S): at 23:33

## 2025-05-22 RX ADMIN — Medication 25 MILLIGRAM(S): at 22:25

## 2025-05-22 RX ADMIN — Medication 0.2 MILLIGRAM(S): at 06:48

## 2025-05-22 RX ADMIN — SERTRALINE 50 MILLIGRAM(S): 100 TABLET, FILM COATED ORAL at 11:32

## 2025-05-22 RX ADMIN — Medication 0.5 MILLIGRAM(S): at 16:00

## 2025-05-22 RX ADMIN — Medication 2 MILLIGRAM(S): at 05:00

## 2025-05-22 RX ADMIN — Medication 0.5 MILLIGRAM(S): at 11:45

## 2025-05-22 RX ADMIN — Medication 0.5 MILLIGRAM(S): at 21:04

## 2025-05-22 RX ADMIN — Medication 0.5 MILLIGRAM(S): at 11:15

## 2025-05-22 RX ADMIN — Medication 25 MILLIGRAM(S): at 01:23

## 2025-05-22 RX ADMIN — Medication 2 MILLIGRAM(S): at 04:34

## 2025-05-22 RX ADMIN — Medication 75 MILLILITER(S): at 03:01

## 2025-05-22 NOTE — H&P ADULT - PROBLEM SELECTOR PLAN 3
-c/w amlodipine -GI discussed with Dr. Justice at [facility], ERCP was held, pt was resumed on diet.   -trend liver enzymes daily

## 2025-05-22 NOTE — H&P ADULT - PROBLEM SELECTOR PLAN 4
-c/w glargine 10u qHS + sliding scale.   -monitor FS and adjust insulin as needed given pt will be on prednisone 100mg qd x5d. -c/w amlodipine

## 2025-05-22 NOTE — DISCHARGE NOTE PROVIDER - NSDCMRMEDTOKEN_GEN_ALL_CORE_FT
acetaminophen 325 mg oral tablet: 3 tab(s) orally every 8 hours  bisacodyl 5 mg oral delayed release tablet: 1 tab(s) orally once a day As needed Constipation  HYDROmorphone: 0.5 milligram(s) intravenous every 4 hours as needed for  severe pain  HYDROmorphone 0.2 mg/mL-NaCl 0.9% intravenous solution: 1 milliliter(s) intravenous every 4 hours As needed Moderate Pain (4 - 6)  HYDROmorphone 0.2 mg/mL-NaCl 0.9% intravenous solution: 1 milliliter(s) intravenous every 4 hours As needed For Breakthrough pain  indomethacin 50 mg rectal suppository: 2 suppository(ies) rectal once  insulin glargine 100 units/mL subcutaneous solution: 10 unit(s) subcutaneous once a day (at bedtime)  insulin lispro 100 units/mL injectable solution: 1 unit(s) injectable 4 times a day (before meals and at bedtime) 2 Unit(s) if Glucose 151 - 200  4 Unit(s) if Glucose 201 - 250  6 Unit(s) if Glucose 251 - 300  8 Unit(s) if Glucose 301 - 350  10 Unit(s) if Glucose 351 - 400  12 Unit(s) if Glucose Greater Than 400  methocarbamol 500 mg oral tablet: 1 tab(s) orally 2 times a day As needed Muscle Spasm  polyethylene glycol 3350 oral powder for reconstitution: 17 gram(s) orally once a day  senna leaf extract oral tablet: 2 tab(s) orally once a day (at bedtime)  sertraline 50 mg oral tablet: 1 tab(s) orally once a day

## 2025-05-22 NOTE — CHART NOTE - NSCHARTNOTEFT_GEN_A_CORE
**Patient Information:**  - **Patient:** 54-year-old female  - **History:** Diffuse Large B-Cell Lymphoma, Diabetes Mellitus type 2, Hypertension, Depression    **Chief Complaint:** Progressive weakness, jaundice, nausea/vomiting, and poor intake for one week.    **Current Status and Management Plan:**    1. **Obstructive Jaundice:**     - GI service has been consulted and is pending evaluation.     - Patient is currently NPO in preparation for possible interventions.     - IV fluids are being administered to maintain hydration.     - A KUB (Kidney, Ureter, and Bladder) X-ray is pending results.     - Pain currently managed under existing protocols; added Dialudid 0.2mg Q4 PRN for breakthrough pain. Pain management team has been contacted for further assessment.    2. **Metastatic Diffuse Large B-cell Lymphoma:**     - Hematology/Oncology has been consulted for ongoing cancer management.    3. **Diabetes Mellitus Type 2:**     - Oral hypoglycemic medications have been held during the hospital stay.     - Patient is on Lantus 10 units every hour.     - Blood glucose levels are being monitored every 6 hours with sliding scale insulin coverage as necessary.     - A carbohydrate-counted diet is planned once the patient is off NPO status.    4. **Hypertension:**     - Continuation of Amlodipine 10 mg orally, once daily.    5. **Depression:**     - Zoloft 50 mg orally, once daily continues.    6. **Laboratory Monitoring:**     - Trending of Liver Function Tests (LFTs) added to monitor liver health due to the patient's jaundice and associated conditions.    **DVT Prophylaxis and Dietary Status:**  - Patient remains on standard DVT prophylaxis.  - Currently NPO pending further GI evaluation and recommendations.    **Plan:**  - Continue monitoring the patient’s response to treatments, and adjust as required based on specialist inputs and test results.  - Follow up on pending consultations and diagnostic studies, specifically focusing on the management of obstructive jaundice and overall oncological care.  - Effective pain management plan to be reassessed by the pain management team upon their consultation.    **Disposition:**  The patient remains admitted under observation with multidisciplinary involvement for comprehensive care. Adjustments in the management plan will be made as further diagnostic information becomes available and after specialist evaluations. All changes and updates will be documented in subsequent chart notes. No other changes from nocturnist note.
Kathy Yadav    54F with PMHx of newly diagnosed diffuse large B-cell lymphoma, scheduled to have PET scan and port placed next week (with Dr. Justice at [facility] presented to ED with progressively worsening generalized weakness and also noticed to have "yellowing"of the skin. Patient reported itching and juandice x 1 week. Reported constipation with overflow diarrhea x  1 week. Last BM 2 days ago, reported green color stool. Patient reported poor appetite, nausea, and vomiting (NBNB emesis) and  70 lb weight loss in past 1-2 weeks.     CT abd/pelvis : Moderate intra and extrahepatic biliary ductal dilation , new from prior study, with abrupt cutoff in the CBD at the hilar with soft tissue thickening in this region, measuring up to 4 x 4 cm, concerning for lymph node mass.  2.   Enlarged retroperitoneal , retrocrural lymph masses, measuring up to 6 cm, increased since prior study, concerning for metastases, lymphoma, leukemia .  3.   Urinary bladder wall thickening may be due to cystitis. Tiny air locule in the urinary bladder may be due to infectious or intervention.  4.   Mild-to-moderate right pleural effusion with underlying atelectasis and/or infiltrate.  5.  Hepatosplenomegaly with multiple splenic lesions, suspicious for metastasis.     # DLBCL No germinal center   - s/p core biopsy of left periaortic enlarged lymph node onl 05/2025 with path  consistent with Diffuse large B-cell lymphoma, non-germinal center B- cell type. Pending FISH/molceular studies   - T bili  at 10, with direct component at 8.5  with extrnisic compression of CBD likely from lymphadenopathy.       Recommendations:   - Please start prednisone 100mg daily for 5 days   - Please start allopurinol 300mg daily   - Please obtain CT chest w contrast   - Please order: coags, fibrinogen, LDH, uric acid, G6PD, hepatitis B serologies (including core antibody total) and hepatitis C, HIV  - Please order TTE    - Trend TLS labs every 12 hours (CMP, Phos, LDH, uric acid)  - Give rasburicase 3mg IV for uric acid > 8.0  - Plan for chemo: cytoxan and rituximab on 5/23   - Transfuse if Hgb < 7.0. Transfuse if platelets <10K, or <15K if febrile. Transfuse for platelets <50K if bleeding.    Please reach out to hospitalist for admission to [address] [place] [address] overnight.     Rafi Yossi Naveed, MD MS  Hematology/Oncology Fellow PGY-4  [employer] at [facility] | [facility] | [facility]  Available on Teams

## 2025-05-22 NOTE — H&P ADULT - NSHPPHYSICALEXAM_GEN_ALL_CORE
Vital Signs Last 24 Hrs  T(C): 36.8 (22 May 2025 03:32), Max: 36.9 (21 May 2025 20:37)  T(F): 98.2 (22 May 2025 03:32), Max: 98.4 (21 May 2025 20:37)  HR: 95 (22 May 2025 03:32) (95 - 106)  BP: 124/85 (22 May 2025 03:32) (124/85 - 160/75)  BP(mean): --  RR: 18 (22 May 2025 03:32) (18 - 20)  SpO2: 96% (22 May 2025 03:32) (96% - 98%)    Parameters below as of 22 May 2025 03:32  Patient On (Oxygen Delivery Method): nasal cannula  O2 Flow (L/min): 2 Vital Signs Last 24 Hrs  T(C): 36.8 (22 May 2025 03:32), Max: 36.9 (21 May 2025 20:37)  T(F): 98.2 (22 May 2025 03:32), Max: 98.4 (21 May 2025 20:37)  HR: 95 (22 May 2025 03:32) (95 - 106)  BP: 124/85 (22 May 2025 03:32) (124/85 - 160/75)  BP(mean): --  RR: 18 (22 May 2025 03:32) (18 - 20)  SpO2: 96% (22 May 2025 03:32) (96% - 98%)    Parameters below as of 22 May 2025 03:32  Patient On (Oxygen Delivery Method): nasal cannula    PHYSICAL EXAM:  Constitutional: Alert, NAD, comfortable   Head and Face: Atraumatic, head and face were normal in appearance  Eyes: +Sclera icterus   ENT: Ears and nose were normal in appearance, tongue moist  Neck: Appearance was normal, neck was supple, No JVD  Pulmonary: Clear to auscultation bilaterally, no rales/crackles, or wheezing  Heart: Regular rate and rhythm, no murmurs, gallops, or pericardial rubs  Abdominal: Soft, nontender, nondistended. No appreciable hepatosplenomegaly. Bowel sounds normal  Skin: +Jaundice   Extremities: Warm without edema. No clubbing.  Pulse: 2+ radial pulse  Neuro: Oriented to person, place, and time, no motor or sensory deficit

## 2025-05-22 NOTE — H&P ADULT - NSHPADDITIONALINFOADULT_GEN_ALL_CORE
Home meds prior to admission at [facility]:   home meds: 10u glargine qhs, methocarbamol 500 bid prn  melatonin, tylenol prn, amlodipine 10mg, albuterol prn, zoloft 50mg qd, pantoprazole 40 qd, ozempic 0.5mg qW

## 2025-05-22 NOTE — H&P ADULT - NSHPREVIEWOFSYSTEMS_GEN_ALL_CORE
REVIEW OF SYSTEMS:  CONSTITUTIONAL: No fever, weight loss, or fatigue  RESPIRATORY: No cough, wheezing, chills or hemoptysis; No shortness of breath  CARDIOVASCULAR: No chest pain, palpitations, dizziness, or leg swelling  GASTROINTESTINAL: + Diffuse abdominal pain, +Nausea, +vomiting, +Constipation, No hematemesis; No diarrhea, No melena or hematochezia.  NEUROLOGICAL: No headaches, loss of strength, numbness, or tremors  MUSCULOSKELETAL: + diffuse body pain

## 2025-05-22 NOTE — H&P ADULT - ASSESSMENT
ASSESSMENT        PLAN        DVT ppx:  Diet:   Dispo: ASSESSMENT  54F with PMHx of Diffuse large B-cell lymphoma, presenting with progressive weakness, jaundice, N/V and poor intake x 1 week.     PLAN  Obstructive jaundice   - GI consulted   - NPO for now, pending GI eval      Metastatic Diffuse large B-cell lymphoma  - Heme/Onc consulted    DM2  - Hold oral Hypoglycemics while in hospital  - Lantus 10U QH  - Accu-checks Q6hr & ISS insulin coverage   - Carb counted diet    HTN  - Amlodipine 10mg po QD    Depression  - Zoloft 50mg po QD    DVT ppx:  Diet: NPO pending GI eval  Dispo: ASSESSMENT  54F with PMHx of Diffuse large B-cell lymphoma, presenting with progressive weakness, jaundice, N/V and poor intake x 1 week.     PLAN  Obstructive jaundice   - GI consulted   - NPO for now, pending GI eval  - IVF for now  - KUB pending   - Pain management     Metastatic Diffuse large B-cell lymphoma  - Heme/Onc consulted    DM2  - Hold oral Hypoglycemics while in hospital  - Lantus 10U QH  - Accu-checks Q6hr & ISS insulin coverage   - Carb counted diet    HTN  - Amlodipine 10mg po QD    Depression  - Zoloft 50mg po QD    DVT ppx:  Diet: NPO pending GI eval

## 2025-05-22 NOTE — H&P ADULT - PROBLEM SELECTOR PLAN 6
VTE ppx: hep sq   GI ppx: home ppi   Med rec: reviewed [facility] record.   Diet: DM diet.

## 2025-05-22 NOTE — H&P ADULT - NSHPPHYSICALEXAM_GEN_ALL_CORE
Vital Signs Last 24 Hrs  T(C): 36.2 (22 May 2025 19:55), Max: 37.2 (22 May 2025 05:00)  T(F): 97.1 (22 May 2025 19:55), Max: 98.9 (22 May 2025 05:00)  HR: 96 (22 May 2025 19:55) (90 - 99)  BP: 144/80 (22 May 2025 19:55) (116/71 - 145/77)  BP(mean): 99 (22 May 2025 18:42) (99 - 100)  RR: 18 (22 May 2025 19:55) (18 - 18)  SpO2: 95% (22 May 2025 19:55) (94% - 97%)    Parameters below as of 22 May 2025 19:55  Patient On (Oxygen Delivery Method): nasal cannula  O2 Flow (L/min): 2      CONSTITUTIONAL: Well-groomed, in no apparent distress  EYES: No conjunctival or scleral injection, b/l icteric sclera.   ENMT: No external nasal lesions; MMM  RESPIRATORY: Breathing comfortably; lungs CTA without wheeze/rhonchi/rales  CARDIOVASCULAR: +S1S2, RRR; no lower extremity edema  GASTROINTESTINAL:  +BS throughout, no rebound/guarding  NEUROLOGIC: No gross focal neurological deficits, AAOX3  PSYCHIATRIC: mood and affect appropriate; appropriate insight and judgment

## 2025-05-22 NOTE — CONSULT NOTE ADULT - CONSULT REASON
"Problem: Suicide Risk  Goal: Absence of Self-Harm  Outcome: Improving: Pt is on 1:1 sitter for suicidal ideations. Pt had and \"anxiety/panic\" attack earlier this morning. PRN Gabapentin given. Pt stated \"This will not help. I don't like this small space and I feel like opening the door and running out\". Text paged and updated Dr. Marks. New order for Ativan given. Pt calmed down some. Still continues to be restless.    Problem: Gas Exchange Impaired  Goal: Optimal Gas Exchange  Outcome: Improving: Pt has been on 1.5L O2 via nc today. Has tried to wean off oxygen several times today but has been unsuccessful. Will continue to monitor.    Pt is independent in room. Stated he has not had a BM in a few days. Sm one yesterday. PRN Senna was given.  Pt utilizing Nicorette Gum.            "
Obstructive Jaundice
Large cell lymphoma

## 2025-05-22 NOTE — CONSULT NOTE ADULT - SUBJECTIVE AND OBJECTIVE BOX
REASON FOR CONSULTATION:     HPI:  54F with PMHx of Diffuse large B-cell lymphoma, presenting with progressive weakness, jaundice, N/V and poor intake x 1 week. Pt reports she recently found out she has metastatic disease and confirm biopsy pathology and has appointment coming up with [facility] oncology. Pt also reports diarrhea and dark urine. Pt has not had a BM for past 2 days and has not passed gas since yesterday. During exam pt think she might want to have a BM, bowel sound present on all 4 quadrants on exam. GI and Heme/Onc consulted. (22 May 2025 03:43)      REVIEW OF SYSTEMS:  Constitutional, Eyes, ENT, Cardiovascular, Respiratory, Gastrointestinal, Genitourinary, Musculoskeletal, Integumentary, Neurological, Psychiatric, Endocrine, Heme/Lymph, and Allergic/Immunologic review of systems are otherwise negative except as noted in the HPI.    PAST MEDICAL & SURGICAL HISTORY:  HTN (hypertension)      Diabetes      Asthma      BERTRAM (acute kidney injury)      History of lymphadenopathy      Obesity      GERD (gastroesophageal reflux disease)      H/O: depression      S/P hernia repair      S/P dilation and curettage      S/P appendectomy          FAMILY HISTORY:  FH: HTN (hypertension) (Father, Mother)    FHx: hyperlipidemia (Father, Mother)        SOCIAL HISTORY:    Allergies    azithromycin (Unknown)  Pepcid (Hives; Rash)  Januvia (Rash)    Intolerances        MEDICATIONS  (STANDING):  acetaminophen     Tablet .. 975 milliGRAM(s) Oral every 8 hours  amLODIPine   Tablet 10 milliGRAM(s) Oral daily  dextrose 5%. 1000 milliLiter(s) (50 mL/Hr) IV Continuous <Continuous>  dextrose 50% Injectable 25 Gram(s) IV Push once  ertapenem  IVPB 1000 milliGRAM(s) IV Intermittent once  glucagon  Injectable 1 milliGRAM(s) IntraMuscular once  indomethacin Suppository 100 milliGRAM(s) Rectal once  insulin glargine Injectable (LANTUS) 10 Unit(s) SubCutaneous at bedtime  insulin lispro (ADMELOG) corrective regimen sliding scale   SubCutaneous three times a day before meals  naloxone Injectable 0.4 milliGRAM(s) IV Push once  polyethylene glycol 3350 17 Gram(s) Oral daily  senna 2 Tablet(s) Oral at bedtime  sertraline 50 milliGRAM(s) Oral daily  sodium chloride 0.9%. 1000 milliLiter(s) (75 mL/Hr) IV Continuous <Continuous>    MEDICATIONS  (PRN):  albuterol    90 MICROgram(s) HFA Inhaler 2 Puff(s) Inhalation every 6 hours PRN for shortness of breath and/or wheezing  aluminum hydroxide/magnesium hydroxide/simethicone Suspension 30 milliLiter(s) Oral every 4 hours PRN Dyspepsia  bisacodyl 5 milliGRAM(s) Oral daily PRN Constipation  dextrose Oral Gel 15 Gram(s) Oral once PRN Blood Glucose LESS THAN 70 milliGRAM(s)/deciliter  diphenhydrAMINE 25 milliGRAM(s) Oral every 6 hours PRN Rash and/or Itching  HYDROmorphone  Injectable 0.2 milliGRAM(s) IV Push every 4 hours PRN Moderate Pain (4 - 6)  HYDROmorphone  Injectable 0.5 milliGRAM(s) IV Push every 4 hours PRN Severe Pain (7 - 10)  HYDROmorphone  Injectable 0.2 milliGRAM(s) IV Push every 4 hours PRN For Breakthrough pain  melatonin 3 milliGRAM(s) Oral at bedtime PRN Insomnia  methocarbamol 500 milliGRAM(s) Oral two times a day PRN Muscle Spasm  ondansetron Injectable 4 milliGRAM(s) IV Push every 8 hours PRN Nausea and/or Vomiting      Vital Signs Last 24 Hrs  T(C): 36.9 (22 May 2025 08:45), Max: 37.2 (22 May 2025 05:00)  T(F): 98.5 (22 May 2025 08:45), Max: 98.9 (22 May 2025 05:00)  HR: 96 (22 May 2025 08:45) (90 - 106)  BP: 116/71 (22 May 2025 08:45) (116/71 - 160/75)  BP(mean): --  RR: 18 (22 May 2025 08:45) (18 - 20)  SpO2: 96% (22 May 2025 08:45) (95% - 98%)    Parameters below as of 22 May 2025 08:45  Patient On (Oxygen Delivery Method): nasal cannula  O2 Flow (L/min): 2      PHYSICAL EXAM:          LABS:                        9.8    11.13 )-----------( 397      ( 22 May 2025 03:34 )             32.2     05-22    130[L]  |  91[L]  |  17.2  ----------------------------<  91  3.4[L]   |  25.0  |  0.68    Ca    10.8[H]      22 May 2025 03:34  Phos  3.4     05-22  Mg     1.8     05-22    TPro  5.8[L]  /  Alb  2.6[L]  /  TBili  9.6[H]  /  DBili  8.5[H]  /  AST  299[H]  /  ALT  164[H]  /  AlkPhos  2324[H]  05-22    PT/INR - ( 21 May 2025 21:09 )   PT: 16.0 sec;   INR: 1.38 ratio         PTT - ( 21 May 2025 21:09 )  PTT:36.8 sec  Urinalysis Basic - ( 22 May 2025 03:34 )    Color: x / Appearance: x / SG: x / pH: x  Gluc: 91 mg/dL / Ketone: x  / Bili: x / Urobili: x   Blood: x / Protein: x / Nitrite: x   Leuk Esterase: x / RBC: x / WBC x   Sq Epi: x / Non Sq Epi: x / Bacteria: x          RADIOLOGY & ADDITIONAL STUDIES:    PATHOLOGY:

## 2025-05-22 NOTE — H&P ADULT - NSHPLABSRESULTS_GEN_ALL_CORE
< from: US Abdomen Upper Quadrant Right (05.21.25 @ 23:44) >    IMPRESSION:  Multicystic lesion within the kristy hepatis measuring 10.9 x 8.7 x 8.4   cm, likely corresponding to lymphadenopathy seen on prior CT.    Gallbladder is not well visualized. Redemonstrated dilated common bile   duct.    Hepatic steatosis.        --- End of Report ---    < end of copied text >    < from: CT Abdomen and Pelvis w/ IV Cont (05.22.25 @ 01:09) >    IMPRESSION:  1.   Moderate intra and extrahepatic biliary ductal dilation , new from   prior  study, with abrupt cutoff in the CBD at the hilar with soft tissue   thickening  in this region, measuring up to 4 x 4 cm, concerning for lymph node mass.  2.   Enlarged retroperitoneal , retrocrural lymph masses, measuring up to   6 cm,  increased since prior study, concerning for metastases, lymphoma leukemia   .  3.   Urinary bladder wall thickening may bedue to cystitis. Tiny air   locule in  the urinary bladder may be due to infectious or intervention.  4.   Mild-to-moderate right pleural effusion with underlying atelectasis   and/or  infiltrate.    < end of copied text >

## 2025-05-22 NOTE — H&P ADULT - ATTENDING COMMENTS
54-year-old female with a PMH of B-Cell Lymphoma (Newly Dx), DM, HTN, GERD, PSH of hernia repair & Appendectomy presents to the ED for 4 days history of generalized weakness, abdominal pain, nausea, NBNB vomiting with inability to tolerate PO intake. Also endorses multiple episodes of diarrhea with fever and chills prior which has subsided. Over the past few day has noticed jaundice of her skin, Sclera Icterus & darkening of her urine. Upon evaluation, patient is jaundice with sclera icterus. Endorses diffused abdominal pain with a severity of 10/10, non-radiating.   Labs remarkable for WBC: 11.70, H/H:10.3/33.3, Na:132, K:3.4, Ca:10.9 (Corrected:11.4), Albumin:2.7, PT:6.2, T-Bili:10, AlkP/AST/ALT:2409/272/165.   Hypercalcemia most likely 2/2 underlying lymphoma. F/U Labs. IV-Fluid   New CT- ABD; Remarkable for Extrahepatic Biliary Duct dilation  Has a PET scan scheduled with placement of Chemo-Port and MUGA scan.  Initial appointment at [facility] with Dr. Wilson is scheduled.     Plan as per resident’s note above.

## 2025-05-22 NOTE — H&P ADULT - PROBLEM SELECTOR PLAN 2
-GI discussed with Dr. Justice at [facility], ERCP was held, pt was resumed on diet.   -trend liver enzymes daily -will trial morphine 2mg ivp for mod pain or 4mg ivp for severe pain; pt was on dilaudid 0.2mg

## 2025-05-22 NOTE — H&P ADULT - ASSESSMENT
54F PMHx recently dx'd DLBCL, transferred from [facility] with obstructive jaundice to start chemotherapy inpatient.

## 2025-05-22 NOTE — DISCHARGE NOTE PROVIDER - CARE PROVIDER_API CALL
Sharron Justice  Hematology  [address], [place] [address]  Phone: [phone]  Fax: [phone]  Follow Up Time: 2 weeks

## 2025-05-22 NOTE — PATIENT PROFILE ADULT - FALL HARM RISK - HARM RISK INTERVENTIONS
Assistance with ambulation/Assistance OOB with selected safe patient handling equipment/Communicate Risk of Fall with Harm to all staff/Monitor gait and stability/Reinforce activity limits and safety measures with patient and family/Sit up slowly, dangle for a short time, stand at bedside before walking/Tailored Fall Risk Interventions/Visual Cue: Yellow wristband and red socks/Bed in lowest position, wheels locked, appropriate side rails in place/Call bell, personal items and telephone in reach/Instruct patient to call for assistance before getting out of bed or chair/Non-slip footwear when patient is out of bed/[place] to call system/Physically safe environment - no spills, clutter or unnecessary equipment/Purposeful Proactive Rounding/Room/bathroom lighting operational, light cord in reach

## 2025-05-22 NOTE — H&P ADULT - PROBLEM SELECTOR PLAN 5
VTE ppx: hep sq   GI ppx: home ppi   Med rec: reviewed [facility] record.   Diet: DM diet. -c/w glargine 10u qHS + sliding scale.   -monitor FS and adjust insulin as needed given pt will be on prednisone 100mg qd x5d.

## 2025-05-22 NOTE — DISCHARGE NOTE PROVIDER - NSDCCPCAREPLAN_GEN_ALL_CORE_FT
PRINCIPAL DISCHARGE DIAGNOSIS  Diagnosis: Obstructive jaundice  Assessment and Plan of Treatment: Plan to transfer to [facility] under care of Dr. Mason for inpatient chemo after discussion w/ [facility]/Onc      SECONDARY DISCHARGE DIAGNOSES  Diagnosis: B-cell lymphoma  Assessment and Plan of Treatment:

## 2025-05-22 NOTE — PATIENT PROFILE ADULT - DOES PATIENT HAVE ADVANCE DIRECTIVE
CPAP download report from 1/23/2022-2/21/2022 on auto CPAP 11-15 cm H2O reviewed. Compliance is good 83%. AHI has elevated 11.3. Suspect some residual CSA.     Please send order to change back to auto CPAP 10-14 cm H2O and look for new report about 30 days after pressure is changed No

## 2025-05-22 NOTE — H&P ADULT - PROBLEM SELECTOR PLAN 1
- started prednisone 100mg daily for 5 days   - started allopurinol 300mg daily   - f/u CT chest w contrast   - ordered: coags, fibrinogen, LDH, uric acid, G6PD, hepatitis B serologies (including core antibody total) and hepatitis C, HIV  - f/u TTE    - Trend TLS labs every 12 hours (CMP, Phos, LDH, uric acid) -> ordered for q12h x5d until 5/27, renew order as needed.   - Give rasburicase 3mg IV for uric acid > 8.0  - Plan for chemo: cytoxan and rituximab on 5/23   - Transfuse if Hgb < 7.0. Transfuse if platelets <10K, or <15K if febrile. Transfuse for platelets <50K if bleeding.

## 2025-05-22 NOTE — DISCHARGE NOTE PROVIDER - HOSPITAL COURSE
54F with PMHx of Diffuse large B-cell lymphoma, presenting with progressive weakness, jaundice, N/V and poor intake x 1 week.     PLAN  Obstructive jaundice   - GI consulted   - no ERCP as per GI  - IVF for now  - KUB pending   - Pain management     Metastatic Diffuse large B-cell lymphoma  - Heme/Onc consulted    DM2  - Hold oral Hypoglycemics while in hospital  - Lantus 10U QH  - Accu-checks Q6hr & ISS insulin coverage   - Carb counted diet    HTN  - Amlodipine 10mg po QD    Depression  - Zoloft 50mg po QD    Plan to Transfer to [facility] for Inpatient chemo

## 2025-05-22 NOTE — H&P ADULT - NSHPLABSRESULTS_GEN_ALL_CORE
9.8    11.13 )-----------( 397      ( 22 May 2025 03:34 )             32.2       05-22    130[L]  |  91[L]  |  17.2  ----------------------------<  91  3.4[L]   |  25.0  |  0.68    Ca    10.8[H]      22 May 2025 03:34  Phos  3.4     05-22  Mg     1.8     05-22    TPro  5.8[L]  /  Alb  2.6[L]  /  TBili  9.6[H]  /  DBili  8.5[H]  /  AST  299[H]  /  ALT  164[H]  /  AlkPhos  2324[H]  05-22              Urinalysis Basic - ( 22 May 2025 03:34 )    Color: x / Appearance: x / SG: x / pH: x  Gluc: 91 mg/dL / Ketone: x  / Bili: x / Urobili: x   Blood: x / Protein: x / Nitrite: x   Leuk Esterase: x / RBC: x / WBC x   Sq Epi: x / Non Sq Epi: x / Bacteria: x        PT/INR - ( 21 May 2025 21:09 )   PT: 16.0 sec;   INR: 1.38 ratio         PTT - ( 21 May 2025 21:09 )  PTT:36.8 sec          CAPILLARY BLOOD GLUCOSE      POCT Blood Glucose.: 105 mg/dL (22 May 2025 21:05)

## 2025-05-22 NOTE — H&P ADULT - HISTORY OF PRESENT ILLNESS
54F PMHx newly dx'd DLBCL, was admitted at [facility] for jaundice and diarrhea x1wk, green in color; also reported malaise, anorexia, n/v (NBNB emesis), and 70 lb unintentional wt loss in 1-2wk.   CT a/p showed moderate intra/extrahepatic biliary ductal dilation w/ abrupt cutoff in CBD at the hilar with soft tissue measuring up to 4x4cm c/f lymph node mass, enlarged RP and retrocrural LAD, urinary bladder wall thickening, mild/mod R side pleural effusion, HSM w/ lesions, c/f mets.   For obstructive jaundice, GI was consulted there, no ERCP rec at the time.   Pt was transferred to [facility] for inpatient chemo.     On arrival to [facility], pt afebrile, HR 96, /80, and 2LNC saturating 95%.   Review of labs from [facility] included wbc 11.13, hgb 9.8, plt 397, Na 130, K 3.4, SCr 0.68, alk phos 2324, , , Tbili 9.6, .    54F PMHx newly dx'd DLBCL, was admitted at [facility] for jaundice and diarrhea x1wk, green in color; also reported malaise, anorexia, n/v (NBNB emesis), and 70 lb unintentional wt loss in 1-2wk.   CT a/p showed moderate intra/extrahepatic biliary ductal dilation w/ abrupt cutoff in CBD at the hilar with soft tissue measuring up to 4x4cm c/f lymph node mass, enlarged RP and retrocrural LAD, urinary bladder wall thickening, mild/mod R side pleural effusion, HSM w/ lesions, c/f mets.   For obstructive jaundice, GI was consulted there, no ERCP rec at the time.   Pt was transferred to [facility] for inpatient chemo.     On arrival to [facility], pt afebrile, HR 96, /80, and 2LNC saturating 95%.   Review of labs from [facility] included wbc 11.13, hgb 9.8, plt 397, Na 130, K 3.4, SCr 0.68, alk phos 2324, , , Tbili 9.6, .     Pt interviewed by bedside. AAOx4, reports severe pain, mostly abdominal area. Also reports frequent gas.   Took extensive time by bedside, discussing current diagnosis, workup done, workup planned and expected hospital course.

## 2025-05-22 NOTE — CONSULT NOTE ADULT - SUBJECTIVE AND OBJECTIVE BOX
HISTORY OF PRESENT ILLNESS: 54F with PMHx of Diffuse large B-cell lymphoma (diagnosed November 2024, s/p biopsy) scheduled to have PET scan and port placed next week (with Dr. Thompson at [facility] presented to ED with progressively worsening generalized weak ness and also noticed to have "yellowing"of the skin. Patient reported itching and juandice x 1 week. Reported constipation with overflow diarrhea x  1 week. Last BM 2 days ago, reported green color stool. Patient reported poor appetite, nausea, and vomiting (NBNB emesis) and  70 lb weight loss in past 1-2 weeks. She was taking Ozempic and her last dose was on April 16th this year. She denies excessive use of Alchohol. Endorses cigarette smoking in the past (1/2 to 1 ppd for 40 years, quit 1 year ago). Endorses use of Marijuana in the past (age of 21 years). She denies use of NSAIDs or anticoagulation.  Lab report significant for T bili 10, ALP 2400, / .  CT abd shows moderate intra and extrahepatic biliary ductal dilation , new from prior study, with abrupt cutoff in the CBD at the hilar with soft tissue thickening in this region, measuring up to 4 x 4 cm, concerning for lymph node mass. Enlarged retroperitoneal , retrocrural lymph masses, measuring up to 6 cm, increased since prior study, concerning for metastases, lymphoma, leukemia .    Review of Systems:  . Constitutional: No fever, chills  . HEENT: Negative  · Respiratory and Thorax: No shortness of breath, no cough  · Cardiovascular: No chest pain, palpitation, no dizziness  · Gastrointestinal: see above  · Genitourinary: No hematuria  · Musculoskeletal: chronic back pain  · Neurological: negative  · Psychiatric: no agitation, no anxiety    PAST MEDICAL/SURGICAL HISTORY:  HTN (hypertension)    Diabetes    Asthma    BERTRAM (acute kidney injury)    History of lymphadenopathy    Obesity    GERD (gastroesophageal reflux disease)    H/O: depression    S/P hernia repair    S/P dilation and curettage    S/P appendectomy      SOCIAL HISTORY:  - TOBACCO: Former use, see HPI  - ALCOHOL: Former use, see HPI  - ILLICIT DRUG USE: Former use, see HPI    FAMILY HISTORY:  No known history of gastrointestinal or liver disease;  FH: HTN (hypertension) (Father, Mother)    FHx: hyperlipidemia (Father, Mother)      HOME MEDICATIONS:  acetaminophen 325 mg oral tablet: 2 tab(s) orally every 6 hours As needed Temp greater or equal to 38C (100.4F), Mild Pain (1 - 3) (22 May 2025 02:41)  Albuterol (Eqv-Ventolin HFA) 90 mcg/inh inhalation aerosol: 2 inhaled every 6 hours as needed for  shortness of breath and/or wheezing (22 May 2025 02:41)  amLODIPine 10 mg oral tablet: 1 tab(s) orally once a day (22 May 2025 02:41)  insulin glargine 100 units/mL subcutaneous solution: 10 unit(s) subcutaneous once a day (at bedtime) (22 May 2025 02:41)  lidocaine 4% topical film: Apply topically to affected area once a day as needed for  mild pain MDD: 1 (22 May 2025 02:41)  melatonin 3 mg oral tablet: 1 tab(s) orally once a day (at bedtime) As needed Insomnia (22 May 2025 02:41)  Ozempic 2 mg/3 mL (0.25 mg or 0.5 mg dose) subcutaneous solution: 0.5 milligram(s) subcutaneously every 7 days .5 mg every 7 days (22 May 2025 02:41)  pantoprazole 40 mg oral delayed release tablet: 1 tab(s) orally once a day (22 May 2025 02:41)  Zoloft 50 mg oral tablet: 1 tab(s) orally once a day (22 May 2025 02:41)    INPATIENT MEDICATIONS:  MEDICATIONS  (STANDING):  acetaminophen     Tablet .. 975 milliGRAM(s) Oral every 8 hours  amLODIPine   Tablet 10 milliGRAM(s) Oral daily  dextrose 5%. 1000 milliLiter(s) (50 mL/Hr) IV Continuous <Continuous>  dextrose 50% Injectable 25 Gram(s) IV Push once  ertapenem  IVPB 1000 milliGRAM(s) IV Intermittent once  glucagon  Injectable 1 milliGRAM(s) IntraMuscular once  indomethacin Suppository 100 milliGRAM(s) Rectal once  insulin glargine Injectable (LANTUS) 10 Unit(s) SubCutaneous at bedtime  insulin lispro (ADMELOG) corrective regimen sliding scale   SubCutaneous three times a day before meals  naloxone Injectable 0.4 milliGRAM(s) IV Push once  polyethylene glycol 3350 17 Gram(s) Oral daily  senna 2 Tablet(s) Oral at bedtime  sertraline 50 milliGRAM(s) Oral daily  sodium chloride 0.9%. 1000 milliLiter(s) (75 mL/Hr) IV Continuous <Continuous>    MEDICATIONS  (PRN):  albuterol    90 MICROgram(s) HFA Inhaler 2 Puff(s) Inhalation every 6 hours PRN for shortness of breath and/or wheezing  aluminum hydroxide/magnesium hydroxide/simethicone Suspension 30 milliLiter(s) Oral every 4 hours PRN Dyspepsia  bisacodyl 5 milliGRAM(s) Oral daily PRN Constipation  dextrose Oral Gel 15 Gram(s) Oral once PRN Blood Glucose LESS THAN 70 milliGRAM(s)/deciliter  diphenhydrAMINE 25 milliGRAM(s) Oral every 6 hours PRN Rash and/or Itching  HYDROmorphone  Injectable 0.2 milliGRAM(s) IV Push every 4 hours PRN Moderate Pain (4 - 6)  HYDROmorphone  Injectable 0.5 milliGRAM(s) IV Push every 4 hours PRN Severe Pain (7 - 10)  HYDROmorphone  Injectable 0.2 milliGRAM(s) IV Push every 4 hours PRN For Breakthrough pain  melatonin 3 milliGRAM(s) Oral at bedtime PRN Insomnia  methocarbamol 500 milliGRAM(s) Oral two times a day PRN Muscle Spasm  ondansetron Injectable 4 milliGRAM(s) IV Push every 8 hours PRN Nausea and/or Vomiting    ALLERGIES:  azithromycin (Unknown)  Pepcid (Hives; Rash)  Januvia (Rash)    T(C): 37.2 (05-22-25 @ 05:00), Max: 37.2 (05-22-25 @ 05:00)  HR: 90 (05-22-25 @ 05:00) (90 - 106)  BP: 125/75 (05-22-25 @ 05:00) (124/85 - 160/75)  RR: 18 (05-22-25 @ 05:00) (18 - 20)  SpO2: 95% (05-22-25 @ 05:00) (95% - 98%)      PHYSICAL EXAM:    Constitutional: No acute distress noted   Eye: icteric   Neurological: alert, awake  Respiratory: No increased effort  Cardiovascular: S1S2  Gastrointestinal: Soft, nontender, non distended, +BS  Extremities: No edema  Skin: + jaundice  Psychiatric: Calm, cooperative      LABS:             9.8    11.13 )-----------( 397      ( 05-22 @ 03:34 )             32.2                10.3   11.70 )-----------( 403      ( 05-21 @ 21:09 )             33.3       PT/INR - ( 21 May 2025 21:09 )   PT: 16.0 sec;   INR: 1.38 ratio         PTT - ( 21 May 2025 21:09 )  PTT:36.8 sec  05-22    130[L]  |  91[L]  |  17.2  ----------------------------<  91  3.4[L]   |  25.0  |  0.68    Ca    10.8[H]      22 May 2025 03:34    TPro  5.8[L]  /  Alb  2.6[L]  /  TBili  9.6[H]  /  DBili  8.5[H]  /  AST  299[H]  /  ALT  164[H]  /  AlkPhos  2324[H]  05-22    LIVER FUNCTIONS - ( 22 May 2025 03:34 )  Alb: 2.6 g/dL / Pro: 5.8 g/dL / ALK PHOS: 2324 U/L / ALT: 164 U/L / AST: 299 U/L / GGT: x             Lipase: 14 U/L (05-21-25 @ 21:09)      Urinalysis Basic - ( 22 May 2025 03:34 )    Color: x / Appearance: x / SG: x / pH: x  Gluc: 91 mg/dL / Ketone: x  / Bili: x / Urobili: x   Blood: x / Protein: x / Nitrite: x   Leuk Esterase: x / RBC: x / WBC x   Sq Epi: x / Non Sq Epi: x / Bacteria: x        Urinalysis with Rflx Culture (collected 22 May 2025 00:45)    < from: CT Abdomen and Pelvis w/ IV Cont (05.22.25 @ 01:09) >  COMPARISON:  CT ABDOMEN AND PELVIS WITH IV CONTRAST 4/28/2025 9:21 PM    FINDINGS:  Pleural spaces: Mild-to-moderate right pleural effusion with underlying  atelectasis and/or infiltrate.    Liver:Hepatomegaly, measuring 22 cm in cranial to caudal dimension.  Gallbladder and biliary ducts: Collapsed gallbladder. Moderate intra and  extrahepatic biliary ductal dilation , new from prior study, with abrupt   cutoff  in the CBD at the hilar with soft tissue thickening in this region,   measuring  up to 4 x 4 cm, concerning for an enlarged lymph node  Pancreas: Normal. No ductal dilation.  Spleen: Multiple hypodense splenic lesions, increased since prior study,  concerning for metastases. Splenomegaly, measuring approximately 14.6 cm   in cranial to caudal dimension.  Adrenal glands: Normal. No mass.  Kidneys and ureters: Normal. No hydronephrosis.  Stomach and bowel: Stomach, small bowel loops normal. No bowel   obstruction.  Colonic diverticulosis.  Appendix: No evidence of appendicitis.    Intraperitoneal space: Trace free fluid in pelvis.  Vasculature: Atherosclerotic aortic disease without aneurysm.  Lymph nodes: Enlarged retroperitoneal lymph masses, measuring up to 6 cm,  increased since prior study, concerning for metastases, lymphoma ,   leukemia .  Partially visualized enlarged right inguinal lymph node measuring 1.5 cm.  Enlarged retrocrural lymph nodes.  Urinary bladder: Urinary bladder wall thickening may be due to cystitis.   Tiny  air locule in the urinary bladder may be due to infectious or   intervention.  Reproductive: Unremarkable as visualized.  Bones/joints: Degenerate changes in thoracolumbar spine. No acute   fracture.  Soft tissues: Unremarkable.    IMPRESSION:  1.   Moderate intra and extrahepatic biliary ductal dilation , new from   prior  study, with abrupt cutoff in the CBD at the hilar with soft tissue   thickening  in this region, measuring up to 4 x 4 cm, concerning for lymph node mass.  2.   Enlarged retroperitoneal , retrocrural lymph masses, measuring up to   6 cm,  increased since prior study, concerning for metastases, lymphoma,   leukemia .  3.   Urinary bladder wall thickening may be due to cystitis. Tiny air   locule in  the urinarybladder may be due to infectious or intervention.  4.   Mild-to-moderate right pleural effusion with underlying atelectasis   and/or  infiltrate.  5.  Hepatosplenomegaly with multiple splenic lesions, suspicious for   metastasis.    < end of copied text >    < from: US Abdomen Upper Quadrant Right (05.21.25 @ 23:44) >  FINDINGS:  Liver: Increased echogenicity.  Bile ducts: Dilated. Common bile duct measures 14 mm.  Gallbladder: Not well-visualized.  Pancreas: Visualized portions are within normal limits.  Right kidney: 9.9 cm. No hydronephrosis.  Ascites: None.  IVC: Not well-visualized.    Within the kristy hepatis, there is a multi cystic lesion measuring 10.9 x   8.7 x 8.4 cm again seen.    IMPRESSION:  Multicystic lesion within the kristy hepatis measuring 10.9 x 8.7 x 8.4   cm, likely corresponding to lymphadenopathy seen on prior CT.    Gallbladder is not well visualized. Redemonstrated dilated common bile   duct.    Hepatic steatosis.    < end of copied text >     HISTORY OF PRESENT ILLNESS: 54F with PMHx of Diffuse large B-cell lymphoma (diagnosed November 2024, s/p biopsy) scheduled to have PET scan and port placed next week (with Dr. Justice at [facility] presented to ED with progressively worsening generalized weak ness and also noticed to have "yellowing"of the skin. Patient reported itching and juandice x 1 week. Reported constipation with overflow diarrhea x  1 week. Last BM 2 days ago, reported green color stool. Patient reported poor appetite, nausea, and vomiting (NBNB emesis) and  70 lb weight loss in past 1-2 weeks. She was taking Ozempic and her last dose was on April 16th this year. She denies excessive use of Alchohol. Endorses cigarette smoking in the past (1/2 to 1 ppd for 40 years, quit 1 year ago). Endorses use of Marijuana in the past (age of 21 years). She denies use of NSAIDs or anticoagulation.  Lab report significant for T bili 10, ALP 2400, / .  CT abd shows moderate intra and extrahepatic biliary ductal dilation , new from prior study, with abrupt cutoff in the CBD at the hilar with soft tissue thickening in this region, measuring up to 4 x 4 cm, concerning for lymph node mass. Enlarged retroperitoneal , retrocrural lymph masses, measuring up to 6 cm, increased since prior study, concerning for metastases, lymphoma, leukemia .    Review of Systems:  . Constitutional: No fever, chills  . HEENT: Negative  · Respiratory and Thorax: No shortness of breath, no cough  · Cardiovascular: No chest pain, palpitation, no dizziness  · Gastrointestinal: see above  · Genitourinary: No hematuria  · Musculoskeletal: chronic back pain  · Neurological: negative  · Psychiatric: no agitation, no anxiety    PAST MEDICAL/SURGICAL HISTORY:  HTN (hypertension)    Diabetes    Asthma    BERTRAM (acute kidney injury)    History of lymphadenopathy    Obesity    GERD (gastroesophageal reflux disease)    H/O: depression    S/P hernia repair    S/P dilation and curettage    S/P appendectomy      SOCIAL HISTORY:  - TOBACCO: Former use, see HPI  - ALCOHOL: Former use, see HPI  - ILLICIT DRUG USE: Former use, see HPI    FAMILY HISTORY:  No known history of gastrointestinal or liver disease;  FH: HTN (hypertension) (Father, Mother)    FHx: hyperlipidemia (Father, Mother)      HOME MEDICATIONS:  acetaminophen 325 mg oral tablet: 2 tab(s) orally every 6 hours As needed Temp greater or equal to 38C (100.4F), Mild Pain (1 - 3) (22 May 2025 02:41)  Albuterol (Eqv-Ventolin HFA) 90 mcg/inh inhalation aerosol: 2 inhaled every 6 hours as needed for  shortness of breath and/or wheezing (22 May 2025 02:41)  amLODIPine 10 mg oral tablet: 1 tab(s) orally once a day (22 May 2025 02:41)  insulin glargine 100 units/mL subcutaneous solution: 10 unit(s) subcutaneous once a day (at bedtime) (22 May 2025 02:41)  lidocaine 4% topical film: Apply topically to affected area once a day as needed for  mild pain MDD: 1 (22 May 2025 02:41)  melatonin 3 mg oral tablet: 1 tab(s) orally once a day (at bedtime) As needed Insomnia (22 May 2025 02:41)  Ozempic 2 mg/3 mL (0.25 mg or 0.5 mg dose) subcutaneous solution: 0.5 milligram(s) subcutaneously every 7 days .5 mg every 7 days (22 May 2025 02:41)  pantoprazole 40 mg oral delayed release tablet: 1 tab(s) orally once a day (22 May 2025 02:41)  Zoloft 50 mg oral tablet: 1 tab(s) orally once a day (22 May 2025 02:41)    INPATIENT MEDICATIONS:  MEDICATIONS  (STANDING):  acetaminophen     Tablet .. 975 milliGRAM(s) Oral every 8 hours  amLODIPine   Tablet 10 milliGRAM(s) Oral daily  dextrose 5%. 1000 milliLiter(s) (50 mL/Hr) IV Continuous <Continuous>  dextrose 50% Injectable 25 Gram(s) IV Push once  ertapenem  IVPB 1000 milliGRAM(s) IV Intermittent once  glucagon  Injectable 1 milliGRAM(s) IntraMuscular once  indomethacin Suppository 100 milliGRAM(s) Rectal once  insulin glargine Injectable (LANTUS) 10 Unit(s) SubCutaneous at bedtime  insulin lispro (ADMELOG) corrective regimen sliding scale   SubCutaneous three times a day before meals  naloxone Injectable 0.4 milliGRAM(s) IV Push once  polyethylene glycol 3350 17 Gram(s) Oral daily  senna 2 Tablet(s) Oral at bedtime  sertraline 50 milliGRAM(s) Oral daily  sodium chloride 0.9%. 1000 milliLiter(s) (75 mL/Hr) IV Continuous <Continuous>    MEDICATIONS  (PRN):  albuterol    90 MICROgram(s) HFA Inhaler 2 Puff(s) Inhalation every 6 hours PRN for shortness of breath and/or wheezing  aluminum hydroxide/magnesium hydroxide/simethicone Suspension 30 milliLiter(s) Oral every 4 hours PRN Dyspepsia  bisacodyl 5 milliGRAM(s) Oral daily PRN Constipation  dextrose Oral Gel 15 Gram(s) Oral once PRN Blood Glucose LESS THAN 70 milliGRAM(s)/deciliter  diphenhydrAMINE 25 milliGRAM(s) Oral every 6 hours PRN Rash and/or Itching  HYDROmorphone  Injectable 0.2 milliGRAM(s) IV Push every 4 hours PRN Moderate Pain (4 - 6)  HYDROmorphone  Injectable 0.5 milliGRAM(s) IV Push every 4 hours PRN Severe Pain (7 - 10)  HYDROmorphone  Injectable 0.2 milliGRAM(s) IV Push every 4 hours PRN For Breakthrough pain  melatonin 3 milliGRAM(s) Oral at bedtime PRN Insomnia  methocarbamol 500 milliGRAM(s) Oral two times a day PRN Muscle Spasm  ondansetron Injectable 4 milliGRAM(s) IV Push every 8 hours PRN Nausea and/or Vomiting    ALLERGIES:  azithromycin (Unknown)  Pepcid (Hives; Rash)  Januvia (Rash)    T(C): 37.2 (05-22-25 @ 05:00), Max: 37.2 (05-22-25 @ 05:00)  HR: 90 (05-22-25 @ 05:00) (90 - 106)  BP: 125/75 (05-22-25 @ 05:00) (124/85 - 160/75)  RR: 18 (05-22-25 @ 05:00) (18 - 20)  SpO2: 95% (05-22-25 @ 05:00) (95% - 98%)      PHYSICAL EXAM:    Constitutional: No acute distress noted   Eye: icteric   Neurological: alert, awake  Respiratory: No increased effort  Cardiovascular: S1S2  Gastrointestinal: Soft, nontender, non distended, +BS  Extremities: No edema  Skin: + jaundice  Psychiatric: Calm, cooperative      LABS:             9.8    11.13 )-----------( 397      ( 05-22 @ 03:34 )             32.2                10.3   11.70 )-----------( 403      ( 05-21 @ 21:09 )             33.3       PT/INR - ( 21 May 2025 21:09 )   PT: 16.0 sec;   INR: 1.38 ratio         PTT - ( 21 May 2025 21:09 )  PTT:36.8 sec  05-22    130[L]  |  91[L]  |  17.2  ----------------------------<  91  3.4[L]   |  25.0  |  0.68    Ca    10.8[H]      22 May 2025 03:34    TPro  5.8[L]  /  Alb  2.6[L]  /  TBili  9.6[H]  /  DBili  8.5[H]  /  AST  299[H]  /  ALT  164[H]  /  AlkPhos  2324[H]  05-22    LIVER FUNCTIONS - ( 22 May 2025 03:34 )  Alb: 2.6 g/dL / Pro: 5.8 g/dL / ALK PHOS: 2324 U/L / ALT: 164 U/L / AST: 299 U/L / GGT: x             Lipase: 14 U/L (05-21-25 @ 21:09)      Urinalysis Basic - ( 22 May 2025 03:34 )    Color: x / Appearance: x / SG: x / pH: x  Gluc: 91 mg/dL / Ketone: x  / Bili: x / Urobili: x   Blood: x / Protein: x / Nitrite: x   Leuk Esterase: x / RBC: x / WBC x   Sq Epi: x / Non Sq Epi: x / Bacteria: x        Urinalysis with Rflx Culture (collected 22 May 2025 00:45)    < from: CT Abdomen and Pelvis w/ IV Cont (05.22.25 @ 01:09) >  COMPARISON:  CT ABDOMEN AND PELVIS WITH IV CONTRAST 4/28/2025 9:21 PM    FINDINGS:  Pleural spaces: Mild-to-moderate right pleural effusion with underlying  atelectasis and/or infiltrate.    Liver:Hepatomegaly, measuring 22 cm in cranial to caudal dimension.  Gallbladder and biliary ducts: Collapsed gallbladder. Moderate intra and  extrahepatic biliary ductal dilation , new from prior study, with abrupt   cutoff  in the CBD at the hilar with soft tissue thickening in this region,   measuring  up to 4 x 4 cm, concerning for an enlarged lymph node  Pancreas: Normal. No ductal dilation.  Spleen: Multiple hypodense splenic lesions, increased since prior study,  concerning for metastases. Splenomegaly, measuring approximately 14.6 cm   in cranial to caudal dimension.  Adrenal glands: Normal. No mass.  Kidneys and ureters: Normal. No hydronephrosis.  Stomach and bowel: Stomach, small bowel loops normal. No bowel   obstruction.  Colonic diverticulosis.  Appendix: No evidence of appendicitis.    Intraperitoneal space: Trace free fluid in pelvis.  Vasculature: Atherosclerotic aortic disease without aneurysm.  Lymph nodes: Enlarged retroperitoneal lymph masses, measuring up to 6 cm,  increased since prior study, concerning for metastases, lymphoma ,   leukemia .  Partially visualized enlarged right inguinal lymph node measuring 1.5 cm.  Enlarged retrocrural lymph nodes.  Urinary bladder: Urinary bladder wall thickening may be due to cystitis.   Tiny  air locule in the urinary bladder may be due to infectious or   intervention.  Reproductive: Unremarkable as visualized.  Bones/joints: Degenerate changes in thoracolumbar spine. No acute   fracture.  Soft tissues: Unremarkable.    IMPRESSION:  1.   Moderate intra and extrahepatic biliary ductal dilation , new from   prior  study, with abrupt cutoff in the CBD at the hilar with soft tissue   thickening  in this region, measuring up to 4 x 4 cm, concerning for lymph node mass.  2.   Enlarged retroperitoneal , retrocrural lymph masses, measuring up to   6 cm,  increased since prior study, concerning for metastases, lymphoma,   leukemia .  3.   Urinary bladder wall thickening may be due to cystitis. Tiny air   locule in  the urinarybladder may be due to infectious or intervention.  4.   Mild-to-moderate right pleural effusion with underlying atelectasis   and/or  infiltrate.  5.  Hepatosplenomegaly with multiple splenic lesions, suspicious for   metastasis.    < end of copied text >    < from: US Abdomen Upper Quadrant Right (05.21.25 @ 23:44) >  FINDINGS:  Liver: Increased echogenicity.  Bile ducts: Dilated. Common bile duct measures 14 mm.  Gallbladder: Not well-visualized.  Pancreas: Visualized portions are within normal limits.  Right kidney: 9.9 cm. No hydronephrosis.  Ascites: None.  IVC: Not well-visualized.    Within the kristy hepatis, there is a multi cystic lesion measuring 10.9 x   8.7 x 8.4 cm again seen.    IMPRESSION:  Multicystic lesion within the kristy hepatis measuring 10.9 x 8.7 x 8.4   cm, likely corresponding to lymphadenopathy seen on prior CT.    Gallbladder is not well visualized. Redemonstrated dilated common bile   duct.    Hepatic steatosis.    < end of copied text >

## 2025-05-22 NOTE — DISCHARGE NOTE PROVIDER - ATTENDING DISCHARGE PHYSICAL EXAMINATION:
Constitutional: Alert, NAD, comfortable   Head and Face: Atraumatic, head and face were normal in appearance  Eyes: +Sclera icterus   ENT: Ears and nose were normal in appearance, tongue moist  Neck: Appearance was normal, neck was supple, No JVD  Pulmonary: Clear to auscultation bilaterally, no rales/crackles, or wheezing  Heart: Regular rate and rhythm, no murmurs, gallops, or pericardial rubs  Abdominal: Soft, nontender, nondistended. No appreciable hepatosplenomegaly. Bowel sounds normal  Skin: +Jaundice   Extremities: Warm without edema. No clubbing.  Pulse: 2+ radial pulse  Neuro: Oriented to person, place, and time, no motor or sensory deficit

## 2025-05-22 NOTE — DISCHARGE NOTE PROVIDER - NSDCFUSCHEDAPPT_GEN_ALL_CORE_FT
Sharron Justice  [facility]  [facility]  Scheduled Appointment: 05/28/2025    [facility]  [address]  [address]  Scheduled Appointment: 05/28/2025    [facility]  [facility]  Scheduled Appointment: 05/28/2025    Rk Roberson  [facility]  [address]  Scheduled Appointment: 06/19/2025

## 2025-05-22 NOTE — H&P ADULT - HISTORY OF PRESENT ILLNESS
54F with PMHx of  54F with PMHx of Diffuse large B-cell lymphoma, presenting with progressive weakness, jaundice, N/V and poor intake x 1 week. Pt reports she recently found out she has metastatic disease and confirm biopsy pathology and has appointment coming up with [facility] oncology. Pt also reports diarrhea and dark urine. Pt has not had a BM for past 2 days and has not passed gas since yesterday. During exam pt think she might want to have a BM, bowel sound present on all 4 quadrants on exam. GI and Heme/Onc consulted.

## 2025-05-22 NOTE — CONSULT NOTE ADULT - ASSESSMENT
54F with PMHx of Diffuse large B-cell lymphoma (diagnosed November 2024, s/p biopsy) follows with Dr. Thompson at [facility] scheduled to have PET scan and port placed this week presented to ED with jaundice. Admitted with obstructive jaundice.    Obstructive jaundice   B cell lymphoma    CT abd/ US abd as above. Elevated liver enzymes.   CT abd (05/22/25): shows moderate intra and extrahepatic biliary ductal dilation , new from prior study, with abrupt cutoff in the CBD at the hilar with soft tissue thickening in this region, measuring up to 4 x 4 cm, concerning for lymph node mass. Enlarged retroperitoneal , retrocrural lymph masses, measuring up to 6 cm, increased since prior study, concerning for metastases, lymphoma, leukemia .  US abd (05/21/25):  Increased echogenicity. Bile ducts: Dilated. Common bile duct measures 14 mm.Multicystic lesion within the kristy hepatis measuring 10.9 x 8.7 x 8.4 cm  T bili 10, ALP 2400, / .    Will  review imaging with intervention radiologist  -Trend liver chemistry, coag, renal function  -Will plan for ERCP today  - Please keep NPO  -Type and screen ordered. Pre procedure ordered   -Hematology consult  Plan d/w the patient      54F with PMHx of Diffuse large B-cell lymphoma (diagnosed November 2024, s/p biopsy) follows with Dr. Thompson at [facility] scheduled to have PET scan and port placed this week presented to ED with jaundice. Admitted with obstructive jaundice.    Obstructive jaundice   B cell lymphoma    CT abd/ US abd as above. Elevated liver enzymes.   CT abd (05/22/25): shows moderate intra and extrahepatic biliary ductal dilation , new from prior study, with abrupt cutoff in the CBD at the hilar with soft tissue thickening in this region, measuring up to 4 x 4 cm, concerning for lymph node mass. Enlarged retroperitoneal , retrocrural lymph masses, measuring up to 6 cm, increased since prior study, concerning for metastases, lymphoma, leukemia .  US abd (05/21/25):  Increased echogenicity. Bile ducts: Dilated. Common bile duct measures 14 mm.Multicystic lesion within the kristy hepatis measuring 10.9 x 8.7 x 8.4 cm  T bili 10, ALP 2400, / .    Imaging reviewed with intervention radiologist  -Trend liver chemistry, coag, renal function  -Will plan for ERCP today  - Please keep NPO  -Type and screen ordered. Pre procedure ordered   -Hematology consult  Plan d/w the patient     Addendum:  Case d/w hematologist Dr. Thompson, updated plan as below:  -Will plan for immunomodulator therapy   -No indication for immediate ERCP/ stent placement at this time  -Patient can resume diet today  Plan D/w the patient and hospitalist attending Dr. Victoria             54F with PMHx of Diffuse large B-cell lymphoma (diagnosed November 2024, s/p biopsy) follows with Dr. Justice at [facility] scheduled to have PET scan and port placed this week presented to ED with jaundice. Admitted with obstructive jaundice.    Obstructive jaundice   B cell lymphoma    CT abd/ US abd as above. Elevated liver enzymes.   CT abd (05/22/25): shows moderate intra and extrahepatic biliary ductal dilation , new from prior study, with abrupt cutoff in the CBD at the hilar with soft tissue thickening in this region, measuring up to 4 x 4 cm, concerning for lymph node mass. Enlarged retroperitoneal , retrocrural lymph masses, measuring up to 6 cm, increased since prior study, concerning for metastases, lymphoma, leukemia .  US abd (05/21/25):  Increased echogenicity. Bile ducts: Dilated. Common bile duct measures 14 mm.Multicystic lesion within the kristy hepatis measuring 10.9 x 8.7 x 8.4 cm  T bili 10, ALP 2400, / .    Imaging reviewed with intervention radiologist  -Trend liver chemistry, coag, renal function  -Will plan for ERCP today  - Please keep NPO  -Type and screen ordered. Pre procedure ordered   -Hematology consult  Plan d/w the patient     Addendum:  Case d/w hematologist Dr. Justice, updated plan as below:  -Will plan for immunomodulator therapy   -No indication for immediate ERCP/ stent placement at this time  -Patient can resume diet today  Plan D/w the patient and hospitalist attending Dr. Milian

## 2025-05-22 NOTE — PATIENT PROFILE ADULT - FALL HARM RISK - RISK INTERVENTIONS
Assistance OOB with selected safe patient handling equipment/Assistance with ambulation/Communicate Fall Risk and Risk Factors to all staff, patient, and family/Monitor for mental status changes/Monitor gait and stability/Reinforce activity limits and safety measures with patient and family/Sit up slowly, dangle for a short time, stand at bedside before walking/Toileting schedule using arm’s reach rule for commode and bathroom/Use of alarms - bed, chair and/or voice tab/Visual Cue: Yellow wristband/Bed in lowest position, wheels locked, appropriate side rails in place/Call bell, personal items and telephone in reach/Instruct patient to call for assistance before getting out of bed or chair/Non-slip footwear when patient is out of bed/[place] to call system/Physically safe environment - no spills, clutter or unnecessary equipment/Purposeful Proactive Rounding/Room/bathroom lighting operational, light cord in reach

## 2025-05-22 NOTE — CONSULT NOTE ADULT - ASSESSMENT
54F with a recent diagnosis of Diffuse large B-cell lymphoma who was to be seen by Dr Justice on 5/28/25 now presenting with progressive weakness, jaundice, N/V and poor intake x 1 week.     CT ABD; < from: CT Abdomen and Pelvis w/ IV Cont (05.22.25 @ 01:09) >  - Hepatomegaly, measuring 22 cm   - Gallbladder and biliary ducts: Collapsed gallbladder. Moderate intra and extrahepatic biliary ductal dilation , new from prior study, with abrupt  cutoff in the CBD at the hilar with soft tissue thickening in this region,   measuring 4 x 4 cm, concerning for an enlarged lymph node  - Multiple hypodense splenic lesions, concerning for metastases. Splenomegaly, measuring approximately 14.6 cm   - Urinary bladder wall thickening may be due to cystitis.     # DLBCL No germinal center   - Worsening disese since last scan 3 weeks ago   - T bili  at 10, with Direct component at 8.5  with extrnisic compression of CBD likely from LN. ALk phos 2400, AST / / 164   - send LDH/ Uric acid  - GI evaluated patient   - D/w patient and  César need to start treatment and potential need to transfer to [facility] pending bed availability   INPROGERSS    54F with a recent diagnosis of Diffuse large B-cell lymphoma who was to be seen by Dr Justice on 5/28/25 now presenting with progressive weakness, jaundice, N/V and poor intake x 1 week.     CT ABD; < from: CT Abdomen and Pelvis w/ IV Cont (05.22.25 @ 01:09) >  - Hepatomegaly, measuring 22 cm   - Gallbladder and biliary ducts: Collapsed gallbladder. Moderate intra and extrahepatic biliary ductal dilation , new from prior study, with abrupt  cutoff in the CBD at the hilar with soft tissue thickening in this region,   measuring 4 x 4 cm, concerning for an enlarged lymph node  - Multiple hypodense splenic lesions, concerning for metastases. Splenomegaly, measuring approximately 14.6 cm   - Urinary bladder wall thickening may be due to cystitis.     # DLBCL No germinal center   - Worsening disese since last scan 3 weeks ago   - T bili  at 10, with Direct component at 8.5  with extrnisic compression of CBD likely from LN. ALk phos 2400, AST / / 164   - send LDH/ Uric acid, hepatitis  - GI evaluated patient   - D/w patient and  César need to start treatment and potential need to transfer to [facility] pending bed availability

## 2025-05-23 ENCOUNTER — APPOINTMENT (OUTPATIENT)
Dept: NUCLEAR MEDICINE | Facility: CLINIC | Age: 55
End: 2025-05-23

## 2025-05-23 ENCOUNTER — TRANSCRIPTION ENCOUNTER (OUTPATIENT)
Age: 55
End: 2025-05-23

## 2025-05-23 DIAGNOSIS — Z51.5 ENCOUNTER FOR PALLIATIVE CARE: ICD-10-CM

## 2025-05-23 DIAGNOSIS — G89.3 NEOPLASM RELATED PAIN (ACUTE) (CHRONIC): ICD-10-CM

## 2025-05-23 LAB
ALBUMIN SERPL ELPH-MCNC: 2.9 G/DL — LOW (ref 3.3–5)
ALP SERPL-CCNC: 2465 U/L — HIGH (ref 40–120)
ALP SERPL-CCNC: 2472 U/L — HIGH (ref 40–120)
ALP SERPL-CCNC: 2478 U/L — HIGH (ref 40–120)
ALT FLD-CCNC: 161 U/L — HIGH (ref 10–45)
ALT FLD-CCNC: 177 U/L — HIGH (ref 10–45)
ALT FLD-CCNC: 182 U/L — HIGH (ref 10–45)
ANION GAP SERPL CALC-SCNC: 13 MMOL/L — SIGNIFICANT CHANGE UP (ref 5–17)
ANION GAP SERPL CALC-SCNC: 14 MMOL/L — SIGNIFICANT CHANGE UP (ref 5–17)
ANION GAP SERPL CALC-SCNC: 15 MMOL/L — SIGNIFICANT CHANGE UP (ref 5–17)
ANISOCYTOSIS BLD QL: SIGNIFICANT CHANGE UP
APTT BLD: 34.8 SEC — SIGNIFICANT CHANGE UP (ref 26.1–36.8)
AST SERPL-CCNC: 229 U/L — HIGH (ref 10–40)
AST SERPL-CCNC: 308 U/L — HIGH (ref 10–40)
AST SERPL-CCNC: 333 U/L — HIGH (ref 10–40)
BASOPHILS # BLD AUTO: 0.09 K/UL — SIGNIFICANT CHANGE UP (ref 0–0.2)
BASOPHILS NFR BLD AUTO: 0.9 % — SIGNIFICANT CHANGE UP (ref 0–2)
BILIRUB SERPL-MCNC: 11.2 MG/DL — HIGH (ref 0.2–1.2)
BILIRUB SERPL-MCNC: 12.2 MG/DL — HIGH (ref 0.2–1.2)
BILIRUB SERPL-MCNC: 9.2 MG/DL — HIGH (ref 0.2–1.2)
BUN SERPL-MCNC: 18 MG/DL — SIGNIFICANT CHANGE UP (ref 7–23)
BUN SERPL-MCNC: 21 MG/DL — SIGNIFICANT CHANGE UP (ref 7–23)
BUN SERPL-MCNC: 22 MG/DL — SIGNIFICANT CHANGE UP (ref 7–23)
CA-I BLD-SCNC: 1.49 MMOL/L — HIGH (ref 1.15–1.33)
CALCIUM SERPL-MCNC: 10.7 MG/DL — HIGH (ref 8.4–10.5)
CALCIUM SERPL-MCNC: 10.9 MG/DL — HIGH (ref 8.4–10.5)
CALCIUM SERPL-MCNC: 11 MG/DL — HIGH (ref 8.4–10.5)
CHLORIDE SERPL-SCNC: 92 MMOL/L — LOW (ref 96–108)
CHLORIDE SERPL-SCNC: 93 MMOL/L — LOW (ref 96–108)
CHLORIDE SERPL-SCNC: 95 MMOL/L — LOW (ref 96–108)
CO2 SERPL-SCNC: 21 MMOL/L — LOW (ref 22–31)
CO2 SERPL-SCNC: 22 MMOL/L — SIGNIFICANT CHANGE UP (ref 22–31)
CO2 SERPL-SCNC: 23 MMOL/L — SIGNIFICANT CHANGE UP (ref 22–31)
CREAT SERPL-MCNC: 0.82 MG/DL — SIGNIFICANT CHANGE UP (ref 0.5–1.3)
CREAT SERPL-MCNC: 0.83 MG/DL — SIGNIFICANT CHANGE UP (ref 0.5–1.3)
CREAT SERPL-MCNC: 0.95 MG/DL — SIGNIFICANT CHANGE UP (ref 0.5–1.3)
CULTURE RESULTS: SIGNIFICANT CHANGE UP
EGFR: 71 ML/MIN/1.73M2 — SIGNIFICANT CHANGE UP
EGFR: 71 ML/MIN/1.73M2 — SIGNIFICANT CHANGE UP
EGFR: 84 ML/MIN/1.73M2 — SIGNIFICANT CHANGE UP
EGFR: 84 ML/MIN/1.73M2 — SIGNIFICANT CHANGE UP
EGFR: 85 ML/MIN/1.73M2 — SIGNIFICANT CHANGE UP
EGFR: 85 ML/MIN/1.73M2 — SIGNIFICANT CHANGE UP
ELLIPTOCYTES BLD QL SMEAR: SLIGHT — SIGNIFICANT CHANGE UP
EOSINOPHIL # BLD AUTO: 0.34 K/UL — SIGNIFICANT CHANGE UP (ref 0–0.5)
EOSINOPHIL NFR BLD AUTO: 3.4 % — SIGNIFICANT CHANGE UP (ref 0–6)
FIBRINOGEN PPP-MCNC: 585 MG/DL — HIGH (ref 200–445)
GIANT PLATELETS BLD QL SMEAR: PRESENT — SIGNIFICANT CHANGE UP
GLUCOSE BLDC GLUCOMTR-MCNC: 132 MG/DL — HIGH (ref 70–99)
GLUCOSE BLDC GLUCOMTR-MCNC: 204 MG/DL — HIGH (ref 70–99)
GLUCOSE BLDC GLUCOMTR-MCNC: 95 MG/DL — SIGNIFICANT CHANGE UP (ref 70–99)
GLUCOSE BLDC GLUCOMTR-MCNC: 99 MG/DL — SIGNIFICANT CHANGE UP (ref 70–99)
GLUCOSE SERPL-MCNC: 101 MG/DL — HIGH (ref 70–99)
GLUCOSE SERPL-MCNC: 186 MG/DL — HIGH (ref 70–99)
GLUCOSE SERPL-MCNC: 94 MG/DL — SIGNIFICANT CHANGE UP (ref 70–99)
HBV CORE IGM SER-ACNC: SIGNIFICANT CHANGE UP
HBV CORE IGM SER-ACNC: SIGNIFICANT CHANGE UP
HBV SURFACE AB SER-ACNC: <3.3 MIU/ML — LOW
HBV SURFACE AG SER-ACNC: SIGNIFICANT CHANGE UP
HCT VFR BLD CALC: 33.2 % — LOW (ref 34.5–45)
HCV AB S/CO SERPL IA: 0.06 S/CO — SIGNIFICANT CHANGE UP
HCV AB SERPL-IMP: SIGNIFICANT CHANGE UP
HGB BLD-MCNC: 10 G/DL — LOW (ref 11.5–15.5)
HIV 1+2 AB+HIV1 P24 AG SERPL QL IA: SIGNIFICANT CHANGE UP
HYPOCHROMIA BLD QL: SIGNIFICANT CHANGE UP
INR BLD: 1.44 RATIO — HIGH (ref 0.85–1.16)
LDH SERPL L TO P-CCNC: 266 U/L — HIGH (ref 50–242)
LDH SERPL L TO P-CCNC: 275 U/L — HIGH (ref 50–242)
LDH SERPL L TO P-CCNC: 313 U/L — HIGH (ref 50–242)
LYMPHOCYTES # BLD AUTO: 0.51 K/UL — LOW (ref 1–3.3)
LYMPHOCYTES # BLD AUTO: 5.1 % — LOW (ref 13–44)
MACROCYTES BLD QL: SLIGHT — SIGNIFICANT CHANGE UP
MAGNESIUM SERPL-MCNC: 1.9 MG/DL — SIGNIFICANT CHANGE UP (ref 1.6–2.6)
MAGNESIUM SERPL-MCNC: 2 MG/DL — SIGNIFICANT CHANGE UP (ref 1.6–2.6)
MANUAL SMEAR VERIFICATION: SIGNIFICANT CHANGE UP
MCHC RBC-ENTMCNC: 21.8 PG — LOW (ref 27–34)
MCHC RBC-ENTMCNC: 30.1 G/DL — LOW (ref 32–36)
MCV RBC AUTO: 72.5 FL — LOW (ref 80–100)
MICROCYTES BLD QL: SLIGHT — SIGNIFICANT CHANGE UP
MONOCYTES # BLD AUTO: 0.86 K/UL — SIGNIFICANT CHANGE UP (ref 0–0.9)
MONOCYTES NFR BLD AUTO: 8.5 % — SIGNIFICANT CHANGE UP (ref 2–14)
NEUTROPHILS # BLD AUTO: 8.26 K/UL — HIGH (ref 1.8–7.4)
NEUTROPHILS NFR BLD AUTO: 81.2 % — HIGH (ref 43–77)
NEUTS BAND # BLD: 0.9 % — SIGNIFICANT CHANGE UP (ref 0–8)
NEUTS BAND NFR BLD: 0.9 % — SIGNIFICANT CHANGE UP (ref 0–8)
PHOSPHATE SERPL-MCNC: 3 MG/DL — SIGNIFICANT CHANGE UP (ref 2.5–4.5)
PHOSPHATE SERPL-MCNC: 3.3 MG/DL — SIGNIFICANT CHANGE UP (ref 2.5–4.5)
PLAT MORPH BLD: ABNORMAL
PLATELET # BLD AUTO: 421 K/UL — HIGH (ref 150–400)
POIKILOCYTOSIS BLD QL AUTO: SLIGHT — SIGNIFICANT CHANGE UP
POLYCHROMASIA BLD QL SMEAR: SLIGHT — SIGNIFICANT CHANGE UP
POTASSIUM SERPL-MCNC: 3.7 MMOL/L — SIGNIFICANT CHANGE UP (ref 3.5–5.3)
POTASSIUM SERPL-MCNC: 3.8 MMOL/L — SIGNIFICANT CHANGE UP (ref 3.5–5.3)
POTASSIUM SERPL-MCNC: 3.8 MMOL/L — SIGNIFICANT CHANGE UP (ref 3.5–5.3)
POTASSIUM SERPL-SCNC: 3.7 MMOL/L — SIGNIFICANT CHANGE UP (ref 3.5–5.3)
POTASSIUM SERPL-SCNC: 3.8 MMOL/L — SIGNIFICANT CHANGE UP (ref 3.5–5.3)
POTASSIUM SERPL-SCNC: 3.8 MMOL/L — SIGNIFICANT CHANGE UP (ref 3.5–5.3)
PROT SERPL-MCNC: 6.1 G/DL — SIGNIFICANT CHANGE UP (ref 6–8.3)
PROT SERPL-MCNC: 6.1 G/DL — SIGNIFICANT CHANGE UP (ref 6–8.3)
PROT SERPL-MCNC: 6.2 G/DL — SIGNIFICANT CHANGE UP (ref 6–8.3)
PROTHROM AB SERPL-ACNC: 16.4 SEC — HIGH (ref 9.9–13.4)
RBC # BLD: 4.44 M/UL — SIGNIFICANT CHANGE UP (ref 3.8–5.2)
RBC # BLD: 4.58 M/UL — SIGNIFICANT CHANGE UP (ref 3.8–5.2)
RBC # FLD: 27.5 % — HIGH (ref 10.3–14.5)
RBC BLD AUTO: ABNORMAL
RETICS #: 89.2 K/UL — SIGNIFICANT CHANGE UP (ref 25–125)
RETICS/RBC NFR: 2 % — SIGNIFICANT CHANGE UP (ref 0.5–2.5)
SODIUM SERPL-SCNC: 129 MMOL/L — LOW (ref 135–145)
SODIUM SERPL-SCNC: 129 MMOL/L — LOW (ref 135–145)
SODIUM SERPL-SCNC: 130 MMOL/L — LOW (ref 135–145)
SPECIMEN SOURCE: SIGNIFICANT CHANGE UP
TARGETS BLD QL SMEAR: SIGNIFICANT CHANGE UP
URATE SERPL-MCNC: 3.2 MG/DL — SIGNIFICANT CHANGE UP (ref 2.5–7)
URATE SERPL-MCNC: 4.3 MG/DL — SIGNIFICANT CHANGE UP (ref 2.5–7)
URATE SERPL-MCNC: 4.8 MG/DL — SIGNIFICANT CHANGE UP (ref 2.5–7)
WBC # BLD: 10.06 K/UL — SIGNIFICANT CHANGE UP (ref 3.8–10.5)
WBC # FLD AUTO: 10.06 K/UL — SIGNIFICANT CHANGE UP (ref 3.8–10.5)

## 2025-05-23 PROCEDURE — 71250 CT THORAX DX C-: CPT | Mod: 26

## 2025-05-23 PROCEDURE — 99223 1ST HOSP IP/OBS HIGH 75: CPT

## 2025-05-23 PROCEDURE — 78472 GATED HEART PLANAR SINGLE: CPT | Mod: 26

## 2025-05-23 PROCEDURE — 99233 SBSQ HOSP IP/OBS HIGH 50: CPT

## 2025-05-23 PROCEDURE — 83020 HEMOGLOBIN ELECTROPHORESIS: CPT | Mod: 26

## 2025-05-23 RX ORDER — DIPHENHYDRAMINE HCL 12.5MG/5ML
25 ELIXIR ORAL ONCE
Refills: 0 | Status: COMPLETED | OUTPATIENT
Start: 2025-05-23 | End: 2025-05-23

## 2025-05-23 RX ORDER — DIPHENHYDRAMINE HCL 12.5MG/5ML
50 ELIXIR ORAL ONCE
Refills: 0 | Status: COMPLETED | OUTPATIENT
Start: 2025-05-23 | End: 2025-05-23

## 2025-05-23 RX ORDER — OXYCODONE HYDROCHLORIDE 30 MG/1
10 TABLET ORAL EVERY 8 HOURS
Refills: 0 | Status: DISCONTINUED | OUTPATIENT
Start: 2025-05-23 | End: 2025-05-27

## 2025-05-23 RX ORDER — AMLODIPINE BESYLATE 10 MG/1
10 TABLET ORAL DAILY
Refills: 0 | Status: DISCONTINUED | OUTPATIENT
Start: 2025-05-23 | End: 2025-05-27

## 2025-05-23 RX ORDER — HYDROMORPHONE/SOD CHLOR,ISO/PF 2 MG/10 ML
0.2 SYRINGE (ML) INJECTION EVERY 4 HOURS
Refills: 0 | Status: DISCONTINUED | OUTPATIENT
Start: 2025-05-23 | End: 2025-05-23

## 2025-05-23 RX ORDER — HYDROMORPHONE/SOD CHLOR,ISO/PF 2 MG/10 ML
0.5 SYRINGE (ML) INJECTION
Refills: 0 | Status: DISCONTINUED | OUTPATIENT
Start: 2025-05-23 | End: 2025-05-27

## 2025-05-23 RX ORDER — HYDROMORPHONE/SOD CHLOR,ISO/PF 2 MG/10 ML
0.2 SYRINGE (ML) INJECTION
Refills: 0 | Status: DISCONTINUED | OUTPATIENT
Start: 2025-05-23 | End: 2025-05-27

## 2025-05-23 RX ORDER — RITUXIMAB-PVVR 100 MG/10ML
844 INJECTION, SOLUTION INTRAVENOUS ONCE
Refills: 0 | Status: COMPLETED | OUTPATIENT
Start: 2025-05-23 | End: 2025-05-23

## 2025-05-23 RX ORDER — IPRATROPIUM BROMIDE AND ALBUTEROL SULFATE .5; 2.5 MG/3ML; MG/3ML
3 SOLUTION RESPIRATORY (INHALATION) EVERY 6 HOURS
Refills: 0 | Status: DISCONTINUED | OUTPATIENT
Start: 2025-05-23 | End: 2025-05-27

## 2025-05-23 RX ORDER — CYCLOPHOSPHAMIDE INJECTION, SOLUTION 200 MG/ML
2250 INJECTION INTRAVENOUS ONCE
Refills: 0 | Status: COMPLETED | OUTPATIENT
Start: 2025-05-23 | End: 2025-05-23

## 2025-05-23 RX ORDER — IPRATROPIUM BROMIDE AND ALBUTEROL SULFATE .5; 2.5 MG/3ML; MG/3ML
3 SOLUTION RESPIRATORY (INHALATION) ONCE
Refills: 0 | Status: COMPLETED | OUTPATIENT
Start: 2025-05-23 | End: 2025-05-23

## 2025-05-23 RX ORDER — ACETAMINOPHEN 500 MG/5ML
650 LIQUID (ML) ORAL ONCE
Refills: 0 | Status: COMPLETED | OUTPATIENT
Start: 2025-05-23 | End: 2025-05-23

## 2025-05-23 RX ORDER — FILGRASTIM 300 UG/.5ML
480 INJECTION, SOLUTION INTRAVENOUS; SUBCUTANEOUS DAILY
Refills: 0 | Status: DISCONTINUED | OUTPATIENT
Start: 2025-05-24 | End: 2025-05-27

## 2025-05-23 RX ORDER — ONDANSETRON HCL/PF 4 MG/2 ML
16 VIAL (ML) INJECTION ONCE
Refills: 0 | Status: COMPLETED | OUTPATIENT
Start: 2025-05-23 | End: 2025-05-23

## 2025-05-23 RX ORDER — HYDROMORPHONE/SOD CHLOR,ISO/PF 2 MG/10 ML
0.5 SYRINGE (ML) INJECTION EVERY 4 HOURS
Refills: 0 | Status: DISCONTINUED | OUTPATIENT
Start: 2025-05-23 | End: 2025-05-23

## 2025-05-23 RX ORDER — METOCLOPRAMIDE HCL 10 MG
10 TABLET ORAL EVERY 6 HOURS
Refills: 0 | Status: DISCONTINUED | OUTPATIENT
Start: 2025-05-23 | End: 2025-05-27

## 2025-05-23 RX ORDER — HYDROCORTISONE 20 MG
100 TABLET ORAL ONCE
Refills: 0 | Status: COMPLETED | OUTPATIENT
Start: 2025-05-23 | End: 2025-05-23

## 2025-05-23 RX ADMIN — CYCLOPHOSPHAMIDE INJECTION, SOLUTION 2250 MILLIGRAM(S): 200 INJECTION INTRAVENOUS at 23:53

## 2025-05-23 RX ADMIN — Medication 0.5 MILLIGRAM(S): at 19:50

## 2025-05-23 RX ADMIN — Medication 650 MILLIGRAM(S): at 15:32

## 2025-05-23 RX ADMIN — Medication 100 MILLIGRAM(S): at 15:31

## 2025-05-23 RX ADMIN — INSULIN GLARGINE-YFGN 10 UNIT(S): 100 INJECTION, SOLUTION SUBCUTANEOUS at 21:58

## 2025-05-23 RX ADMIN — Medication 150 MILLILITER(S): at 14:16

## 2025-05-23 RX ADMIN — Medication 5 MILLIGRAM(S): at 17:30

## 2025-05-23 RX ADMIN — Medication 0.5 MILLIGRAM(S): at 14:15

## 2025-05-23 RX ADMIN — Medication 0.5 MILLIGRAM(S): at 18:27

## 2025-05-23 RX ADMIN — PREDNISONE 100 MILLIGRAM(S): 20 TABLET ORAL at 14:15

## 2025-05-23 RX ADMIN — AMLODIPINE BESYLATE 10 MILLIGRAM(S): 10 TABLET ORAL at 17:08

## 2025-05-23 RX ADMIN — Medication 4 MILLIGRAM(S): at 05:45

## 2025-05-23 RX ADMIN — Medication 5 MILLIGRAM(S): at 14:15

## 2025-05-23 RX ADMIN — Medication 4 MILLIGRAM(S): at 08:47

## 2025-05-23 RX ADMIN — Medication 4 MILLIGRAM(S): at 00:31

## 2025-05-23 RX ADMIN — IPRATROPIUM BROMIDE AND ALBUTEROL SULFATE 3 MILLILITER(S): .5; 2.5 SOLUTION RESPIRATORY (INHALATION) at 23:19

## 2025-05-23 RX ADMIN — IPRATROPIUM BROMIDE AND ALBUTEROL SULFATE 3 MILLILITER(S): .5; 2.5 SOLUTION RESPIRATORY (INHALATION) at 07:55

## 2025-05-23 RX ADMIN — Medication 75 MILLILITER(S): at 10:04

## 2025-05-23 RX ADMIN — HEPARIN SODIUM 5000 UNIT(S): 1000 INJECTION INTRAVENOUS; SUBCUTANEOUS at 14:14

## 2025-05-23 RX ADMIN — Medication 4 MILLIGRAM(S): at 01:36

## 2025-05-23 RX ADMIN — IPRATROPIUM BROMIDE AND ALBUTEROL SULFATE 3 MILLILITER(S): .5; 2.5 SOLUTION RESPIRATORY (INHALATION) at 16:11

## 2025-05-23 RX ADMIN — Medication 0.5 MILLIGRAM(S): at 22:10

## 2025-05-23 RX ADMIN — RITUXIMAB-PVVR 844 MILLIGRAM(S): 100 INJECTION, SOLUTION INTRAVENOUS at 16:23

## 2025-05-23 RX ADMIN — Medication 4 MILLIGRAM(S): at 08:36

## 2025-05-23 RX ADMIN — Medication 40 MILLIGRAM(S): at 07:58

## 2025-05-23 RX ADMIN — Medication 25 MILLIGRAM(S): at 05:05

## 2025-05-23 RX ADMIN — IPRATROPIUM BROMIDE AND ALBUTEROL SULFATE 3 MILLILITER(S): .5; 2.5 SOLUTION RESPIRATORY (INHALATION) at 02:06

## 2025-05-23 RX ADMIN — POLYETHYLENE GLYCOL 3350 17 GRAM(S): 17 POWDER, FOR SOLUTION ORAL at 14:15

## 2025-05-23 RX ADMIN — SERTRALINE 50 MILLIGRAM(S): 100 TABLET, FILM COATED ORAL at 08:47

## 2025-05-23 RX ADMIN — Medication 116 MILLIGRAM(S): at 23:14

## 2025-05-23 RX ADMIN — Medication 2 TABLET(S): at 21:59

## 2025-05-23 RX ADMIN — HEPARIN SODIUM 5000 UNIT(S): 1000 INJECTION INTRAVENOUS; SUBCUTANEOUS at 21:59

## 2025-05-23 RX ADMIN — Medication 50 MILLIGRAM(S): at 15:31

## 2025-05-23 RX ADMIN — Medication 0.5 MILLIGRAM(S): at 21:30

## 2025-05-23 RX ADMIN — Medication 3 MILLIGRAM(S): at 23:19

## 2025-05-23 RX ADMIN — Medication 4 MILLIGRAM(S): at 04:42

## 2025-05-23 RX ADMIN — HEPARIN SODIUM 5000 UNIT(S): 1000 INJECTION INTRAVENOUS; SUBCUTANEOUS at 05:05

## 2025-05-23 RX ADMIN — INSULIN GLARGINE-YFGN 10 UNIT(S): 100 INJECTION, SOLUTION SUBCUTANEOUS at 00:07

## 2025-05-23 RX ADMIN — Medication 300 MILLIGRAM(S): at 08:47

## 2025-05-23 NOTE — PROGRESS NOTE ADULT - PROBLEM SELECTOR PLAN 5
-c/w glargine 10u qHS + sliding scale.   -monitor FS and adjust insulin as needed given pt will be on prednisone 100mg qd x5d. -c/w glargine 10u qHS + sliding scale.   -monitor FS and adjust insulin as needed given pt will be on prednisone 100mg qd x5d

## 2025-05-23 NOTE — PROGRESS NOTE ADULT - NS ATTEND AMEND GEN_ALL_CORE FT
Pt is a 53 yo woman with newy dx'd DLBCL, non-GCB type transferred from [facility] with obstructive jaundice due to lymphoma, abd  pain, diarrhea. She has had severe weight loss, malaise, anorexia, N/V.  She has a h/o T2DM, GERD, asthma, HLD, obesity.  She is resting comfortably today, visibly jaundice, pain now well managed.     PMHx newly dx'd DLBCL, was admitted at [facility] for jaundice and diarrhea x1wk, green in color; also reported malaise, anorexia, n/v (NBNB emesis), and 70 lb unintentional wt loss in 1-2wk.   CT a/p showed moderate intra/extrahepatic biliary ductal dilation w/ abrupt cutoff in CBD at the hilar with soft tissue measuring up to 4x4cm c/f lymph node mass, enlarged RP and retrocrural LAD, urinary bladder wall thickening, mild/mod R side pleural effusion, HSM w/ lesions, c/f mets.   For obstructive jaundice, GI was consulted there, no ERCP rec at the time.   Pt was transferred to [facility] for inpatient chemo.  LFTs markedly abnl, TB 12.2, [id number], ,   Microcytic anemia, mild thrombocytosis    Hypercalcemic  Check iron studies, Hgb EP, retic,  viral serologies (-); still need to check Hep B Core IgG  Discussed plan to relieve biliary obstruction with rituximab and cytoxan as pretherapy for standard anthracycline based therapy.  FISH for MYC, BCL2, BCL6 rearrangement pending.  Risks, benefits, toxicities d/w pt and  and pt consented to therapy.  Will need IR for BMA/Bx: will need to start therapy if unable to perform marrow today  Incr IVFs to 150 cc/hr  allopurinol  rasburicase if urate > 8  TLS labs 2x/d  Daily LFTs  coags 2x/d  I & O  diurese as needed  will use peripheral iv access and aim for OPD mediport placement  Cytoxan 1000 g/sq m and rituxan 375 mg/sq m iv today  OOB

## 2025-05-23 NOTE — DISCHARGE NOTE PROVIDER - CARE PROVIDER_API CALL
Sharron Justice  [address], [place] [address]  Phone: [phone]  Fax: [phone]  Follow Up Time:

## 2025-05-23 NOTE — PROGRESS NOTE ADULT - SUBJECTIVE AND OBJECTIVE BOX
Diagnosis: DLBCL non germinal center     Protocol/Chemo Regimen: TBD     Day: -     Subjective:     Review of Systems: Denies nausea, vomiting, diarrhea, chest pain, SOB     Pain scale:  -    Diet: Regular     Allergies: azithromycin (Unknown), Januvia (Rash), Pepcid (Hives; Rash)    -------------------------         Diagnosis: DLBCL non germinal center     Protocol/Chemo Regimen: Cytoxan and Rituximab     Day: 1     Subjective: severe abdominal and back pain, jaundice     Review of Systems: Denies nausea, vomiting, diarrhea, chest pain, SOB     Pain scale:  -    Diet: Regular     Allergies: azithromycin (Unknown), Januvia (Rash), Pepcid (Hives; Rash)    HEME/ONC MEDICATIONS  heparin   Injectable 5000 Unit(s) SubCutaneous every 8 hours    STANDING MEDICATIONS  albuterol/ipratropium for Nebulization 3 milliLiter(s) Nebulizer every 6 hours  allopurinol 300 milliGRAM(s) Oral daily  dextrose 5%. 1000 milliLiter(s) IV Continuous <Continuous>  dextrose 5%. 1000 milliLiter(s) IV Continuous <Continuous>  dextrose 50% Injectable 25 Gram(s) IV Push once  dextrose 50% Injectable 12.5 Gram(s) IV Push once  dextrose 50% Injectable 25 Gram(s) IV Push once  glucagon  Injectable 1 milliGRAM(s) IntraMuscular once  insulin glargine Injectable (LANTUS) 10 Unit(s) SubCutaneous at bedtime  insulin lispro (ADMELOG) corrective regimen sliding scale   SubCutaneous three times a day before meals  insulin lispro (ADMELOG) corrective regimen sliding scale   SubCutaneous at bedtime  pantoprazole    Tablet 40 milliGRAM(s) Oral before breakfast  phytonadione   Solution 5 milliGRAM(s) Oral daily  polyethylene glycol 3350 17 Gram(s) Oral daily  predniSONE   Tablet 100 milliGRAM(s) Oral every 24 hours  senna 2 Tablet(s) Oral at bedtime  sertraline 50 milliGRAM(s) Oral daily  sodium chloride 0.9%. 1000 milliLiter(s) IV Continuous <Continuous>    PRN MEDICATIONS  acetaminophen     Tablet .. 650 milliGRAM(s) Oral every 6 hours PRN  aluminum hydroxide/magnesium hydroxide/simethicone Suspension 30 milliLiter(s) Oral every 4 hours PRN  dextrose Oral Gel 15 Gram(s) Oral once PRN  HYDROmorphone  Injectable 0.5 milliGRAM(s) IV Push every 3 hours PRN  HYDROmorphone  Injectable 0.2 milliGRAM(s) IV Push every 3 hours PRN  melatonin 3 milliGRAM(s) Oral at bedtime PRN  ondansetron Injectable 4 milliGRAM(s) IV Push every 8 hours PRN  oxyCODONE    IR 10 milliGRAM(s) Oral every 8 hours PRN    Vital Signs Last 24 Hrs  T(C): 36.8 (23 May 2025 09:57), Max: 37.1 (22 May 2025 18:42)  T(F): 98.3 (23 May 2025 09:57), Max: 98.8 (22 May 2025 18:42)  HR: 93 (23 May 2025 09:57) (93 - 99)  BP: 129/81 (23 May 2025 09:57) (129/81 - 155/87)  BP(mean): 99 (22 May 2025 18:42) (99 - 100)  RR: 18 (23 May 2025 09:57) (18 - 18)  SpO2: 96% (23 May 2025 09:57) (94% - 97%)    Parameters below as of 23 May 2025 09:57  Patient On (Oxygen Delivery Method): nasal cannula  O2 Flow (L/min): 2    PHYSICAL EXAM  General: adult in NAD  HEENT: clear oropharynx, no erythema, no ulcers; bilateral icteric sclera   Neck: supple  CV: normal S1, S2, RRR  Lungs: clear to auscultation, no wheezes, no rales  Abdomen: soft, nontender, nondistended, normal BS  Ext: no edema  Skin: no rash  Neuro: alert and oriented x 3    LABS:                        10.0   10.06 )-----------( 421      ( 23 May 2025 10:23 )             33.2     Mean Cell Volume : 72.5 fl  Mean Cell Hemoglobin : 21.8 pg  Mean Cell Hemoglobin Concentration : 30.1 g/dL  Auto Neutrophil # : x  Auto Lymphocyte # : x  Auto Monocyte # : x  Auto Eosinophil # : x  Auto Basophil # : x  Auto Neutrophil % : x  Auto Lymphocyte % : x  Auto Monocyte % : x  Auto Eosinophil % : x  Auto Basophil % : x    05-23  129[L]  |  92[L]  |  18  ----------------------------<  94  3.7   |  22  |  0.82    Ca    11.0[H]      23 May 2025 10:26  Phos  3.3     05-23  Mg     2.0     05-23    TPro  6.1  /  Alb  2.9[L]  /  TBili  12.2[H]  /  DBili  x   /  AST  333[H]  /  ALT  182[H]  /  AlkPhos  2472[H]  05-23    Mg 2.0  Phos 3.3    PT/INR - ( 23 May 2025 00:55 )   PT: 16.4 sec;   INR: 1.44 ratio      PTT - ( 23 May 2025 00:55 )  PTT:34.8 sec    Uric Acid 4.3    Uric Acid 4.8    Uric Acid 5.1

## 2025-05-23 NOTE — CONSULT NOTE ADULT - ASSESSMENT
54F who presented to [facility] for jaundice and diarrhea x1wk, green in color; also reported malaise, anorexia, n/v (NBNB emesis), and 70 lb unintentional wt loss in 1-2wk. CT a/p showed moderate intra/extrahepatic biliary ductal dilation w/ abrupt cutoff in CBD at the hilar with soft tissue measuring up to 4x4cm c/f lymph node mass, enlarged RP and retrocrural LAD, urinary bladder wall thickening, mild/mod R side pleural effusion, HSM w/ lesions, c/f mets. Pt with newly diagnosed DLBCL and is transferred to [facility] for inpatient chemo. Geriatrics and Palliative Medicine Team is consulted for cancer related pain

## 2025-05-23 NOTE — DISCHARGE NOTE PROVIDER - NSDCFUSCHEDAPPT_GEN_ALL_CORE_FT
Sharron Justice  API Healthcare Physician [facility]  [facility]  Scheduled Appointment: 05/28/2025    [facility]  [address]  Scheduled Appointment: 05/28/2025    [facility]  [facility]  Scheduled Appointment: 05/28/2025    Rk Roberson  API Healthcare Physician [facility]  [address]  Scheduled Appointment: 06/19/2025     Sharron Justice  [facility] Physician [facility]  [facility]  Scheduled Appointment: 05/28/2025    [facility]  [facility]  Scheduled Appointment: 05/28/2025    Rk Roberson  [facility] Physician [facility]  [address]  Scheduled Appointment: 06/19/2025

## 2025-05-23 NOTE — DISCHARGE NOTE PROVIDER - NSDCMRMEDTOKEN_GEN_ALL_CORE_FT
acetaminophen 325 mg oral tablet: 3 tab(s) orally every 8 hours  amLODIPine 10 mg oral tablet: 1 tab(s) orally  bisacodyl 5 mg oral delayed release tablet: 1 tab(s) orally once a day As needed Constipation  HYDROmorphone: 0.5 milligram(s) intravenous every 4 hours as needed for  severe pain  HYDROmorphone 0.2 mg/mL-NaCl 0.9% intravenous solution: 1 milliliter(s) intravenous every 4 hours As needed Moderate Pain (4 - 6)  HYDROmorphone 0.2 mg/mL-NaCl 0.9% intravenous solution: 1 milliliter(s) intravenous every 4 hours As needed For Breakthrough pain  indomethacin 50 mg rectal suppository: 2 suppository(ies) rectal once  insulin glargine 100 units/mL subcutaneous solution: 10 unit(s) subcutaneous once a day (at bedtime)  insulin lispro 100 units/mL injectable solution: 1 unit(s) injectable 4 times a day (before meals and at bedtime) 2 Unit(s) if Glucose 151 - 200  4 Unit(s) if Glucose 201 - 250  6 Unit(s) if Glucose 251 - 300  8 Unit(s) if Glucose 301 - 350  10 Unit(s) if Glucose 351 - 400  12 Unit(s) if Glucose Greater Than 400  methocarbamol 500 mg oral tablet: 1 tab(s) orally 2 times a day As needed Muscle Spasm  polyethylene glycol 3350 oral powder for reconstitution: 17 gram(s) orally once a day  senna leaf extract oral tablet: 2 tab(s) orally once a day (at bedtime)  sertraline 50 mg oral tablet: 1 tab(s) orally once a day   acetaminophen 325 mg oral tablet: 3 tab(s) orally every 8 hours  allopurinol 300 mg oral tablet: 1 tab(s) orally once a day  amLODIPine 10 mg oral tablet: 1 tab(s) orally once a day  bisacodyl 5 mg oral delayed release tablet: 1 tab(s) orally once a day As needed Constipation  Dilaudid 4 mg oral tablet: 1 tab(s) orally every 4 hours as needed for  severe pain may use 1/2 tab every 4 hours for moderate pain as needed MDD: 6 tabs  insulin glargine 100 units/mL subcutaneous solution: 10 unit(s) subcutaneous once a day (at bedtime)  insulin lispro 100 units/mL injectable solution: 1 unit(s) injectable 4 times a day (before meals and at bedtime) 2 Unit(s) if Glucose 151 - 200  4 Unit(s) if Glucose 201 - 250  6 Unit(s) if Glucose 251 - 300  8 Unit(s) if Glucose 301 - 350  10 Unit(s) if Glucose 351 - 400  12 Unit(s) if Glucose Greater Than 400  methocarbamol 500 mg oral tablet: 1 tab(s) orally 2 times a day As needed Muscle Spasm  naloxone 4 mg/0.1 mL nasal spray: 1 spray(s) intranasally once a day as needed for  opioid overdose MDD: 1  oxyCODONE 30 mg oral tablet: 1 tab(s) orally 2 times a day MDD: 2 tabs  polyethylene glycol 3350 oral powder for reconstitution: 17 gram(s) orally once a day  Protonix 40 mg oral delayed release tablet: 1 tab(s) orally once a day  Reglan 10 mg oral tablet: 1 tab(s) orally 4 times a day as needed for  nausea  senna leaf extract oral tablet: 2 tab(s) orally once a day (at bedtime)  sertraline 50 mg oral tablet: 1 tab(s) orally once a day

## 2025-05-23 NOTE — PHARMACOTHERAPY INTERVENTION NOTE - COMMENTS
Counseled patient &  on new chemotherapy regimen-  R-CHOP -Rituximab & Cyclophosphamide only (Doxorubicin & vincristine once hepatic function well controlled)  Discussed common side effects:   General management- general monitoring: labwork to monitor counts, antifungal, antibiotics, antivirals if needed  -Rituximab- infusion related reactions, HepatitisB reactivation  -Cyclophosphamide- low counts, nephrotoxicity, alopecia, nausea/ vomiting   -Prednisone- increased sugars, insomnia, increased appetite, mood changes  Explained measures we take to mitigate these side effects:   -Rituximab- starting at a low rate and increasing slowly, pre-medications   -Cytoxan- hydration  -Prednisone-routine BG monitoring, insulin if needed  Emetogenicity risk:   -Rituximab- Low (<10%)  -Cyclophosphamide- Moderate (30-90%)  -Prednisone- used as an antiemetic    -Printed teaching materials provided and reviewed from [employer] and  [email or web address]

## 2025-05-23 NOTE — DISCHARGE NOTE PROVIDER - NSDCCPCAREPLAN_GEN_ALL_CORE_FT
PRINCIPAL DISCHARGE DIAGNOSIS  Diagnosis: Diffuse large B cell lymphoma  Assessment and Plan of Treatment: Notify MD or report to ER for fever greater or equal to 100.4, persistent nausea, vomiting, diarrhea, bleeding.

## 2025-05-23 NOTE — CONSULT NOTE ADULT - PROBLEM SELECTOR RECOMMENDATION 3
- Geriatrics and Palliative Medicine Team will follow with you for assistance on pain control. Case d/w heme/onc team today.    An MD Megan  [facility] Team Consults  Please call if we can be of assistance, [phone]

## 2025-05-23 NOTE — CONSULT NOTE ADULT - PROBLEM/RECOMMENDATION-2
Assisted to eob voiding by urinal. 300 ml clear yellow urine. Reports improvement in strength to rle, pt is unsteady to stand but can bare weight. Right hand and arm weakness    DISPLAY PLAN FREE TEXT

## 2025-05-23 NOTE — PROGRESS NOTE ADULT - PROBLEM SELECTOR PLAN 2
-will trial morphine 2mg ivp for mod pain or 4mg ivp for severe pain; pt was on dilaudid 0.2mg Resumed regimen from [facility]. Appreciate palliative care for pain management
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
## 2025-05-23 NOTE — PROGRESS NOTE ADULT - PROBLEM SELECTOR PLAN 1
- started prednisone 100mg daily for 5 days   - started allopurinol 300mg daily   - f/u CT chest w contrast   - ordered: coags, fibrinogen, LDH, uric acid, G6PD, hepatitis B serologies (including core antibody total) and hepatitis C, HIV  - f/u TTE    - Trend TLS labs every 12 hours (CMP, Phos, LDH, uric acid) -> ordered for q12h x5d until 5/27, renew order as needed.   - Give rasburicase 3mg IV for uric acid > 8.0  - Plan for chemo: cytoxan and rituximab on 5/23   - Transfuse if Hgb < 7.0. Transfuse if platelets <10K, or <15K if febrile. Transfuse for platelets <50K if bleeding. - started prednisone 100mg daily for 5 days   - started allopurinol 300mg daily   - f/u CT chest w contrast   - ordered: coags, fibrinogen, LDH, uric acid, G6PD, hepatitis B serologies (including core antibody total) and hepatitis C, HIV  - f/u MUGA   - Trend TLS labs every 12 hours (CMP, Phos, LDH, uric acid)   - Give rasburicase 3mg IV for uric acid > 8.0  - Follow up anemia workup, immunoelectrophoresis, EBV, ionized calcium, intact PTH, Bone marrow biopsy   - Plan for chemo: cytoxan and rituximab on 5/23   - Transfuse if Hgb < 7.0. Transfuse if platelets <10K, or <15K if febrile. Transfuse for platelets <50K if bleeding

## 2025-05-23 NOTE — DISCHARGE NOTE PROVIDER - HOSPITAL COURSE
54F PMHx newly dx'd DLBCL, was admitted at [facility] for jaundice and diarrhea x1wk, green in color; also reported malaise, anorexia, n/v (NBNB emesis), and 70 lb unintentional wt loss in 1-2wk. CT a/p showed moderate intra/extrahepatic biliary ductal dilation w/ abrupt cutoff in CBD at the hilar with soft tissue measuring up to 4x4cm c/f lymph node mass, enlarged RP and retrocrural LAD, urinary bladder wall thickening, mild/mod R side pleural effusion, HSM w/ lesions, c/f mets. For obstructive jaundice, GI was consulted there, no ERCP rec at the time. Pt was transferred to [facility] for inpatient chemo. On 5/23, patient received Rituximab and Cytoxan. Tumor lysis labs were monitored twice daily. 54 F PMHx newly dx'd DLBCL, was admitted at [facility] for jaundice and diarrhea x1wk, green in color; also reported malaise, anorexia, n/v (NBNB emesis), and 70 lb unintentional wt loss in 1-2wk. CT a/p showed moderate intra/extrahepatic biliary ductal dilation w/ abrupt cutoff in CBD at the hilar with soft tissue measuring up to 4x4cm c/f lymph node mass, enlarged RP and retrocrural LAD, urinary bladder wall thickening, mild/mod R side pleural effusion, HSM w/ lesions, c/f mets. For obstructive jaundice, GI was consulted there, no ERCP rec at the time. Pt was transferred to [facility] for inpatient chemo. On 5/23, patient received Rituximab and Cytoxan with G-CSF given x 5 days.  Patient Tumor lysis labs were monitored twice daily and will be discharged home on allopurinol.      patient stable for discharge with follow up at [facility] with Dr. Justice. Palliative care consulted for cancer related pain management. Patient will go home on  oral dilaudid 2-4 mg Q4 as needed and oxycontin 30mg BID with outpatient follow up.

## 2025-05-23 NOTE — CONSULT NOTE ADULT - PROBLEM SELECTOR RECOMMENDATION 2
In setting of diffuse disease burden noted on CT scan  - Agree oxycodone 10 mg q8 hours prn mod pain (pt takes this at home for chronic back pain)  - Order IV dilaudid 0.5 mg q3 hours prn severe pain  - Order IV dilaudid 0.2 mg q3 hours prn breakthrough pain  - briefly discussed utility of PCA but pt does not feel she needs it at this time. She understands her pain should be improving as her lymphoma treatment starts

## 2025-05-23 NOTE — DISCHARGE NOTE PROVIDER - NSDCFUADDINST_GEN_ALL_CORE_FT
Follow up at [facility] to see Dr. Justice on ______  Follow up at [facility] to see Dr. Justice on  Wednesday 5/28/25 at 1:40pm

## 2025-05-23 NOTE — PROGRESS NOTE ADULT - PROBLEM SELECTOR PLAN 3
-GI discussed with Dr. Justice at [facility], ERCP was held, pt was resumed on diet.   -trend liver enzymes daily -GI discussed with Dr. Justice at [facility], ERCP was held, pt was resumed on diet.   -Grade 3 transaminitis. Trend liver enzymes daily

## 2025-05-24 LAB
ALBUMIN SERPL ELPH-MCNC: 2.8 G/DL — LOW (ref 3.3–5)
ALBUMIN SERPL ELPH-MCNC: 2.9 G/DL — LOW (ref 3.3–5)
ALP SERPL-CCNC: 2045 U/L — HIGH (ref 40–120)
ALP SERPL-CCNC: 2409 U/L — HIGH (ref 40–120)
ALT FLD-CCNC: 137 U/L — HIGH (ref 10–45)
ALT FLD-CCNC: 148 U/L — HIGH (ref 10–45)
ANION GAP SERPL CALC-SCNC: 12 MMOL/L — SIGNIFICANT CHANGE UP (ref 5–17)
ANION GAP SERPL CALC-SCNC: 14 MMOL/L — SIGNIFICANT CHANGE UP (ref 5–17)
AST SERPL-CCNC: 128 U/L — HIGH (ref 10–40)
AST SERPL-CCNC: 228 U/L — HIGH (ref 10–40)
B2 MICROGLOB SERPL-MCNC: 4.3 MG/L — HIGH (ref 0.8–2.2)
BASOPHILS # BLD AUTO: 0.03 K/UL — SIGNIFICANT CHANGE UP (ref 0–0.2)
BASOPHILS NFR BLD AUTO: 0.3 % — SIGNIFICANT CHANGE UP (ref 0–2)
BILIRUB SERPL-MCNC: 6.1 MG/DL — HIGH (ref 0.2–1.2)
BILIRUB SERPL-MCNC: 8.7 MG/DL — HIGH (ref 0.2–1.2)
BUN SERPL-MCNC: 24 MG/DL — HIGH (ref 7–23)
BUN SERPL-MCNC: 25 MG/DL — HIGH (ref 7–23)
CALCIUM SERPL-MCNC: 10.3 MG/DL — SIGNIFICANT CHANGE UP (ref 8.4–10.5)
CALCIUM SERPL-MCNC: 10.7 MG/DL — HIGH (ref 8.4–10.5)
CALCIUM SERPL-MCNC: 10.9 MG/DL — HIGH (ref 8.4–10.5)
CHLORIDE SERPL-SCNC: 97 MMOL/L — SIGNIFICANT CHANGE UP (ref 96–108)
CHLORIDE SERPL-SCNC: 97 MMOL/L — SIGNIFICANT CHANGE UP (ref 96–108)
CO2 SERPL-SCNC: 20 MMOL/L — LOW (ref 22–31)
CO2 SERPL-SCNC: 22 MMOL/L — SIGNIFICANT CHANGE UP (ref 22–31)
CREAT SERPL-MCNC: 0.79 MG/DL — SIGNIFICANT CHANGE UP (ref 0.5–1.3)
CREAT SERPL-MCNC: 0.95 MG/DL — SIGNIFICANT CHANGE UP (ref 0.5–1.3)
EBV EA AB SER IA-ACNC: >150 U/ML — HIGH
EBV EA AB TITR SER IF: POSITIVE
EBV EA IGG SER-ACNC: POSITIVE
EBV NA IGG SER IA-ACNC: >600 U/ML — HIGH
EBV PATRN SPEC IB-IMP: SIGNIFICANT CHANGE UP
EBV VCA IGG AVIDITY SER QL IA: POSITIVE
EBV VCA IGM SER IA-ACNC: 16 U/ML — SIGNIFICANT CHANGE UP
EBV VCA IGM SER IA-ACNC: >750 U/ML — HIGH
EBV VCA IGM TITR FLD: NEGATIVE — SIGNIFICANT CHANGE UP
EGFR: 71 ML/MIN/1.73M2 — SIGNIFICANT CHANGE UP
EGFR: 71 ML/MIN/1.73M2 — SIGNIFICANT CHANGE UP
EGFR: 89 ML/MIN/1.73M2 — SIGNIFICANT CHANGE UP
EGFR: 89 ML/MIN/1.73M2 — SIGNIFICANT CHANGE UP
EOSINOPHIL # BLD AUTO: 0.01 K/UL — SIGNIFICANT CHANGE UP (ref 0–0.5)
EOSINOPHIL NFR BLD AUTO: 0.1 % — SIGNIFICANT CHANGE UP (ref 0–6)
FERRITIN SERPL-MCNC: 991 NG/ML — HIGH (ref 13–330)
GLUCOSE BLDC GLUCOMTR-MCNC: 116 MG/DL — HIGH (ref 70–99)
GLUCOSE BLDC GLUCOMTR-MCNC: 134 MG/DL — HIGH (ref 70–99)
GLUCOSE BLDC GLUCOMTR-MCNC: 192 MG/DL — HIGH (ref 70–99)
GLUCOSE BLDC GLUCOMTR-MCNC: 202 MG/DL — HIGH (ref 70–99)
GLUCOSE SERPL-MCNC: 125 MG/DL — HIGH (ref 70–99)
GLUCOSE SERPL-MCNC: 231 MG/DL — HIGH (ref 70–99)
HCT VFR BLD CALC: 32.1 % — LOW (ref 34.5–45)
HGB BLD-MCNC: 9.5 G/DL — LOW (ref 11.5–15.5)
IMM GRANULOCYTES NFR BLD AUTO: 1.3 % — HIGH (ref 0–0.9)
IRON SATN MFR SERPL: 14 % — SIGNIFICANT CHANGE UP (ref 14–50)
IRON SATN MFR SERPL: 37 UG/DL — SIGNIFICANT CHANGE UP (ref 30–160)
LDH SERPL L TO P-CCNC: 240 U/L — SIGNIFICANT CHANGE UP (ref 50–242)
LDH SERPL L TO P-CCNC: 338 U/L — HIGH (ref 50–242)
LYMPHOCYTES # BLD AUTO: 0.41 K/UL — LOW (ref 1–3.3)
LYMPHOCYTES # BLD AUTO: 3.8 % — LOW (ref 13–44)
MAGNESIUM SERPL-MCNC: 1.9 MG/DL — SIGNIFICANT CHANGE UP (ref 1.6–2.6)
MAGNESIUM SERPL-MCNC: 2.1 MG/DL — SIGNIFICANT CHANGE UP (ref 1.6–2.6)
MCHC RBC-ENTMCNC: 21.8 PG — LOW (ref 27–34)
MCHC RBC-ENTMCNC: 29.6 G/DL — LOW (ref 32–36)
MCV RBC AUTO: 73.8 FL — LOW (ref 80–100)
MONOCYTES # BLD AUTO: 0.27 K/UL — SIGNIFICANT CHANGE UP (ref 0–0.9)
MONOCYTES NFR BLD AUTO: 2.5 % — SIGNIFICANT CHANGE UP (ref 2–14)
NEUTROPHILS # BLD AUTO: 9.84 K/UL — HIGH (ref 1.8–7.4)
NEUTROPHILS NFR BLD AUTO: 92 % — HIGH (ref 43–77)
NRBC BLD AUTO-RTO: 0 /100 WBCS — SIGNIFICANT CHANGE UP (ref 0–0)
PHOSPHATE SERPL-MCNC: 2.5 MG/DL — SIGNIFICANT CHANGE UP (ref 2.5–4.5)
PHOSPHATE SERPL-MCNC: 3.8 MG/DL — SIGNIFICANT CHANGE UP (ref 2.5–4.5)
PLATELET # BLD AUTO: 414 K/UL — HIGH (ref 150–400)
POTASSIUM SERPL-MCNC: 3.7 MMOL/L — SIGNIFICANT CHANGE UP (ref 3.5–5.3)
POTASSIUM SERPL-MCNC: 4 MMOL/L — SIGNIFICANT CHANGE UP (ref 3.5–5.3)
POTASSIUM SERPL-SCNC: 3.7 MMOL/L — SIGNIFICANT CHANGE UP (ref 3.5–5.3)
POTASSIUM SERPL-SCNC: 4 MMOL/L — SIGNIFICANT CHANGE UP (ref 3.5–5.3)
PROT SERPL-MCNC: 6 G/DL — SIGNIFICANT CHANGE UP (ref 6–8.3)
PROT SERPL-MCNC: 6.1 G/DL — SIGNIFICANT CHANGE UP (ref 6–8.3)
PTH-INTACT FLD-MCNC: 3 PG/ML — LOW (ref 15–65)
RBC # BLD: 4.35 M/UL — SIGNIFICANT CHANGE UP (ref 3.8–5.2)
RBC # FLD: 27 % — HIGH (ref 10.3–14.5)
SODIUM SERPL-SCNC: 129 MMOL/L — LOW (ref 135–145)
SODIUM SERPL-SCNC: 133 MMOL/L — LOW (ref 135–145)
TIBC SERPL-MCNC: 261 UG/DL — SIGNIFICANT CHANGE UP (ref 220–430)
UIBC SERPL-MCNC: 223 UG/DL — SIGNIFICANT CHANGE UP (ref 110–370)
URATE SERPL-MCNC: 3.1 MG/DL — SIGNIFICANT CHANGE UP (ref 2.5–7)
URATE SERPL-MCNC: 3.3 MG/DL — SIGNIFICANT CHANGE UP (ref 2.5–7)
VIT B12 SERPL-MCNC: 499 PG/ML — SIGNIFICANT CHANGE UP (ref 232–1245)
WBC # BLD: 10.7 K/UL — HIGH (ref 3.8–10.5)
WBC # FLD AUTO: 10.7 K/UL — HIGH (ref 3.8–10.5)

## 2025-05-24 PROCEDURE — 99233 SBSQ HOSP IP/OBS HIGH 50: CPT

## 2025-05-24 RX ORDER — FUROSEMIDE 10 MG/ML
20 INJECTION INTRAMUSCULAR; INTRAVENOUS ONCE
Refills: 0 | Status: COMPLETED | OUTPATIENT
Start: 2025-05-24 | End: 2025-05-24

## 2025-05-24 RX ADMIN — FUROSEMIDE 20 MILLIGRAM(S): 10 INJECTION INTRAMUSCULAR; INTRAVENOUS at 16:43

## 2025-05-24 RX ADMIN — Medication 0.5 MILLIGRAM(S): at 06:30

## 2025-05-24 RX ADMIN — Medication 0.5 MILLIGRAM(S): at 00:40

## 2025-05-24 RX ADMIN — IPRATROPIUM BROMIDE AND ALBUTEROL SULFATE 3 MILLILITER(S): .5; 2.5 SOLUTION RESPIRATORY (INHALATION) at 16:53

## 2025-05-24 RX ADMIN — Medication 0.5 MILLIGRAM(S): at 07:10

## 2025-05-24 RX ADMIN — OXYCODONE HYDROCHLORIDE 10 MILLIGRAM(S): 30 TABLET ORAL at 21:32

## 2025-05-24 RX ADMIN — HEPARIN SODIUM 5000 UNIT(S): 1000 INJECTION INTRAVENOUS; SUBCUTANEOUS at 21:32

## 2025-05-24 RX ADMIN — IPRATROPIUM BROMIDE AND ALBUTEROL SULFATE 3 MILLILITER(S): .5; 2.5 SOLUTION RESPIRATORY (INHALATION) at 06:35

## 2025-05-24 RX ADMIN — SERTRALINE 50 MILLIGRAM(S): 100 TABLET, FILM COATED ORAL at 11:10

## 2025-05-24 RX ADMIN — INSULIN GLARGINE-YFGN 10 UNIT(S): 100 INJECTION, SOLUTION SUBCUTANEOUS at 21:33

## 2025-05-24 RX ADMIN — FILGRASTIM 480 MICROGRAM(S): 300 INJECTION, SOLUTION INTRAVENOUS; SUBCUTANEOUS at 11:09

## 2025-05-24 RX ADMIN — Medication 0.5 MILLIGRAM(S): at 11:24

## 2025-05-24 RX ADMIN — POLYETHYLENE GLYCOL 3350 17 GRAM(S): 17 POWDER, FOR SOLUTION ORAL at 11:15

## 2025-05-24 RX ADMIN — Medication 0.5 MILLIGRAM(S): at 11:10

## 2025-05-24 RX ADMIN — Medication 5 MILLIGRAM(S): at 11:10

## 2025-05-24 RX ADMIN — Medication 0.5 MILLIGRAM(S): at 01:10

## 2025-05-24 RX ADMIN — Medication 2 TABLET(S): at 21:32

## 2025-05-24 RX ADMIN — OXYCODONE HYDROCHLORIDE 10 MILLIGRAM(S): 30 TABLET ORAL at 13:49

## 2025-05-24 RX ADMIN — AMLODIPINE BESYLATE 10 MILLIGRAM(S): 10 TABLET ORAL at 06:35

## 2025-05-24 RX ADMIN — Medication 0.5 MILLIGRAM(S): at 22:27

## 2025-05-24 RX ADMIN — Medication 150 MILLILITER(S): at 06:35

## 2025-05-24 RX ADMIN — Medication 0.5 MILLIGRAM(S): at 18:25

## 2025-05-24 RX ADMIN — HEPARIN SODIUM 5000 UNIT(S): 1000 INJECTION INTRAVENOUS; SUBCUTANEOUS at 13:39

## 2025-05-24 RX ADMIN — IPRATROPIUM BROMIDE AND ALBUTEROL SULFATE 3 MILLILITER(S): .5; 2.5 SOLUTION RESPIRATORY (INHALATION) at 11:10

## 2025-05-24 RX ADMIN — OXYCODONE HYDROCHLORIDE 10 MILLIGRAM(S): 30 TABLET ORAL at 13:41

## 2025-05-24 RX ADMIN — Medication 300 MILLIGRAM(S): at 11:10

## 2025-05-24 RX ADMIN — Medication 40 MILLIGRAM(S): at 06:35

## 2025-05-24 RX ADMIN — PREDNISONE 100 MILLIGRAM(S): 20 TABLET ORAL at 13:39

## 2025-05-24 RX ADMIN — INSULIN LISPRO 2: 100 INJECTION, SOLUTION INTRAVENOUS; SUBCUTANEOUS at 08:25

## 2025-05-24 RX ADMIN — OXYCODONE HYDROCHLORIDE 10 MILLIGRAM(S): 30 TABLET ORAL at 22:30

## 2025-05-24 RX ADMIN — HEPARIN SODIUM 5000 UNIT(S): 1000 INJECTION INTRAVENOUS; SUBCUTANEOUS at 06:35

## 2025-05-24 RX ADMIN — Medication 0.5 MILLIGRAM(S): at 15:49

## 2025-05-24 RX ADMIN — Medication 0.5 MILLIGRAM(S): at 14:54

## 2025-05-24 NOTE — PROGRESS NOTE ADULT - PROBLEM SELECTOR PLAN 3
In the event of newly developing, evolving, or worsening symptoms, please contact the Palliative Medicine team via pager (if the patient is at [facility] #[phone] or if the patient is at [facility] #[phone]) The Geriatric and Palliative Medicine service has coverage 24 hours a day/ 7 days a week to provide medical recommendations regarding symptom management needs via telephone.

## 2025-05-24 NOTE — PROGRESS NOTE ADULT - PROBLEM SELECTOR PLAN 1
Diffuse disease burden noted on CT scan   -Pt started chemo on 5/23, tolerated it well   - appreciate heme/onc recs

## 2025-05-24 NOTE — PROGRESS NOTE ADULT - NS ATTEND AMEND GEN_ALL_CORE FT
Pt is a 55 yo woman with newy dx'd DLBCL, non-GCB type transferred from [facility] with obstructive jaundice due to lymphoma, abd  pain, diarrhea. She has had severe weight loss, malaise, anorexia, N/V.  She has a h/o T2DM, GERD, asthma, HLD, obesity.  She is resting comfortably today, visibly jaundice, pain now well managed.     PMHx newly dx'd DLBCL, was admitted at [facility] for jaundice and diarrhea x1wk, green in color; also reported malaise, anorexia, n/v (NBNB emesis), and 70 lb unintentional wt loss in 1-2wk.   CT a/p showed moderate intra/extrahepatic biliary ductal dilation w/ abrupt cutoff in CBD at the hilar with soft tissue measuring up to 4x4cm c/f lymph node mass, enlarged RP and retrocrural LAD, urinary bladder wall thickening, mild/mod R side pleural effusion, HSM w/ lesions, c/f mets.   For obstructive jaundice, GI was consulted there, no ERCP rec at the time.   Pt was transferred to [facility] for inpatient chemo.  LFTs markedly abnl, TB 12.2, [id number], ,   Microcytic anemia, mild thrombocytosis    Hypercalcemic  Check iron studies, Hgb EP, retic,  viral serologies (-); still need to check Hep B Core IgG  Discussed plan to relieve biliary obstruction with rituximab and cytoxan as pretherapy for standard anthracycline based therapy.  FISH for MYC, BCL2, BCL6 rearrangement pending.  Risks, benefits, toxicities d/w pt and  and pt consented to therapy.  Will need IR for BMA/Bx: will need to start therapy if unable to perform marrow today  Incr IVFs to 150 cc/hr  allopurinol  rasburicase if urate > 8  TLS labs 2x/d  Daily LFTs  coags 2x/d  I & O  diurese as needed  will use peripheral iv access and aim for OPD mediport placement  Cytoxan 1000 g/sq m and rituxan 375 mg/sq m iv today  OOB Pt is a 53 yo woman with newy dx'd DLBCL, non-GCB type transferred from [facility] with obstructive jaundice due to lymphoma, abd  pain, diarrhea. She has had severe weight loss, malaise, anorexia, N/V.  She has a h/o T2DM, GERD, asthma, HLD, obesity.    PMHx newly dx'd DLBCL, was admitted at [facility] for jaundice and diarrhea x1wk, green in color; also reported malaise, anorexia, n/v (NBNB emesis), and 70 lb unintentional wt loss in 1-2wk.   CT a/p showed moderate intra/extrahepatic biliary ductal dilation w/ abrupt cutoff in CBD at the hilar with soft tissue measuring up to 4x4cm c/f lymph node mass, enlarged RP and retrocrural LAD, urinary bladder wall thickening, mild/mod R side pleural effusion, HSM w/ lesions, c/f mets.   For obstructive jaundice, GI was consulted there, no ERCP rec at the time.   Pt was transferred to [facility] for inpatient chemo. s/p Cytoxan 1000 g/sq m and rituxan 375 mg/sq m iv on 5/23, today is day 2  LFTs markedly abnl, TB 12.2, [id number], ,     Hypercalcemic  Discussed plan to relieve biliary obstruction with rituximab and cytoxan as pretherapy for standard anthracycline based therapy.  FISH for MYC, BCL2, BCL6 rearrangement pending.  Incr IVFs to 150 cc/hr  allopurinol  rasburicase if urate > 8  TLS labs 2x/d  Daily LFTs - bili improving post Ritux/Cytox  coags 2x/d  I & O  diurese as needed  will use peripheral iv access and aim for OPD mediport placement    OOB

## 2025-05-24 NOTE — ADVANCED PRACTICE NURSE CONSULT - ASSESSMENT
Pt alert and O x 4. VSS, afebrile. labs reviewed by Day team on rounds. Right 20G PIV access patent, + blood return obtained and flushing well. Site intact, no s/s of swelling, redness or bleeding.  Pt educated about chemotherapy treatment, well understood.  2 certified RN verification done. At 2353 cyclophosphamide 1000 mg/m2= 2250 mg Iv started, with Zofran 16mg IVSS given prior to chemo, infusing over 1 hr via Right 20G PIV access via alaris pump. Pt tolerating well. Primary RN made aware. safety maintained.  
Pt A/Ox4, vital signs stable, afebrile. Pt denies pain/discomfort, N/V/D, SOB, chest pain. Pt education done , voiced understanding . Rt  20G PIV placed today, patent with positive blood return, dressing c/d/i. Labs reviewed by Dr Murillo on rounds. AST// 182, T Bili 12.2 , ok to continue treatment. HBV panel non-reactive from 5/23. Pt received Tylenol 650mg PO x1, Benadryl 50mg IV x1 and dexamethasone 20mg IV x1 as premedications. 2 certified RN verification completed. Pt educated on chemotherapy, verbalized understanding. Day 1, at 16:23, pt started Rituximab 375mg/m2= 844 mg IVSS x1 connected to lowest normal saline port via alaris pump, infusion initiated at rate of 50mL/hr, rate titrated [id number] as per policy. Vital signs checked q 30minutes as per protocol. Pts bp was high  , NP NARINDER Schulz made aware , amlopine 10 mg po , hydralazine 5 mg ivp x1 given with effect .  Pt tolerating well. Call bell in reach, safety maintained. Primary RN aware of plan of care

## 2025-05-24 NOTE — PROGRESS NOTE ADULT - PROBLEM SELECTOR PLAN 2
In setting of diffuse disease burden noted on CT scan  -  oxycodone 10 mg q8 hours prn mod pain (pt takes this at home for chronic back pain)  - IV dilaudid 0.5 mg q3 hours prn severe pain  - IV dilaudid 0.2 mg q3 hours prn breakthrough pain  - briefly discussed utility of PCA but pt does not feel she needs it at this time. She understands her pain should be improving as her lymphoma treatment starts.  - bowel regimen while on opioids   - narcan prn

## 2025-05-24 NOTE — PROGRESS NOTE ADULT - PROBLEM SELECTOR PLAN 1
- started prednisone 100mg daily for 5 days   - started allopurinol 300mg daily   - f/u CT chest w contrast   - ordered: coags, fibrinogen, LDH, uric acid, G6PD, hepatitis B serologies (including core antibody total) and hepatitis C, HIV  - f/u MUGA   - Trend TLS labs every 12 hours (CMP, Phos, LDH, uric acid)   - Give rasburicase 3mg IV for uric acid > 8.0  - Follow up anemia workup, immunoelectrophoresis, EBV, ionized calcium, intact PTH, Bone marrow biopsy   - Plan for chemo: cytoxan and rituximab on 5/23   - Transfuse if Hgb < 7.0. Transfuse if platelets <10K, or <15K if febrile. Transfuse for platelets <50K if bleeding - started prednisone 100mg daily for 5 days   - started allopurinol 300mg daily   - f/u CT chest w contrast   - ordered: coags, fibrinogen, LDH, uric acid, G6PD, hepatitis B serologies (including core antibody total) and hepatitis C, HIV  - Trend TLS labs every 12 hours (CMP, Phos, LDH, uric acid)   Give rasburicase 3mg IV for uric acid > 8.0  - Follow up anemia workup, immunoelectrophoresis, EBV, ionized calcium, intact PTH, Bone marrow biopsy   S/P cytoxan and rituximab on 5/23.  Transfuse if Hgb < 7.0. Transfuse if platelets <10K, or <15K if febrile. Transfuse for platelets <50K if bleeding.

## 2025-05-24 NOTE — PROGRESS NOTE ADULT - PROBLEM SELECTOR PLAN 5
-c/w glargine 10u qHS + sliding scale.   -monitor FS and adjust insulin as needed given pt will be on prednisone 100mg qd x5d

## 2025-05-24 NOTE — PROGRESS NOTE ADULT - SUBJECTIVE AND OBJECTIVE BOX
[facility] Geriatric[facility] and Palliative [facility]  Paulina Valle, Palliative Care Attending  Date of [id number] @ 13:19    SUBJECTIVE AND OBJECTIVE: Patient seen this AM with with spouse and her son at bedside. She was in good spirits, stated she tolerated chemo well yesterday and feels that her pain is being managed well. She states that the IV dilaudid dose is lasting her 4 hours and we discussed that eventually her pain meds would be transitioned to PO.     Indication for Geriatrics and Palliative Care Services/INTERVAL HPI: sx management in setting of new malignancy     OVERNIGHT EVENTS:  > 5/24: Over the past 24 hours, patient required 0.5mg IV dilaudid x5, IV zofran x1, melatonin x1.     DNR on chart:   Allergies    azithromycin (Unknown)  Januvia (Rash)  Pepcid (Hives; Rash)    Intolerances    MEDICATIONS  (STANDING):  albuterol/ipratropium for Nebulization 3 milliLiter(s) Nebulizer every 6 hours  allopurinol 300 milliGRAM(s) Oral daily  amLODIPine   Tablet 10 milliGRAM(s) Oral daily  dextrose 5%. 1000 milliLiter(s) (100 mL/Hr) IV Continuous <Continuous>  dextrose 5%. 1000 milliLiter(s) (50 mL/Hr) IV Continuous <Continuous>  dextrose 50% Injectable 25 Gram(s) IV Push once  dextrose 50% Injectable 12.5 Gram(s) IV Push once  dextrose 50% Injectable 25 Gram(s) IV Push once  filgrastim-sndz (ZARXIO) Injectable 480 MICROGram(s) SubCutaneous daily  glucagon  Injectable 1 milliGRAM(s) IntraMuscular once  heparin   Injectable 5000 Unit(s) SubCutaneous every 8 hours  insulin glargine Injectable (LANTUS) 10 Unit(s) SubCutaneous at bedtime  insulin lispro (ADMELOG) corrective regimen sliding scale   SubCutaneous three times a day before meals  insulin lispro (ADMELOG) corrective regimen sliding scale   SubCutaneous at bedtime  pantoprazole    Tablet 40 milliGRAM(s) Oral before breakfast  phytonadione   Solution 5 milliGRAM(s) Oral daily  polyethylene glycol 3350 17 Gram(s) Oral daily  predniSONE   Tablet 100 milliGRAM(s) Oral every 24 hours  senna 2 Tablet(s) Oral at bedtime  sertraline 50 milliGRAM(s) Oral daily  sodium chloride 0.9%. 1000 milliLiter(s) (150 mL/Hr) IV Continuous <Continuous>    MEDICATIONS  (PRN):  acetaminophen     Tablet .. 650 milliGRAM(s) Oral every 6 hours PRN Temp greater or equal to 38C (100.4F), Mild Pain (1 - 3)  aluminum hydroxide/magnesium hydroxide/simethicone Suspension 30 milliLiter(s) Oral every 4 hours PRN Dyspepsia  dextrose Oral Gel 15 Gram(s) Oral once PRN Blood Glucose LESS THAN 70 milliGRAM(s)/deciliter  HYDROmorphone  Injectable 0.5 milliGRAM(s) IV Push every 3 hours PRN Severe Pain (7 - 10)  HYDROmorphone  Injectable 0.2 milliGRAM(s) IV Push every 3 hours PRN breakthrough pain  melatonin 3 milliGRAM(s) Oral at bedtime PRN Insomnia  metoclopramide Injectable 10 milliGRAM(s) IV Push every 6 hours PRN nausea/vomitting  ondansetron Injectable 4 milliGRAM(s) IV Push every 8 hours PRN Nausea and/or Vomiting  oxyCODONE    IR 10 milliGRAM(s) Oral every 8 hours PRN Moderate Pain (4 - 6)      ITEMS UNCHECKED ARE NOT PRESENT    PRESENT SYMPTOMS: [ ]Unable to self-report - see [ ] CPOT [ ] PAINADS [ ] RDOS  Source if other than patient:  [ ]Family   [ ]Team     Pain: [ x]yes [ ]no  QOL impact - unable to be comfortable throughout the day  Location -  abdomen, R                 Aggravating factors - none  Quality - excruciating  Radiation - down R leg  Timing- constant   Severity (0-10 scale): 10  Minimal acceptable level (0-10 scale): 0      CPOT:    https://www.[facility].org/getattachment/[email or web address]/Critical-Care-Pain-Observation-Tool-(CPOT)    Dyspnea:                           [ ]Mild [ ]Moderate [ ]Severe  Anxiety:                             [ ]Mild [ ]Moderate [ ]Severe  Fatigue:                             [ ]Mild [ ]Moderate [ ]Severe  Nausea:                             [ ]Mild [ ]Moderate [ ]Severe  Loss of appetite:              [ ]Mild [ ]Moderate [ ]Severe  Constipation:                    [ ]Mild [ ]Moderate [ ]Severe  Other Symptoms:  [ ]All other review of systems negative     PCSSQ[Palliative Care Spiritual Screening Question]   Severity (0-10):  Score of 4 or > indicate consideration of Chaplaincy referral.  Chaplaincy Referral: [ ] yes [ ] refused [ ] following [x ] Deferred     Caregiver [place]? : [ ] yes [ x] no [ ] Deferred [ ] Declined             Social work referral [ ] Patient & Family Centered Care Referral [ ]     Anticipatory Grief present?:  [ ] yes [ x] no  [ ] Deferred                  Social work referral [ ] Chaplaincy Referral[ ]      PHYSICAL EXAM:  Vital Signs Last 24 Hrs  T(C): 36.7 (24 May 2025 13:18), Max: 37.2 (23 May 2025 17:36)  T(F): 98.1 (24 May 2025 13:18), Max: 99 (23 May 2025 17:36)  HR: 89 (24 May 2025 13:18) (88 - 111)  BP: 134/68 (24 May 2025 13:18) (113/71 - 177/101)  BP(mean): --  RR: 18 (24 May 2025 13:18) (17 - 18)  SpO2: 95% (24 May 2025 13:18) (93% - 96%)    Parameters below as of 24 May 2025 13:18  Patient On (Oxygen Delivery Method): nasal cannula  O2 Flow (L/min): 2   I&O's Summary    23 May 2025 07:01  -  24 May 2025 07:00  --------------------------------------------------------  IN: 4701 mL / OUT: 625 mL / NET: 4076 mL    24 May 2025 07:01  -  24 May 2025 13:19  --------------------------------------------------------  IN: 240 mL / OUT: 350 mL / NET: -110 mL       GENERAL: [ ]Cachexia    [x ]Alert  [x ]Oriented x 3  [ ]Lethargic  [ ]Unarousable  [x ]Verbal  [ ]Non-Verbal  Behavioral:   [ ] Anxiety  [ ] Delirium [ ] Agitation [ ] Other  HEENT:  [ ]Normal   [ ]Dry mouth   [ ]ET Tube/Trach  [ ]Oral lesions [x] sclera icteric  PULMONARY:   [x ]Clear [ ]Tachypnea  [ ]Audible excessive secretions   [ ]Rhonchi        [ ]Right [ ]Left [ ]Bilateral  [ ]Crackles        [ ]Right [ ]Left [ ]Bilateral  [ ]Wheezing     [ ]Right [ ]Left [ ]Bilateral  [ ]Diminished breath sounds [ ]right [ ]left [ ]bilateral  CARDIOVASCULAR:    [ x]Regular [ ]Irregular [ ]Tachy  [ ]Dionte [ ]Murmur [ ]Other  GASTROINTESTINAL:  [x ]Soft  [ ]Distended   [ ]+BS  [ ]Non tender [x ]Tender  [ ]Other [ ]PEG [ ]OGT/ NGT  Last BM: 5/18?  GENITOURINARY:  [ x]Normal [ ] Incontinent   [ ]Oliguria/Anuria   [ ]Rivera  MUSCULOSKELETAL:   [ x]Normal   [ ]Weakness  [ ]Bed/Wheelchair bound [ ]Edema  NEUROLOGIC:   [ x]No focal deficits  [ ]Cognitive impairment  [ ]Dysphagia [ ]Dysarthria [ ]Paresis [ ]Other   SKIN:   [ ]Normal  [ ]Rash  [x ]Other diffusely jaundiced  [ ]Pressure ulcer(s)       Present on admission [ ]y [ ]n    CRITICAL CARE:  [ ]Shock Present  [ ]Septic [ ]Cardiogenic [ ]Neurologic [ ]Hypovolemic  [ ]Vasopressors [ ]Inotropes  [ ]Respiratory failure present [ ]Mechanical Ventilation [ ]Non-invasive ventilatory support [ ]High-Flow   [ ]Acute  [ ]Chronic [ ]Hypoxic  [ ]Hypercarbic [ ]Other  [ ]Other organ failure     LABS:                        9.5    10.70 )-----------( 414      ( 24 May 2025 07:12 )             32.1   05-24    133[L]  |  97  |  25[H]  ----------------------------<  231[H]  4.0   |  22  |  0.95    Ca    10.7[H]      24 May 2025 07:12  Phos  3.8     05-24  Mg     2.1     05-24    TPro  6.0  /  Alb  2.8[L]  /  TBili  6.1[H]  /  DBili  x   /  AST  128[H]  /  ALT  137[H]  /  AlkPhos  2045[H]  05-24  PT/INR - ( 23 May 2025 00:55 )   PT: 16.4 sec;   INR: 1.44 ratio         PTT - ( 23 May 2025 00:55 )  PTT:34.8 sec    Urinalysis Basic - ( 24 May 2025 07:12 )    Color: x / Appearance: x / SG: x / pH: x  Gluc: 231 mg/dL / Ketone: x  / Bili: x / Urobili: x   Blood: x / Protein: x / Nitrite: x   Leuk Esterase: x / RBC: x / WBC x   Sq Epi: x / Non Sq Epi: x / Bacteria: x      RADIOLOGY & ADDITIONAL STUDIES:    Protein Calorie Malnutrition Present: [ ]mild [ ]moderate [ ]severe [ ]underweight [ ]morbid obesity  https://www.andeal.org/vault/2440/web/files/ONC/Table_Clinical%20Characteristics%20to%20Document%20Malnutrition-White%20JV%20et%20al%202012.pdf    Height (cm): 166 (05-22-25 @ 19:55), 167.6 (05-21-25 @ 20:37), 167.6 (05-06-25 @ 07:55)  Weight (kg): 121.6 (05-22-25 @ 19:55), 120 (05-21-25 @ 20:37), 123.4 (05-06-25 @ 07:55)  BMI (kg/m2): 44.1 (05-22-25 @ 19:55), 42.7 (05-21-25 @ 20:37), 43.9 (05-06-25 @ 07:55)    [ ]PPSV2 < or = 30%  [ ]significant weight loss [ ]poor nutritional intake [ ]anasarca [ ]Artificial Nutrition    Other REFERRALS:  [ ]Hospice  [ ]Child Life  [ ]Social Work  [ ]Case management [ ]Holistic Therapy

## 2025-05-24 NOTE — PROGRESS NOTE ADULT - PROBLEM SELECTOR PLAN 3
-GI discussed with Dr. Justice at [facility], ERCP was held, pt was resumed on diet.   -Grade 3 transaminitis. Trend liver enzymes daily -GI discussed with Dr. Justice at [facility], ERCP was held, pt was resumed on diet.   Grade 3 transaminitis. Trend liver enzymes daily

## 2025-05-24 NOTE — PROGRESS NOTE ADULT - SUBJECTIVE AND OBJECTIVE BOX
Diagnosis:    Protocol/Chemo Regimen:    Day:     Pt endorsed:    Review of Systems:     Pain scale:     Diet:     Allergies    azithromycin (Unknown)  Januvia (Rash)  Pepcid (Hives; Rash)    Intolerances        ANTIMICROBIALS      HEME/ONC MEDICATIONS  heparin   Injectable 5000 Unit(s) SubCutaneous every 8 hours      STANDING MEDICATIONS  albuterol/ipratropium for Nebulization 3 milliLiter(s) Nebulizer every 6 hours  allopurinol 300 milliGRAM(s) Oral daily  amLODIPine   Tablet 10 milliGRAM(s) Oral daily  dextrose 5%. 1000 milliLiter(s) IV Continuous <Continuous>  dextrose 5%. 1000 milliLiter(s) IV Continuous <Continuous>  dextrose 50% Injectable 25 Gram(s) IV Push once  dextrose 50% Injectable 12.5 Gram(s) IV Push once  dextrose 50% Injectable 25 Gram(s) IV Push once  filgrastim-sndz (ZARXIO) Injectable 480 MICROGram(s) SubCutaneous daily  glucagon  Injectable 1 milliGRAM(s) IntraMuscular once  insulin glargine Injectable (LANTUS) 10 Unit(s) SubCutaneous at bedtime  insulin lispro (ADMELOG) corrective regimen sliding scale   SubCutaneous three times a day before meals  insulin lispro (ADMELOG) corrective regimen sliding scale   SubCutaneous at bedtime  pantoprazole    Tablet 40 milliGRAM(s) Oral before breakfast  phytonadione   Solution 5 milliGRAM(s) Oral daily  polyethylene glycol 3350 17 Gram(s) Oral daily  predniSONE   Tablet 100 milliGRAM(s) Oral every 24 hours  senna 2 Tablet(s) Oral at bedtime  sertraline 50 milliGRAM(s) Oral daily  sodium chloride 0.9%. 1000 milliLiter(s) IV Continuous <Continuous>      PRN MEDICATIONS  acetaminophen     Tablet .. 650 milliGRAM(s) Oral every 6 hours PRN  aluminum hydroxide/magnesium hydroxide/simethicone Suspension 30 milliLiter(s) Oral every 4 hours PRN  dextrose Oral Gel 15 Gram(s) Oral once PRN  HYDROmorphone  Injectable 0.5 milliGRAM(s) IV Push every 3 hours PRN  HYDROmorphone  Injectable 0.2 milliGRAM(s) IV Push every 3 hours PRN  melatonin 3 milliGRAM(s) Oral at bedtime PRN  metoclopramide Injectable 10 milliGRAM(s) IV Push every 6 hours PRN  ondansetron Injectable 4 milliGRAM(s) IV Push every 8 hours PRN  oxyCODONE    IR 10 milliGRAM(s) Oral every 8 hours PRN        Vital Signs Last 24 Hrs  T(C): 36.8 (24 May 2025 05:14), Max: 37.2 (23 May 2025 17:36)  T(F): 98.3 (24 May 2025 05:14), Max: 99 (23 May 2025 17:36)  HR: 90 (24 May 2025 05:14) (88 - 111)  BP: 147/82 (24 May 2025 05:14) (113/71 - 177/101)  BP(mean): --  RR: 18 (24 May 2025 05:14) (17 - 18)  SpO2: 95% (24 May 2025 05:14) (93% - 96%)    Parameters below as of 24 May 2025 05:14  Patient On (Oxygen Delivery Method): nasal cannula  O2 Flow (L/min): 2      PHYSICAL EXAM  General: NAD  HEENT: PERRLA, EOMOI, clear oropharynx, anicteric sclera, pink conjunctiva  Neck: supple  CV: (+) S1/S2 RRR  Lungs: clear to auscultation, no wheezes or rales  Abdomen: soft, non-tender, non-distended (+) BS  Ext: no clubbing, cyanosis or edema  Skin: no rashes and no petechiae  Neuro: alert and oriented X 3, no focal deficits  Central Line:     RECENT CULTURES:  05-22 @ 00:45  Clean Catch  --  --  --    <10,000 CFU/mL Normal Urogenital Cintia  --        LABS:                        10.0   10.06 )-----------( 421      ( 23 May 2025 10:23 )             33.2         Mean Cell Volume : 72.5 fl  Mean Cell Hemoglobin : 21.8 pg  Mean Cell Hemoglobin Concentration : 30.1 g/dL  Auto Neutrophil # : x  Auto Lymphocyte # : x  Auto Monocyte # : x  Auto Eosinophil # : x  Auto Basophil # : x  Auto Neutrophil % : x  Auto Lymphocyte % : x  Auto Monocyte % : x  Auto Eosinophil % : x  Auto Basophil % : x      05-23    130[L]  |  95[L]  |  22  ----------------------------<  186[H]  3.8   |  21[L]  |  0.95    Ca    10.7[H]      23 May 2025 22:43  Phos  3.0     05-23  Mg     1.9     05-23    TPro  6.1  /  Alb  2.9[L]  /  TBili  9.2[H]  /  DBili  x   /  AST  229[H]  /  ALT  161[H]  /  AlkPhos  2465[H]  05-23      Mg 1.9  Phos 3.0  Mg 2.0  Phos 3.3      PT/INR - ( 23 May 2025 00:55 )   PT: 16.4 sec;   INR: 1.44 ratio         PTT - ( 23 May 2025 00:55 )  PTT:34.8 sec      Uric Acid 3.2      Uric Acid 4.3        RADIOLOGY & ADDITIONAL STUDIES:           Diagnosis: DLBCL non germinal center     Protocol/Chemo Regimen: Cytoxan and Rituximab     Day: 2    Subjective:   abdominal distention improved    Review of Systems: Denies nausea, vomiting, diarrhea, chest pain, SOB     Pain scale:  -    Diet: Regular     Allergies  azithromycin (Unknown)  Januvia (Rash)  Pepcid (Hives; Rash)    HEME/ONC MEDICATIONS  heparin   Injectable 5000 Unit(s) SubCutaneous every 8 hours    STANDING MEDICATIONS  albuterol/ipratropium for Nebulization 3 milliLiter(s) Nebulizer every 6 hours  allopurinol 300 milliGRAM(s) Oral daily  amLODIPine   Tablet 10 milliGRAM(s) Oral daily  dextrose 5%. 1000 milliLiter(s) IV Continuous <Continuous>  dextrose 5%. 1000 milliLiter(s) IV Continuous <Continuous>  dextrose 50% Injectable 25 Gram(s) IV Push once  dextrose 50% Injectable 12.5 Gram(s) IV Push once  dextrose 50% Injectable 25 Gram(s) IV Push once  filgrastim-sndz (ZARXIO) Injectable 480 MICROGram(s) SubCutaneous daily  glucagon  Injectable 1 milliGRAM(s) IntraMuscular once  insulin glargine Injectable (LANTUS) 10 Unit(s) SubCutaneous at bedtime  insulin lispro (ADMELOG) corrective regimen sliding scale   SubCutaneous three times a day before meals  insulin lispro (ADMELOG) corrective regimen sliding scale   SubCutaneous at bedtime  pantoprazole    Tablet 40 milliGRAM(s) Oral before breakfast  phytonadione   Solution 5 milliGRAM(s) Oral daily  polyethylene glycol 3350 17 Gram(s) Oral daily  predniSONE   Tablet 100 milliGRAM(s) Oral every 24 hours  senna 2 Tablet(s) Oral at bedtime  sertraline 50 milliGRAM(s) Oral daily  sodium chloride 0.9%. 1000 milliLiter(s) IV Continuous <Continuous>    PRN MEDICATIONS  acetaminophen     Tablet .. 650 milliGRAM(s) Oral every 6 hours PRN  aluminum hydroxide/magnesium hydroxide/simethicone Suspension 30 milliLiter(s) Oral every 4 hours PRN  dextrose Oral Gel 15 Gram(s) Oral once PRN  HYDROmorphone  Injectable 0.5 milliGRAM(s) IV Push every 3 hours PRN  HYDROmorphone  Injectable 0.2 milliGRAM(s) IV Push every 3 hours PRN  melatonin 3 milliGRAM(s) Oral at bedtime PRN  metoclopramide Injectable 10 milliGRAM(s) IV Push every 6 hours PRN  ondansetron Injectable 4 milliGRAM(s) IV Push every 8 hours PRN  oxyCODONE    IR 10 milliGRAM(s) Oral every 8 hours PRN    Vital Signs Last 24 Hrs  T(C): 36.8 (24 May 2025 05:14), Max: 37.2 (23 May 2025 17:36)  T(F): 98.3 (24 May 2025 05:14), Max: 99 (23 May 2025 17:36)  HR: 90 (24 May 2025 05:14) (88 - 111)  BP: 147/82 (24 May 2025 05:14) (113/71 - 177/101)  BP(mean): --  RR: 18 (24 May 2025 05:14) (17 - 18)  SpO2: 95% (24 May 2025 05:14) (93% - 96%)    Parameters below as of 24 May 2025 05:14  Patient On (Oxygen Delivery Method): nasal cannula  O2 Flow (L/min): 2    PHYSICAL EXAM  General: adult in NAD  HEENT: clear oropharynx, no erythema, no ulcers; bilateral icteric sclera   Neck: supple  CV: normal S1, S2, RRR  Lungs: clear to auscultation, no wheezes, no rales  Abdomen: soft, nontender, nondistended, normal BS  Ext: no edema  Skin: no rash  Neuro: alert and oriented x 3    RECENT CULTURES:  05-22 @ 00:45  Clean Catch  <10,000 CFU/mL Normal Urogenital Cintia    LABS:                       9.5    10.70 )-----------( 414      ( 24 May 2025 07:12 )             32.1     Mean Cell Volume : 73.8 fl  Mean Cell Hemoglobin : 21.8 pg  Mean Cell Hemoglobin Concentration : 29.6 g/dL  Auto Neutrophil # : x  Auto Lymphocyte # : x  Auto Monocyte # : x  Auto Eosinophil # : x  Auto Basophil # : x  Auto Neutrophil % : x  Auto Lymphocyte % : x  Auto Monocyte % : x  Auto Eosinophil % : x  Auto Basophil % : x    05-24    133[L]  |  97  |  25[H]  ----------------------------<  231[H]  4.0   |  22  |  0.95    Ca    10.7[H]      24 May 2025 07:12  Phos  3.8     05-24  Mg     2.1     05-24    TPro  6.0  /  Alb  2.8[L]  /  TBili  6.1[H]  /  DBili  x   /  AST  128[H]  /  ALT  137[H]  /  AlkPhos  2045[H]  05-24    Mg 2.1  Phos 3.8  Mg 1.9  Phos 3.0    PT/INR - ( 23 May 2025 00:55 )   PT: 16.4 sec;   INR: 1.44 ratio    PTT - ( 23 May 2025 00:55 )  PTT:34.8 sec    Uric Acid 3.3      Uric Acid 3.2    RADIOLOGY & ADDITIONAL STUDIES:  CT Chest No Cont (05.23.25 @ 14:47) >  Lymphadenopathy involving the cervical, mediastinal, hilar and   retrocrural lymph nodes, consistent with known lymphoma.  Small right pleural effusion.     Diagnosis: DLBCL non germinal center     Protocol/Chemo Regimen: Cytoxan and Rituximab     Day: 2    Subjective:   abdominal distention improved    Review of Systems: Denies nausea, vomiting, diarrhea, chest pain, SOB     Pain scale:  abdominal pain    Diet: Regular     Allergies  azithromycin (Unknown)  Januvia (Rash)  Pepcid (Hives; Rash)    HEME/ONC MEDICATIONS  heparin   Injectable 5000 Unit(s) SubCutaneous every 8 hours    STANDING MEDICATIONS  albuterol/ipratropium for Nebulization 3 milliLiter(s) Nebulizer every 6 hours  allopurinol 300 milliGRAM(s) Oral daily  amLODIPine   Tablet 10 milliGRAM(s) Oral daily  dextrose 5%. 1000 milliLiter(s) IV Continuous <Continuous>  dextrose 5%. 1000 milliLiter(s) IV Continuous <Continuous>  dextrose 50% Injectable 25 Gram(s) IV Push once  dextrose 50% Injectable 12.5 Gram(s) IV Push once  dextrose 50% Injectable 25 Gram(s) IV Push once  filgrastim-sndz (ZARXIO) Injectable 480 MICROGram(s) SubCutaneous daily  glucagon  Injectable 1 milliGRAM(s) IntraMuscular once  insulin glargine Injectable (LANTUS) 10 Unit(s) SubCutaneous at bedtime  insulin lispro (ADMELOG) corrective regimen sliding scale   SubCutaneous three times a day before meals  insulin lispro (ADMELOG) corrective regimen sliding scale   SubCutaneous at bedtime  pantoprazole    Tablet 40 milliGRAM(s) Oral before breakfast  phytonadione   Solution 5 milliGRAM(s) Oral daily  polyethylene glycol 3350 17 Gram(s) Oral daily  predniSONE   Tablet 100 milliGRAM(s) Oral every 24 hours  senna 2 Tablet(s) Oral at bedtime  sertraline 50 milliGRAM(s) Oral daily  sodium chloride 0.9%. 1000 milliLiter(s) IV Continuous <Continuous>    PRN MEDICATIONS  acetaminophen     Tablet .. 650 milliGRAM(s) Oral every 6 hours PRN  aluminum hydroxide/magnesium hydroxide/simethicone Suspension 30 milliLiter(s) Oral every 4 hours PRN  dextrose Oral Gel 15 Gram(s) Oral once PRN  HYDROmorphone  Injectable 0.5 milliGRAM(s) IV Push every 3 hours PRN  HYDROmorphone  Injectable 0.2 milliGRAM(s) IV Push every 3 hours PRN  melatonin 3 milliGRAM(s) Oral at bedtime PRN  metoclopramide Injectable 10 milliGRAM(s) IV Push every 6 hours PRN  ondansetron Injectable 4 milliGRAM(s) IV Push every 8 hours PRN  oxyCODONE    IR 10 milliGRAM(s) Oral every 8 hours PRN    Vital Signs Last 24 Hrs  T(C): 36.8 (24 May 2025 05:14), Max: 37.2 (23 May 2025 17:36)  T(F): 98.3 (24 May 2025 05:14), Max: 99 (23 May 2025 17:36)  HR: 90 (24 May 2025 05:14) (88 - 111)  BP: 147/82 (24 May 2025 05:14) (113/71 - 177/101)  BP(mean): --  RR: 18 (24 May 2025 05:14) (17 - 18)  SpO2: 95% (24 May 2025 05:14) (93% - 96%)    Parameters below as of 24 May 2025 05:14  Patient On (Oxygen Delivery Method): nasal cannula  O2 Flow (L/min): 2    PHYSICAL EXAM  General: adult in NAD  HEENT: clear oropharynx, no erythema, no ulcers; bilateral icteric sclera   CV: normal S1, S2, RRR  Lungs: clear to auscultation, no wheezes, no rales  Abdomen: soft, nontender, distended, normal BS  Ext: no edema   Skin: no rash  Neuro: alert and oriented x 3  IV: PIVL, CDI    RECENT CULTURES:  05-22 @ 00:45  Clean Catch  <10,000 CFU/mL Normal Urogenital Cintia    LABS:                       9.5    10.70 )-----------( 414      ( 24 May 2025 07:12 )             32.1     Mean Cell Volume : 73.8 fl  Mean Cell Hemoglobin : 21.8 pg  Mean Cell Hemoglobin Concentration : 29.6 g/dL  Auto Neutrophil # : x  Auto Lymphocyte # : x  Auto Monocyte # : x  Auto Eosinophil # : x  Auto Basophil # : x  Auto Neutrophil % : x  Auto Lymphocyte % : x  Auto Monocyte % : x  Auto Eosinophil % : x  Auto Basophil % : x    05-24    133[L]  |  97  |  25[H]  ----------------------------<  231[H]  4.0   |  22  |  0.95    Ca    10.7[H]      24 May 2025 07:12  Phos  3.8     05-24  Mg     2.1     05-24    TPro  6.0  /  Alb  2.8[L]  /  TBili  6.1[H]  /  DBili  x   /  AST  128[H]  /  ALT  137[H]  /  AlkPhos  2045[H]  05-24    Mg 2.1  Phos 3.8  Mg 1.9  Phos 3.0    PT/INR - ( 23 May 2025 00:55 )   PT: 16.4 sec;   INR: 1.44 ratio    PTT - ( 23 May 2025 00:55 )  PTT:34.8 sec    Uric Acid 3.3      Uric Acid 3.2    RADIOLOGY & ADDITIONAL STUDIES:  CT Chest No Cont (05.23.25 @ 14:47) >  Lymphadenopathy involving the cervical, mediastinal, hilar and   retrocrural lymph nodes, consistent with known lymphoma.  Small right pleural effusion.

## 2025-05-25 LAB
ADD ON TEST-SPECIMEN IN LAB: SIGNIFICANT CHANGE UP
ALBUMIN SERPL ELPH-MCNC: 2.8 G/DL — LOW (ref 3.3–5)
ALP SERPL-CCNC: 2011 U/L — HIGH (ref 40–120)
ALT FLD-CCNC: 124 U/L — HIGH (ref 10–45)
ANION GAP SERPL CALC-SCNC: 12 MMOL/L — SIGNIFICANT CHANGE UP (ref 5–17)
AST SERPL-CCNC: 110 U/L — HIGH (ref 10–40)
BASOPHILS # BLD AUTO: 0 K/UL — SIGNIFICANT CHANGE UP (ref 0–0.2)
BASOPHILS NFR BLD AUTO: 0 % — SIGNIFICANT CHANGE UP (ref 0–2)
BILIRUB SERPL-MCNC: 4.2 MG/DL — HIGH (ref 0.2–1.2)
BUN SERPL-MCNC: 25 MG/DL — HIGH (ref 7–23)
CALCIUM SERPL-MCNC: 10.2 MG/DL — SIGNIFICANT CHANGE UP (ref 8.4–10.5)
CHLORIDE SERPL-SCNC: 99 MMOL/L — SIGNIFICANT CHANGE UP (ref 96–108)
CO2 SERPL-SCNC: 23 MMOL/L — SIGNIFICANT CHANGE UP (ref 22–31)
CREAT SERPL-MCNC: 0.94 MG/DL — SIGNIFICANT CHANGE UP (ref 0.5–1.3)
EGFR: 72 ML/MIN/1.73M2 — SIGNIFICANT CHANGE UP
EGFR: 72 ML/MIN/1.73M2 — SIGNIFICANT CHANGE UP
EOSINOPHIL # BLD AUTO: 0 K/UL — SIGNIFICANT CHANGE UP (ref 0–0.5)
EOSINOPHIL NFR BLD AUTO: 0 % — SIGNIFICANT CHANGE UP (ref 0–6)
GIANT PLATELETS BLD QL SMEAR: PRESENT — SIGNIFICANT CHANGE UP
GLUCOSE BLDC GLUCOMTR-MCNC: 126 MG/DL — HIGH (ref 70–99)
GLUCOSE BLDC GLUCOMTR-MCNC: 137 MG/DL — HIGH (ref 70–99)
GLUCOSE BLDC GLUCOMTR-MCNC: 209 MG/DL — HIGH (ref 70–99)
GLUCOSE BLDC GLUCOMTR-MCNC: 221 MG/DL — HIGH (ref 70–99)
GLUCOSE SERPL-MCNC: 150 MG/DL — HIGH (ref 70–99)
HCT VFR BLD CALC: 31.7 % — LOW (ref 34.5–45)
HGB BLD-MCNC: 9.4 G/DL — LOW (ref 11.5–15.5)
LDH SERPL L TO P-CCNC: 247 U/L — HIGH (ref 50–242)
LYMPHOCYTES # BLD AUTO: 0 % — LOW (ref 13–44)
LYMPHOCYTES # BLD AUTO: 0 K/UL — LOW (ref 1–3.3)
MAGNESIUM SERPL-MCNC: 2 MG/DL — SIGNIFICANT CHANGE UP (ref 1.6–2.6)
MANUAL SMEAR VERIFICATION: SIGNIFICANT CHANGE UP
MCHC RBC-ENTMCNC: 22.4 PG — LOW (ref 27–34)
MCHC RBC-ENTMCNC: 29.7 G/DL — LOW (ref 32–36)
MCV RBC AUTO: 75.5 FL — LOW (ref 80–100)
MONOCYTES # BLD AUTO: 0.31 K/UL — SIGNIFICANT CHANGE UP (ref 0–0.9)
MONOCYTES NFR BLD AUTO: 0.9 % — LOW (ref 2–14)
NEUTROPHILS # BLD AUTO: 34.56 K/UL — HIGH (ref 1.8–7.4)
NEUTROPHILS NFR BLD AUTO: 85.2 % — HIGH (ref 43–77)
NEUTS BAND # BLD: 13.9 % — HIGH (ref 0–8)
NEUTS BAND NFR BLD: 13.9 % — HIGH (ref 0–8)
PHOSPHATE SERPL-MCNC: 3.8 MG/DL — SIGNIFICANT CHANGE UP (ref 2.5–4.5)
PLAT MORPH BLD: ABNORMAL
PLATELET # BLD AUTO: 435 K/UL — HIGH (ref 150–400)
POTASSIUM SERPL-MCNC: 3.9 MMOL/L — SIGNIFICANT CHANGE UP (ref 3.5–5.3)
POTASSIUM SERPL-SCNC: 3.9 MMOL/L — SIGNIFICANT CHANGE UP (ref 3.5–5.3)
PROT SERPL-MCNC: 5.8 G/DL — LOW (ref 6–8.3)
RBC # BLD: 4.2 M/UL — SIGNIFICANT CHANGE UP (ref 3.8–5.2)
RBC # FLD: 26.7 % — HIGH (ref 10.3–14.5)
RBC BLD AUTO: SIGNIFICANT CHANGE UP
SODIUM SERPL-SCNC: 134 MMOL/L — LOW (ref 135–145)
URATE SERPL-MCNC: 2.8 MG/DL — SIGNIFICANT CHANGE UP (ref 2.5–7)
WBC # BLD: 34.87 K/UL — HIGH (ref 3.8–10.5)
WBC # FLD AUTO: 34.87 K/UL — HIGH (ref 3.8–10.5)

## 2025-05-25 PROCEDURE — 99233 SBSQ HOSP IP/OBS HIGH 50: CPT

## 2025-05-25 RX ORDER — ONDANSETRON HCL/PF 4 MG/2 ML
8 VIAL (ML) INJECTION EVERY 8 HOURS
Refills: 0 | Status: DISCONTINUED | OUTPATIENT
Start: 2025-05-25 | End: 2025-05-27

## 2025-05-25 RX ORDER — FUROSEMIDE 10 MG/ML
40 INJECTION INTRAMUSCULAR; INTRAVENOUS
Refills: 0 | Status: COMPLETED | OUTPATIENT
Start: 2025-05-26 | End: 2025-05-26

## 2025-05-25 RX ORDER — FUROSEMIDE 10 MG/ML
40 INJECTION INTRAMUSCULAR; INTRAVENOUS ONCE
Refills: 0 | Status: COMPLETED | OUTPATIENT
Start: 2025-05-25 | End: 2025-05-25

## 2025-05-25 RX ADMIN — Medication 40 MILLIGRAM(S): at 06:53

## 2025-05-25 RX ADMIN — POLYETHYLENE GLYCOL 3350 17 GRAM(S): 17 POWDER, FOR SOLUTION ORAL at 11:02

## 2025-05-25 RX ADMIN — IPRATROPIUM BROMIDE AND ALBUTEROL SULFATE 3 MILLILITER(S): .5; 2.5 SOLUTION RESPIRATORY (INHALATION) at 11:00

## 2025-05-25 RX ADMIN — INSULIN GLARGINE-YFGN 10 UNIT(S): 100 INJECTION, SOLUTION SUBCUTANEOUS at 22:32

## 2025-05-25 RX ADMIN — HEPARIN SODIUM 5000 UNIT(S): 1000 INJECTION INTRAVENOUS; SUBCUTANEOUS at 21:11

## 2025-05-25 RX ADMIN — Medication 0.5 MILLIGRAM(S): at 13:27

## 2025-05-25 RX ADMIN — OXYCODONE HYDROCHLORIDE 10 MILLIGRAM(S): 30 TABLET ORAL at 15:16

## 2025-05-25 RX ADMIN — Medication 0.5 MILLIGRAM(S): at 21:15

## 2025-05-25 RX ADMIN — Medication 0.5 MILLIGRAM(S): at 07:01

## 2025-05-25 RX ADMIN — OXYCODONE HYDROCHLORIDE 10 MILLIGRAM(S): 30 TABLET ORAL at 07:01

## 2025-05-25 RX ADMIN — Medication 0.5 MILLIGRAM(S): at 17:24

## 2025-05-25 RX ADMIN — Medication 300 MILLIGRAM(S): at 11:00

## 2025-05-25 RX ADMIN — OXYCODONE HYDROCHLORIDE 10 MILLIGRAM(S): 30 TABLET ORAL at 15:46

## 2025-05-25 RX ADMIN — Medication 0.5 MILLIGRAM(S): at 08:48

## 2025-05-25 RX ADMIN — FUROSEMIDE 40 MILLIGRAM(S): 10 INJECTION INTRAMUSCULAR; INTRAVENOUS at 11:00

## 2025-05-25 RX ADMIN — AMLODIPINE BESYLATE 10 MILLIGRAM(S): 10 TABLET ORAL at 06:53

## 2025-05-25 RX ADMIN — Medication 0.5 MILLIGRAM(S): at 09:18

## 2025-05-25 RX ADMIN — Medication 0.5 MILLIGRAM(S): at 17:54

## 2025-05-25 RX ADMIN — IPRATROPIUM BROMIDE AND ALBUTEROL SULFATE 3 MILLILITER(S): .5; 2.5 SOLUTION RESPIRATORY (INHALATION) at 17:58

## 2025-05-25 RX ADMIN — HEPARIN SODIUM 5000 UNIT(S): 1000 INJECTION INTRAVENOUS; SUBCUTANEOUS at 14:11

## 2025-05-25 RX ADMIN — FILGRASTIM 480 MICROGRAM(S): 300 INJECTION, SOLUTION INTRAVENOUS; SUBCUTANEOUS at 11:00

## 2025-05-25 RX ADMIN — OXYCODONE HYDROCHLORIDE 10 MILLIGRAM(S): 30 TABLET ORAL at 06:53

## 2025-05-25 RX ADMIN — Medication 75 MILLILITER(S): at 10:59

## 2025-05-25 RX ADMIN — PREDNISONE 100 MILLIGRAM(S): 20 TABLET ORAL at 11:02

## 2025-05-25 RX ADMIN — HEPARIN SODIUM 5000 UNIT(S): 1000 INJECTION INTRAVENOUS; SUBCUTANEOUS at 07:00

## 2025-05-25 RX ADMIN — Medication 2 TABLET(S): at 21:11

## 2025-05-25 RX ADMIN — IPRATROPIUM BROMIDE AND ALBUTEROL SULFATE 3 MILLILITER(S): .5; 2.5 SOLUTION RESPIRATORY (INHALATION) at 06:55

## 2025-05-25 RX ADMIN — SERTRALINE 50 MILLIGRAM(S): 100 TABLET, FILM COATED ORAL at 11:00

## 2025-05-25 RX ADMIN — Medication 5 MILLIGRAM(S): at 11:00

## 2025-05-25 RX ADMIN — INSULIN LISPRO 2: 100 INJECTION, SOLUTION INTRAVENOUS; SUBCUTANEOUS at 18:17

## 2025-05-25 RX ADMIN — Medication 0.5 MILLIGRAM(S): at 12:57

## 2025-05-25 NOTE — CHART NOTE - NSCHARTNOTEFT_GEN_A_CORE
Chart reviewed. Over the past 24 hours, patient required 0.5mg IV dilaudid x4.    54F who presented to [facility] for jaundice and diarrhea x1wk, green in color; also reported malaise, anorexia, n/v (NBNB emesis), and 70 lb unintentional wt loss in 1-2wk. CT a/p showed moderate intra/extrahepatic biliary ductal dilation w/ abrupt cutoff in CBD at the hilar with soft tissue measuring up to 4x4cm c/f lymph node mass, enlarged RP and retrocrural LAD, urinary bladder wall thickening, mild/mod R side pleural effusion, HSM w/ lesions, c/f mets. Pt with newly diagnosed DLBCL and is transferred to [facility] for inpatient chemo. Geriatrics and Palliative Medicine Team is consulted for cancer related pain    Recommendations:   -  oxycodone 10 mg q8 hours prn mod pain (pt takes this at home for chronic back pain)  - IV dilaudid 0.5 mg q3 hours prn severe pain  - IV dilaudid 0.2 mg q3 hours prn breakthrough pain  - bowel regimen while on opioids   - narcan prn.    In the event of newly developing, evolving, or worsening symptoms, please contact the Palliative Medicine team via pager (if the patient is at [facility] #[phone] or if the patient is at [facility] #[phone]) The Geriatric and Palliative Medicine service has coverage 24 hours a day/ 7 days a week to provide medical recommendations regarding symptom management needs via telephone.
This report was requested by: Yesenia Guzman | Reference #: [id number]    Practitioner Count: 3  Pharmacy Count: 1  Current Opioid Prescriptions: 1  Current Benzodiazepine Prescriptions: 1  Current Stimulant Prescriptions: 0      Patient Demographic Information (PDI)       PDI	First Name	Last Name	Birth Date	Gender	Street Address	City	State	Zip Code  A	Kathy Yadav	1970	Female	[address] [facility]	[address]  B	Kathy Yadav	1970	Female	[address] [place]	[facility]	[address]    Prescription Information      PDI Filter:    PDI	My Rx	Current Rx	Drug Type	Rx Written	Rx Dispensed	Drug	Quantity	Days Supply	Prescriber Name	Prescriber AMY #	Payment Method	Dispenser  A	N	N	O	12/02/2024	12/02/2024	oxycodone hcl (ir) 10 mg tab	10	5	Jarad Crook	[id number]	Medicaid	[facility]  A	N	N	O	10/16/2024	10/16/2024	oxycodone-acetaminophen  mg tablet	60	20	Hermilo Leger	[id number]	Medicaid	[facility]  A	N	N	B	09/27/2024	10/05/2024	alprazolam 0.5 mg tablet	30	30	Hermilo Leger	[id number]	Medicaid	[facility]  A	N	N	O	09/27/2024	09/27/2024	oxycodone-acetaminophen  mg tablet	45	15	Hermilo Leger	[id number]	Medicaid	[facility]  A	N	N	B	08/29/2024	08/29/2024	alprazolam 0.5 mg tablet	30	30	Hermilo Leger	[id number]	Insurance	[facility]  A	N	N	O	08/29/2024	08/29/2024	oxycodone-acetaminophen  mg tablet	45	15	Hermilo Leger	[id number]	Ins[facility] [address] [place] [address] [facility]  A	N	N	B	07/31/2024	07/31/2024	alprazolam 0.5 mg tablet	30	30	Hermilo Leger	[id number]	Medicaid	[facility]  A	N	N	O	07/31/2024	07/31/2024	oxycodone-acetaminophen  mg tablet	45	15	Hermilo Leger	[id number]	Medicaid	[facility]  A	N	N	B	06/28/2024	06/28/2024	alprazolam 0.5 mg tablet	30	30	Hermilo Leger	[id number]	Medicaid	[facility] [address] [place] [address] Pharmacy  A	N	N	O	06/28/2024	06/28/2024	oxycodone-acetaminophen  mg tablet	45	15	Hermilo Leger	[id number]	Medicaid	[facility] [address] [place] [address] Pharmacy  A	N	N	B	05/21/2024	05/31/2024	alprazolam 0.5 mg tablet	30	30	Hermilo Leger	[id number]	[facility] [address] [place] [address] Pharmacy  B	N	Y	O	05/15/2025	05/19/2025	oxycodone-acetaminophen  mg tab	12	4	Aline Easley	[id number]	[facility] #[id number]  B	N	N	O	05/02/2025	05/02/2025	oxycodone-acetaminophen  mg tab	60	20	Hermilo Leger	[id number]	Medicaid	[facility] #[id number]  B	N	N	O	04/29/2025	04/29/2025	oxycodone hcl (ir) 5 mg cap	5	5	[facility]	[id number]	[facility] #[id number]  B	N	Y	B	04/12/2025	04/27/2025	alprazolam 0.5 mg tablet	30	30	Hermilo Legre	[id number]	Medicaid	[facility] #[id number]  B	N	N	O	04/12/2025	04/12/2025	oxycodone-acetaminophen  mg tab	60	20	Hermilo Leger	[id number]	Medicaid	[facility] #[id number]  B	N	N	O	02/28/2025	02/28/2025	oxycodone-acetaminophen  mg tab	60	20	Hermilo Leger	[id number]	Medicaid	[facility] #[id number]  B	N	N	B	02/08/2025	02/22/2025	alprazolam 0.5 mg tablet	30	30	Hermilo Leger	[id number]	Medicaid	[facility] #[id number]  B	N	N	O	02/08/2025	02/08/2025	oxycodone-acetaminophen  mg tab	60	30	Hermilo Leger	[id number]	Medicaid	[facility] #[id number]  B	N	N	B	12/31/2024	01/20/2025	alprazolam 0.5 mg tablet	30	30	Kira [id number]	Medicaid	[facility] #[id number]  B	N	N	O	01/20/2025	01/20/2025	oxycodone-acetaminophen  mg tab	60	20	Hermilo Leger	[id number]	Medicaid	[facility] #[id number]  B	N	N	O	12/31/2024	12/31/2024	oxycodone-acetaminophen  mg tab	60	20	Kira Jasmine	[id number]	Medicaid	[facility] #[id number]  B	N	N	O	12/06/2024	12/07/2024	oxycodone-acetaminophen  mg tab	60	20	Hermilo Leger	[id number]	[facility] #[id number]  B	N	N	B	12/06/2024	12/07/2024	alprazolam 0.5 mg tablet	30	30	Hermilo Leger	[id number]	Medicaid	[facility] #[id number]
Kathy Yadav    54F with PMHx of newly diagnosed diffuse large B-cell lymphoma, scheduled to have PET scan and port placed next week (with Dr. [facility] at [facility] presented to ED with progressively worsening generalized weakness and also noticed to have "yellowing"of the skin. Patient reported itching and juandice x 1 week. Reported constipation with overflow diarrhea x  1 week. Last BM 2 days ago, reported green color stool. Patient reported poor appetite, nausea, and vomiting (NBNB emesis) and  70 lb weight loss in past 1-2 weeks.     CT abd/pelvis : Moderate intra and extrahepatic biliary ductal dilation , new from prior study, with abrupt cutoff in the CBD at the hilar with soft tissue thickening in this region, measuring up to 4 x 4 cm, concerning for lymph node mass.  2.   Enlarged retroperitoneal , retrocrural lymph masses, measuring up to 6 cm, increased since prior study, concerning for metastases, lymphoma, leukemia .  3.   Urinary bladder wall thickening may be due to cystitis. Tiny air locule in the urinary bladder may be due to infectious or intervention.  4.   Mild-to-moderate right pleural effusion with underlying atelectasis and/or infiltrate.  5.  Hepatosplenomegaly with multiple splenic lesions, suspicious for metastasis.     # DLBCL No germinal center   - s/p core biopsy of left periaortic enlarged lymph node onl 05/2025 with path  consistent with Diffuse large B-cell lymphoma, non-germinal center B- cell type. Pending FISH/molceular studies   - T bili  at 10, with direct component at 8.5  with extrnisic compression of CBD likely from lymphadenopathy.       Recommendations:   - Please start prednisone 100mg daily for 5 days   - Please start allopurinol 300mg daily   - Please obtain CT chest w contrast   - Please order: coags, fibrinogen, LDH, uric acid, G6PD, hepatitis B serologies (including core antibody total) and hepatitis C, HIV  - Please order TTE    - Trend TLS labs every 12 hours (CMP, Phos, LDH, uric acid)  - Give rasburicase 3mg IV for uric acid > 8.0  - Plan for chemo: cytoxan and rituximab on 5/23   - Transfuse if Hgb < 7.0. Transfuse if platelets <10K, or <15K if febrile. Transfuse for platelets <50K if bleeding.    Please reach out to hospitalist for admission to [address] [place] [address] overnight. Discussed with daytime HIC     Rafi Lucio MD MS  Hematology/Oncology Fellow PGY-4  [employer] at [facility] | [facility] | [facility]  Available on Teams.

## 2025-05-25 NOTE — PROGRESS NOTE ADULT - PROBLEM SELECTOR PLAN 3
-GI discussed with Dr. Justice at [facility], ERCP was held, pt was resumed on diet.   Grade 3 transaminitis. Trend liver enzymes daily

## 2025-05-25 NOTE — PROGRESS NOTE ADULT - SUBJECTIVE AND OBJECTIVE BOX
Diagnosis: DLBCL non germinal center     Protocol/Chemo Regimen: Cytoxan and Rituximab     Day: 3    Subjective: abdominal distention improved    Review of Systems: Denies nausea, vomiting, diarrhea, chest pain, SOB     Pain scale:  abdominal pain    Diet: Regular     Allergies: azithromycin (Unknown), Januvia (Rash), Pepcid (Hives; Rash)        -----------------------             Diagnosis: DLBCL non germinal center     Protocol/Chemo Regimen: Cytoxan and Rituximab     Day: 3    Subjective: abdominal distention and pain improved    Review of Systems: Denies nausea, vomiting, diarrhea, chest pain, SOB     Pain scale:  abdominal pain    Diet: Consistent carb     Allergies: azithromycin (Unknown), Januvia (Rash), Pepcid (Hives; Rash)    HEME/ONC MEDICATIONS  heparin   Injectable 5000 Unit(s) SubCutaneous every 8 hours    STANDING MEDICATIONS  albuterol/ipratropium for Nebulization 3 milliLiter(s) Nebulizer every 6 hours  allopurinol 300 milliGRAM(s) Oral daily  amLODIPine   Tablet 10 milliGRAM(s) Oral daily  dextrose 5%. 1000 milliLiter(s) IV Continuous <Continuous>  dextrose 5%. 1000 milliLiter(s) IV Continuous <Continuous>  dextrose 50% Injectable 25 Gram(s) IV Push once  dextrose 50% Injectable 12.5 Gram(s) IV Push once  dextrose 50% Injectable 25 Gram(s) IV Push once  filgrastim-sndz (ZARXIO) Injectable 480 MICROGram(s) SubCutaneous daily  glucagon  Injectable 1 milliGRAM(s) IntraMuscular once  insulin glargine Injectable (LANTUS) 10 Unit(s) SubCutaneous at bedtime  insulin lispro (ADMELOG) corrective regimen sliding scale   SubCutaneous three times a day before meals  insulin lispro (ADMELOG) corrective regimen sliding scale   SubCutaneous at bedtime  pantoprazole    Tablet 40 milliGRAM(s) Oral before breakfast  polyethylene glycol 3350 17 Gram(s) Oral daily  predniSONE   Tablet 100 milliGRAM(s) Oral every 24 hours  senna 2 Tablet(s) Oral at bedtime  sertraline 50 milliGRAM(s) Oral daily  sodium chloride 0.9%. 1000 milliLiter(s) IV Continuous <Continuous>    PRN MEDICATIONS  acetaminophen     Tablet .. 650 milliGRAM(s) Oral every 6 hours PRN  aluminum hydroxide/magnesium hydroxide/simethicone Suspension 30 milliLiter(s) Oral every 4 hours PRN  dextrose Oral Gel 15 Gram(s) Oral once PRN  HYDROmorphone  Injectable 0.5 milliGRAM(s) IV Push every 3 hours PRN  HYDROmorphone  Injectable 0.2 milliGRAM(s) IV Push every 3 hours PRN  melatonin 3 milliGRAM(s) Oral at bedtime PRN  metoclopramide Injectable 10 milliGRAM(s) IV Push every 6 hours PRN  ondansetron Injectable 4 milliGRAM(s) IV Push every 8 hours PRN  oxyCODONE    IR 10 milliGRAM(s) Oral every 8 hours PRN    Vital Signs Last 24 Hrs  T(C): 36.6 (25 May 2025 09:40), Max: 37.6 (24 May 2025 17:14)  T(F): 97.9 (25 May 2025 09:40), Max: 99.6 (24 May 2025 17:14)  HR: 75 (25 May 2025 09:40) (74 - 105)  BP: 132/77 (25 May 2025 09:40) (132/77 - 153/90)  BP(mean): --  RR: 18 (25 May 2025 09:40) (18 - 18)  SpO2: 93% (25 May 2025 09:40) (92% - 95%)    Parameters below as of 25 May 2025 09:40  Patient On (Oxygen Delivery Method): nasal cannula  O2 Flow (L/min): 2    PHYSICAL EXAM  General: adult in NAD  HEENT: clear oropharynx, no erythema, no ulcers  Neck: supple  CV: normal S1, S2, RRR  Lungs: clear to auscultation, no wheezes, no rales  Abdomen: soft, nontender, nondistended, normal BS  Ext: no edema  Skin: no rash  Neuro: alert and oriented x 3  Central line: normal     LABS:                        9.4    34.87 )-----------( 435      ( 25 May 2025 06:47 )             31.7     Mean Cell Volume : 75.5 fl  Mean Cell Hemoglobin : 22.4 pg  Mean Cell Hemoglobin Concentration : 29.7 g/dL  Auto Neutrophil # : x  Auto Lymphocyte # : x  Auto Monocyte # : x  Auto Eosinophil # : x  Auto Basophil # : x  Auto Neutrophil % : x  Auto Lymphocyte % : x  Auto Monocyte % : x  Auto Eosinophil % : x  Auto Basophil % : x    05-25  134[L]  |  99  |  25[H]  ----------------------------<  150[H]  3.9   |  23  |  0.94    Ca    10.2      25 May 2025 06:46  Phos  3.8     05-25  Mg     2.0     05-25    TPro  5.8[L]  /  Alb  2.8[L]  /  TBili  4.2[H]  /  DBili  x   /  AST  110[H]  /  ALT  124[H]  /  AlkPhos  2011[H]  05-25    Mg 2.0  Phos 3.8  Mg 1.9  Phos 2.5      Uric Acid 2.8    Uric Acid 3.1

## 2025-05-25 NOTE — PROGRESS NOTE ADULT - PROBLEM SELECTOR PLAN 1
- started prednisone 100mg daily for 5 days   - started allopurinol 300mg daily   - f/u CT chest w contrast   - ordered: coags, fibrinogen, LDH, uric acid, G6PD, hepatitis B serologies (including core antibody total) and hepatitis C, HIV  - Trend TLS labs every 12 hours (CMP, Phos, LDH, uric acid)   Give rasburicase 3mg IV for uric acid > 8.0  - Follow up anemia workup, immunoelectrophoresis, EBV, ionized calcium, intact PTH, Bone marrow biopsy   S/P cytoxan and rituximab on 5/23.  Transfuse if Hgb < 7.0. Transfuse if platelets <10K, or <15K if febrile. Transfuse for platelets <50K if bleeding.

## 2025-05-25 NOTE — PROGRESS NOTE ADULT - PROBLEM SELECTOR PLAN 2
Resumed regimen from [facility]. Appreciate palliative care for pain management Resumed regimen from [facility]. Appreciate palliative care for pain management  Continues on oxycodone and IV dilaudid as needed for pain   Plan for discharge when stable on PO regimen

## 2025-05-25 NOTE — PROGRESS NOTE ADULT - NS ATTEND AMEND GEN_ALL_CORE FT
Pt is a 55 yo woman with newy dx'd DLBCL, non-GCB type transferred from [facility] with obstructive jaundice due to lymphoma, abd  pain, diarrhea. She has had severe weight loss, malaise, anorexia, N/V.  She has a h/o T2DM, GERD, asthma, HLD, obesity.    PMHx newly dx'd DLBCL, was admitted at [facility] for jaundice and diarrhea x1wk, green in color; also reported malaise, anorexia, n/v (NBNB emesis), and 70 lb unintentional wt loss in 1-2wk.   CT a/p showed moderate intra/extrahepatic biliary ductal dilation w/ abrupt cutoff in CBD at the hilar with soft tissue measuring up to 4x4cm c/f lymph node mass, enlarged RP and retrocrural LAD, urinary bladder wall thickening, mild/mod R side pleural effusion, HSM w/ lesions, c/f mets.   For obstructive jaundice, GI was consulted there, no ERCP rec at the time.   Pt was transferred to [facility] for inpatient chemo. s/p Cytoxan 1000 g/sq m and rituxan 375 mg/sq m iv on 5/23, today is day 2  LFTs markedly abnl, TB 12.2, [id number], ,     Hypercalcemic  Discussed plan to relieve biliary obstruction with rituximab and cytoxan as pretherapy for standard anthracycline based therapy.  FISH for MYC, BCL2, BCL6 rearrangement pending.  Incr IVFs to 150 cc/hr  allopurinol  rasburicase if urate > 8  TLS labs 2x/d  Daily LFTs - bili improving post Ritux/Cytox  coags 2x/d  I & O  diurese as needed  will use peripheral iv access and aim for OPD mediport placement    OOB Pt is a 55 yo woman with newy dx'd DLBCL, non-GCB type transferred from [facility] with obstructive jaundice due to lymphoma, abd  pain, diarrhea. She has had severe weight loss, malaise, anorexia, N/V.  She has a h/o T2DM, GERD, asthma, HLD, obesity.    PMHx newly dx'd DLBCL, was admitted at [facility] for jaundice and diarrhea x1wk, green in color; also reported malaise, anorexia, n/v (NBNB emesis), and 70 lb unintentional wt loss in 1-2wk.   CT a/p showed moderate intra/extrahepatic biliary ductal dilation w/ abrupt cutoff in CBD at the hilar with soft tissue measuring up to 4x4cm c/f lymph node mass, enlarged RP and retrocrural LAD, urinary bladder wall thickening, mild/mod R side pleural effusion, HSM w/ lesions, c/f mets.   For obstructive jaundice, GI was consulted there, no ERCP rec at the time.   Pt was transferred to [facility] for inpatient chemo. s/p Cytoxan 1000 g/sq m and rituxan 375 mg/sq m iv on 5/23, today is day 3  LFTs markedly abnl, TB 12.2, [id number], ,     Hypercalcemic  Discussed plan to relieve biliary obstruction with rituximab and cytoxan as pretherapy for standard anthracycline based therapy.  FISH for MYC, BCL2, BCL6 rearrangement pending.  Incr IVFs to 150 cc/hr for chemo, can decrease to 100 cc/hr now  allopurinol  rasburicase if urate > 8  TLS labs 2x/d  Daily LFTs - bili improving post Ritux/Cytox  coags 2x/d  I & O  diurese as needed  will use peripheral iv access and aim for OPD mediport placement    OOB

## 2025-05-26 LAB
ADD ON TEST-SPECIMEN IN LAB: SIGNIFICANT CHANGE UP
ALBUMIN SERPL ELPH-MCNC: 2.9 G/DL — LOW (ref 3.3–5)
ALBUMIN SERPL ELPH-MCNC: 3 G/DL — LOW (ref 3.3–5)
ALP SERPL-CCNC: 1560 U/L — HIGH (ref 40–120)
ALP SERPL-CCNC: 1722 U/L — HIGH (ref 40–120)
ALT FLD-CCNC: 103 U/L — HIGH (ref 10–45)
ALT FLD-CCNC: 91 U/L — HIGH (ref 10–45)
ANION GAP SERPL CALC-SCNC: 12 MMOL/L — SIGNIFICANT CHANGE UP (ref 5–17)
ANION GAP SERPL CALC-SCNC: 17 MMOL/L — SIGNIFICANT CHANGE UP (ref 5–17)
APTT BLD: 24.8 SEC — LOW (ref 26.1–36.8)
AST SERPL-CCNC: 43 U/L — HIGH (ref 10–40)
AST SERPL-CCNC: 56 U/L — HIGH (ref 10–40)
BASOPHILS # BLD AUTO: 0.11 K/UL — SIGNIFICANT CHANGE UP (ref 0–0.2)
BASOPHILS NFR BLD AUTO: 0.3 % — SIGNIFICANT CHANGE UP (ref 0–2)
BILIRUB DIRECT SERPL-MCNC: 2.5 MG/DL — HIGH (ref 0–0.3)
BILIRUB SERPL-MCNC: 3.2 MG/DL — HIGH (ref 0.2–1.2)
BILIRUB SERPL-MCNC: 3.7 MG/DL — HIGH (ref 0.2–1.2)
BLD GP AB SCN SERPL QL: NEGATIVE — SIGNIFICANT CHANGE UP
BUN SERPL-MCNC: 32 MG/DL — HIGH (ref 7–23)
BUN SERPL-MCNC: 33 MG/DL — HIGH (ref 7–23)
CALCIUM SERPL-MCNC: 10.6 MG/DL — HIGH (ref 8.4–10.5)
CALCIUM SERPL-MCNC: 10.6 MG/DL — HIGH (ref 8.4–10.5)
CHLORIDE SERPL-SCNC: 94 MMOL/L — LOW (ref 96–108)
CHLORIDE SERPL-SCNC: 96 MMOL/L — SIGNIFICANT CHANGE UP (ref 96–108)
CO2 SERPL-SCNC: 22 MMOL/L — SIGNIFICANT CHANGE UP (ref 22–31)
CO2 SERPL-SCNC: 24 MMOL/L — SIGNIFICANT CHANGE UP (ref 22–31)
CREAT SERPL-MCNC: 0.91 MG/DL — SIGNIFICANT CHANGE UP (ref 0.5–1.3)
CREAT SERPL-MCNC: 1.17 MG/DL — SIGNIFICANT CHANGE UP (ref 0.5–1.3)
EBV DNA SERPL NAA+PROBE-ACNC: SIGNIFICANT CHANGE UP IU/ML
EBVPCR LOG: SIGNIFICANT CHANGE UP LOG10IU/ML
EGFR: 55 ML/MIN/1.73M2 — LOW
EGFR: 55 ML/MIN/1.73M2 — LOW
EGFR: 75 ML/MIN/1.73M2 — SIGNIFICANT CHANGE UP
EGFR: 75 ML/MIN/1.73M2 — SIGNIFICANT CHANGE UP
EOSINOPHIL # BLD AUTO: 0.01 K/UL — SIGNIFICANT CHANGE UP (ref 0–0.5)
EOSINOPHIL NFR BLD AUTO: 0 % — SIGNIFICANT CHANGE UP (ref 0–6)
GLUCOSE BLDC GLUCOMTR-MCNC: 115 MG/DL — HIGH (ref 70–99)
GLUCOSE BLDC GLUCOMTR-MCNC: 129 MG/DL — HIGH (ref 70–99)
GLUCOSE BLDC GLUCOMTR-MCNC: 192 MG/DL — HIGH (ref 70–99)
GLUCOSE BLDC GLUCOMTR-MCNC: 270 MG/DL — HIGH (ref 70–99)
GLUCOSE SERPL-MCNC: 140 MG/DL — HIGH (ref 70–99)
GLUCOSE SERPL-MCNC: 210 MG/DL — HIGH (ref 70–99)
HCT VFR BLD CALC: 30.9 % — LOW (ref 34.5–45)
HEMOGLOBIN INTERPRETATION: SIGNIFICANT CHANGE UP
HGB A MFR BLD: 98 % — SIGNIFICANT CHANGE UP (ref 95.8–98)
HGB A2 MFR BLD: 2 % — SIGNIFICANT CHANGE UP (ref 2–3.2)
HGB BLD-MCNC: 9.2 G/DL — LOW (ref 11.5–15.5)
IGA FLD-MCNC: 161 MG/DL — SIGNIFICANT CHANGE UP (ref 84–499)
IGG FLD-MCNC: 999 MG/DL — SIGNIFICANT CHANGE UP (ref 610–1660)
IMM GRANULOCYTES NFR BLD AUTO: 9.3 % — HIGH (ref 0–0.9)
INR BLD: 0.88 RATIO — SIGNIFICANT CHANGE UP (ref 0.85–1.16)
KAPPA LC SER QL IFE: 2.79 MG/DL — HIGH (ref 0.33–1.94)
KAPPA/LAMBDA FREE LIGHT CHAIN RATIO, SERUM: 1.32 RATIO — SIGNIFICANT CHANGE UP (ref 0.26–1.65)
LAMBDA LC SER QL IFE: 2.12 MG/DL — SIGNIFICANT CHANGE UP (ref 0.57–2.63)
LDH SERPL L TO P-CCNC: 190 U/L — SIGNIFICANT CHANGE UP (ref 50–242)
LDH SERPL L TO P-CCNC: 223 U/L — SIGNIFICANT CHANGE UP (ref 50–242)
LYMPHOCYTES # BLD AUTO: 0.44 K/UL — LOW (ref 1–3.3)
LYMPHOCYTES # BLD AUTO: 1.1 % — LOW (ref 13–44)
MAGNESIUM SERPL-MCNC: 1.8 MG/DL — SIGNIFICANT CHANGE UP (ref 1.6–2.6)
MAGNESIUM SERPL-MCNC: 2 MG/DL — SIGNIFICANT CHANGE UP (ref 1.6–2.6)
MCHC RBC-ENTMCNC: 23.1 PG — LOW (ref 27–34)
MCHC RBC-ENTMCNC: 29.8 G/DL — LOW (ref 32–36)
MCV RBC AUTO: 77.4 FL — LOW (ref 80–100)
MONOCYTES # BLD AUTO: 0.66 K/UL — SIGNIFICANT CHANGE UP (ref 0–0.9)
MONOCYTES NFR BLD AUTO: 1.7 % — LOW (ref 2–14)
NEUTROPHILS # BLD AUTO: 34.15 K/UL — HIGH (ref 1.8–7.4)
NEUTROPHILS NFR BLD AUTO: 87.6 % — HIGH (ref 43–77)
NRBC BLD AUTO-RTO: 0 /100 WBCS — SIGNIFICANT CHANGE UP (ref 0–0)
PHOSPHATE SERPL-MCNC: 4 MG/DL — SIGNIFICANT CHANGE UP (ref 2.5–4.5)
PHOSPHATE SERPL-MCNC: 4.2 MG/DL — SIGNIFICANT CHANGE UP (ref 2.5–4.5)
PLATELET # BLD AUTO: 438 K/UL — HIGH (ref 150–400)
POTASSIUM SERPL-MCNC: 3.5 MMOL/L — SIGNIFICANT CHANGE UP (ref 3.5–5.3)
POTASSIUM SERPL-MCNC: 3.8 MMOL/L — SIGNIFICANT CHANGE UP (ref 3.5–5.3)
POTASSIUM SERPL-SCNC: 3.5 MMOL/L — SIGNIFICANT CHANGE UP (ref 3.5–5.3)
POTASSIUM SERPL-SCNC: 3.8 MMOL/L — SIGNIFICANT CHANGE UP (ref 3.5–5.3)
PROT SERPL-MCNC: 6 G/DL — SIGNIFICANT CHANGE UP (ref 6–8.3)
PROT SERPL-MCNC: 6 G/DL — SIGNIFICANT CHANGE UP (ref 6–8.3)
PROTHROM AB SERPL-ACNC: 10.1 SEC — SIGNIFICANT CHANGE UP (ref 9.9–13.4)
RBC # BLD: 3.99 M/UL — SIGNIFICANT CHANGE UP (ref 3.8–5.2)
RBC # FLD: 25.5 % — HIGH (ref 10.3–14.5)
RH IG SCN BLD-IMP: NEGATIVE — SIGNIFICANT CHANGE UP
SODIUM SERPL-SCNC: 132 MMOL/L — LOW (ref 135–145)
SODIUM SERPL-SCNC: 133 MMOL/L — LOW (ref 135–145)
URATE SERPL-MCNC: 2.8 MG/DL — SIGNIFICANT CHANGE UP (ref 2.5–7)
URATE SERPL-MCNC: 2.9 MG/DL — SIGNIFICANT CHANGE UP (ref 2.5–7)
WBC # BLD: 38.99 K/UL — HIGH (ref 3.8–10.5)
WBC # FLD AUTO: 38.99 K/UL — HIGH (ref 3.8–10.5)

## 2025-05-26 PROCEDURE — 99233 SBSQ HOSP IP/OBS HIGH 50: CPT

## 2025-05-26 RX ORDER — OXYCODONE HYDROCHLORIDE 30 MG/1
30 TABLET ORAL EVERY 12 HOURS
Refills: 0 | Status: DISCONTINUED | OUTPATIENT
Start: 2025-05-26 | End: 2025-05-27

## 2025-05-26 RX ADMIN — INSULIN LISPRO 1: 100 INJECTION, SOLUTION INTRAVENOUS; SUBCUTANEOUS at 18:15

## 2025-05-26 RX ADMIN — Medication 0.5 MILLIGRAM(S): at 14:07

## 2025-05-26 RX ADMIN — Medication 40 MILLIGRAM(S): at 06:02

## 2025-05-26 RX ADMIN — IPRATROPIUM BROMIDE AND ALBUTEROL SULFATE 3 MILLILITER(S): .5; 2.5 SOLUTION RESPIRATORY (INHALATION) at 13:12

## 2025-05-26 RX ADMIN — PREDNISONE 100 MILLIGRAM(S): 20 TABLET ORAL at 14:08

## 2025-05-26 RX ADMIN — HEPARIN SODIUM 5000 UNIT(S): 1000 INJECTION INTRAVENOUS; SUBCUTANEOUS at 14:08

## 2025-05-26 RX ADMIN — FUROSEMIDE 40 MILLIGRAM(S): 10 INJECTION INTRAMUSCULAR; INTRAVENOUS at 06:26

## 2025-05-26 RX ADMIN — FILGRASTIM 480 MICROGRAM(S): 300 INJECTION, SOLUTION INTRAVENOUS; SUBCUTANEOUS at 13:12

## 2025-05-26 RX ADMIN — Medication 0.5 MILLIGRAM(S): at 02:26

## 2025-05-26 RX ADMIN — Medication 0.5 MILLIGRAM(S): at 20:07

## 2025-05-26 RX ADMIN — Medication 2 TABLET(S): at 22:12

## 2025-05-26 RX ADMIN — Medication 0.5 MILLIGRAM(S): at 19:07

## 2025-05-26 RX ADMIN — Medication 0.5 MILLIGRAM(S): at 10:23

## 2025-05-26 RX ADMIN — Medication 0.5 MILLIGRAM(S): at 11:23

## 2025-05-26 RX ADMIN — OXYCODONE HYDROCHLORIDE 30 MILLIGRAM(S): 30 TABLET ORAL at 17:36

## 2025-05-26 RX ADMIN — SERTRALINE 50 MILLIGRAM(S): 100 TABLET, FILM COATED ORAL at 13:12

## 2025-05-26 RX ADMIN — Medication 0.5 MILLIGRAM(S): at 06:02

## 2025-05-26 RX ADMIN — OXYCODONE HYDROCHLORIDE 10 MILLIGRAM(S): 30 TABLET ORAL at 00:57

## 2025-05-26 RX ADMIN — IPRATROPIUM BROMIDE AND ALBUTEROL SULFATE 3 MILLILITER(S): .5; 2.5 SOLUTION RESPIRATORY (INHALATION) at 06:02

## 2025-05-26 RX ADMIN — IPRATROPIUM BROMIDE AND ALBUTEROL SULFATE 3 MILLILITER(S): .5; 2.5 SOLUTION RESPIRATORY (INHALATION) at 17:36

## 2025-05-26 RX ADMIN — Medication 0.5 MILLIGRAM(S): at 22:13

## 2025-05-26 RX ADMIN — OXYCODONE HYDROCHLORIDE 10 MILLIGRAM(S): 30 TABLET ORAL at 06:52

## 2025-05-26 RX ADMIN — HEPARIN SODIUM 5000 UNIT(S): 1000 INJECTION INTRAVENOUS; SUBCUTANEOUS at 06:02

## 2025-05-26 RX ADMIN — OXYCODONE HYDROCHLORIDE 10 MILLIGRAM(S): 30 TABLET ORAL at 09:15

## 2025-05-26 RX ADMIN — Medication 0.5 MILLIGRAM(S): at 15:07

## 2025-05-26 RX ADMIN — HEPARIN SODIUM 5000 UNIT(S): 1000 INJECTION INTRAVENOUS; SUBCUTANEOUS at 22:12

## 2025-05-26 RX ADMIN — INSULIN GLARGINE-YFGN 10 UNIT(S): 100 INJECTION, SOLUTION SUBCUTANEOUS at 22:13

## 2025-05-26 RX ADMIN — INSULIN LISPRO 1: 100 INJECTION, SOLUTION INTRAVENOUS; SUBCUTANEOUS at 22:14

## 2025-05-26 RX ADMIN — AMLODIPINE BESYLATE 10 MILLIGRAM(S): 10 TABLET ORAL at 06:02

## 2025-05-26 RX ADMIN — Medication 0.5 MILLIGRAM(S): at 23:13

## 2025-05-26 RX ADMIN — IPRATROPIUM BROMIDE AND ALBUTEROL SULFATE 3 MILLILITER(S): .5; 2.5 SOLUTION RESPIRATORY (INHALATION) at 00:57

## 2025-05-26 RX ADMIN — Medication 300 MILLIGRAM(S): at 13:12

## 2025-05-26 RX ADMIN — OXYCODONE HYDROCHLORIDE 10 MILLIGRAM(S): 30 TABLET ORAL at 10:15

## 2025-05-26 RX ADMIN — Medication 0.5 MILLIGRAM(S): at 06:51

## 2025-05-26 NOTE — PROGRESS NOTE ADULT - NS ATTEST RISK PROBLEM GEN_ALL_CORE FT
1. Number and complexity of problems addressed for this patient:    1.1 Moderate (At least 1)  [ ] 1 or more chronic illnesses with exacerbation, progression, or side effects of treatment  [ ] 2 or more stable chronic illnesses  [ ] 1 undiagnosed new problem with uncertain prognosis  [ ] 1 acute illness with systemic symptoms  [ ] 1 acute complicated injury  1.2 High (At least 1)   [ ] 1 or more chronic illnesses with severe exacerbation, progression, or side effects of treatment  [ x] 1 acute or chronic illnesses or injuries that may pose a threat to life or bodily function    2. Amount and/or Complexity of Data that was Reviewed and Analyzed for this case:       Moderate (1 out of 3)       High (2 out of 3)  2.1. (Any combination of 3 of the following)   [x ] Prior External notes were reviewed  [ ] Each test result was reviewed (see "LABS" and "RADIOLOGY & ADDITIONAL STUDIES" above)  [ ] The following tests were ordered and/or reviewed (Only count 1 point for ordering or reviewing a unique test):  	[ ]CBC  	[ ] Chemistry   	[ ] Imaging   	[ ] Other:   [ ] Assessment requiring an independent historian   		Name of historian and relationship:   2.2  [ ] Personally review and interpretation of  image or testing   2.3  [x ] Discussion of management or test interpretation with external physician/other qualified health care professional\appropriate source (not separately reported)    3. Risk of Complications and/or Morbidity or Mortality of for this Patient’s Management:  3.1 Moderate risk of morbidity from additional diagnostic testing or treatment (At least 1):   [ ] Prescription drug management   [ ] Decision regarding minor surgery, treatment, or procedure with identified patient or procedure risk factors  [ ] Decision regarding elective major surgery, treatment, or procedure without identified patient or procedure risk factors   [ ] Diagnosis or treatment significantly limited by social determinants of health   [ ] Other:   3.2 High risk of morbidity from additional diagnostic testing or treatment (At least 1):   [ ] Drug therapy requiring intensive monitoring for toxicity   [ ] Decision regarding elective major surgery, treatment, or procedure with identified patient or procedure risk factors   [ ] Decision regarding emergency major surgery, treatment, or procedure   [ ] Decision regarding hospitalization or escalation of hospital-level of care  [ ] Decision not to resuscitate, not to intubate, or to de-escalate care because of poor prognosis   [ ] Decision to proceed or not with artificial nutrition   [ x] Parenteral controlled substance  [ ] Other:
1. Number and complexity of problems addressed for this patient:    1.1 Moderate (At least 1)  [ ] 1 or more chronic illnesses with exacerbation, progression, or side effects of treatment  [ ] 2 or more stable chronic illnesses  [ ] 1 undiagnosed new problem with uncertain prognosis  [ ] 1 acute illness with systemic symptoms  [ ] 1 acute complicated injury  1.2 High (At least 1)   [ ] 1 or more chronic illnesses with severe exacerbation, progression, or side effects of treatment  [ x] 1 acute or chronic illnesses or injuries that may pose a threat to life or bodily function    2. Amount and/or Complexity of Data that was Reviewed and Analyzed for this case:       Moderate (1 out of 3)       High (2 out of 3)  2.1. (Any combination of 3 of the following)   [x ] Prior External notes were reviewed  [ ] Each test result was reviewed (see "LABS" and "RADIOLOGY & ADDITIONAL STUDIES" above)  [ ] The following tests were ordered and/or reviewed (Only count 1 point for ordering or reviewing a unique test):  	[ ]CBC  	[ ] Chemistry   	[ ] Imaging   	[ ] Other:   [ ] Assessment requiring an independent historian   		Name of historian and relationship:   2.2  [ ] Personally review and interpretation of  image or testing   2.3  [x ] Discussion of management or test interpretation with external physician/other qualified health care professional\appropriate source (not separately reported)    3. Risk of Complications and/or Morbidity or Mortality of for this Patient’s Management:  3.1 Moderate risk of morbidity from additional diagnostic testing or treatment (At least 1):   [ ] Prescription drug management   [ ] Decision regarding minor surgery, treatment, or procedure with identified patient or procedure risk factors  [ ] Decision regarding elective major surgery, treatment, or procedure without identified patient or procedure risk factors   [ ] Diagnosis or treatment significantly limited by social determinants of health   [ ] Other:   3.2 High risk of morbidity from additional diagnostic testing or treatment (At least 1):   [ ] Drug therapy requiring intensive monitoring for toxicity   [ ] Decision regarding elective major surgery, treatment, or procedure with identified patient or procedure risk factors   [ ] Decision regarding emergency major surgery, treatment, or procedure   [ ] Decision regarding hospitalization or escalation of hospital-level of care  [ ] Decision not to resuscitate, not to intubate, or to de-escalate care because of poor prognosis   [ ] Decision to proceed or not with artificial nutrition   [ x] Parenteral controlled substance  [ ] Other:

## 2025-05-26 NOTE — PROGRESS NOTE ADULT - NS ATTEND AMEND GEN_ALL_CORE FT
Pt is a 55 yo woman with newy dx'd DLBCL, non-GCB type transferred from [facility] with obstructive jaundice due to lymphoma, abd  pain, diarrhea. She has had severe weight loss, malaise, anorexia, N/V.  She has a h/o T2DM, GERD, asthma, HLD, obesity.    PMHx newly dx'd DLBCL, was admitted at [facility] for jaundice and diarrhea x1wk, green in color; also reported malaise, anorexia, n/v (NBNB emesis), and 70 lb unintentional wt loss in 1-2wk.   CT a/p showed moderate intra/extrahepatic biliary ductal dilation w/ abrupt cutoff in CBD at the hilar with soft tissue measuring up to 4x4cm c/f lymph node mass, enlarged RP and retrocrural LAD, urinary bladder wall thickening, mild/mod R side pleural effusion, HSM w/ lesions, c/f mets.   For obstructive jaundice, GI was consulted there, no ERCP rec at the time.   Pt was transferred to [facility] for inpatient chemo. s/p Cytoxan 1000 g/sq m and rituxan 375 mg/sq m iv on 5/23, today is day 3  LFTs markedly abnl, TB 12.2, [id number], ,     Hypercalcemic  Discussed plan to relieve biliary obstruction with rituximab and cytoxan as pretherapy for standard anthracycline based therapy.  FISH for MYC, BCL2, BCL6 rearrangement pending.  Incr IVFs to 150 cc/hr for chemo, can decrease to 100 cc/hr now  allopurinol  rasburicase if urate > 8  TLS labs 2x/d  Daily LFTs - bili improving post Ritux/Cytox  coags 2x/d  I & O  diurese as needed  will use peripheral iv access and aim for OPD mediport placement    OOB Pt is a 55 yo woman with newy dx'd DLBCL, non-GCB type transferred from [facility] with obstructive jaundice due to lymphoma, abd  pain, diarrhea. She has had severe weight loss, malaise, anorexia, N/V.  She has a h/o T2DM, GERD, asthma, HLD, obesity.    PMHx newly dx'd DLBCL, was admitted at [facility] for jaundice and diarrhea x1wk, green in color; also reported malaise, anorexia, n/v (NBNB emesis), and 70 lb unintentional wt loss in 1-2wk.   CT a/p showed moderate intra/extrahepatic biliary ductal dilation w/ abrupt cutoff in CBD at the hilar with soft tissue measuring up to 4x4cm c/f lymph node mass, enlarged RP and retrocrural LAD, urinary bladder wall thickening, mild/mod R side pleural effusion, HSM w/ lesions, c/f mets.   For obstructive jaundice, GI was consulted there, no ERCP rec at the time.   Pt was transferred to [facility] for inpatient chemo. s/p Cytoxan 1000 g/sq m and rituxan 375 mg/sq m iv on 5/23, today is day 4  LFTs markedly abnl, TB 12.2, [id number], ,     Hypercalcemic  Discussed plan to relieve biliary obstruction with rituximab and cytoxan as pretherapy for standard anthracycline based therapy. Bilirubin improving - today is 3.2  FISH for MYC, BCL2, BCL6 rearrangement pending.  Incr IVFs to 150 cc/hr for chemo, can decrease to 100 cc/hr now  allopurinol  rasburicase if urate > 8  TLS labs 2x/d  Daily LFTs - bili improving post Ritux/Cytox  coags 2x/d  I & O  diurese as needed  will use peripheral iv access and aim for OPD mediport placement    OOB

## 2025-05-26 NOTE — PROGRESS NOTE ADULT - PROBLEM SELECTOR PLAN 3
Patient is supported by her spouse, César, and 3 sons (Rigoberto, César Jr, and Mayo).   Chaplaincy following     In the event of newly developing, evolving, or worsening symptoms, please contact the Palliative Medicine team via pager (if the patient is at [facility] #[phone] or if the patient is at [facility] #[phone]) The Geriatric and Palliative Medicine service has coverage 24 hours a day/ 7 days a week to provide medical recommendations regarding symptom management needs via telephone.

## 2025-05-26 NOTE — PROGRESS NOTE ADULT - PROBLEM SELECTOR PLAN 2
In setting of diffuse disease burden noted on CT scan  - added Oxycontin 30mg BID (5/26)    - oxycodone 10 mg q8 hours prn mod pain (pt takes this at home for chronic back pain)- Can likely discontinue tomorrow  - IV dilaudid 0.5 mg q3 hours prn severe pain- plan to convert to PO dilaudid 2-4mg q4h prn mod-severe pain as long acting pain medications reach steady state   - IV dilaudid 0.2 mg q3 hours prn breakthrough pain  - bowel regimen while on opioids   - narcan prn  - Patient would benefit from establishing care   Dr. Ordonez, Dr. Lashonda Gonzalez, Mercedez Hernández NP, Soo Weinberg NP    [facility] Advanced Medicine, [facility]  Phone: [phone] [address] [id number]

## 2025-05-26 NOTE — PROGRESS NOTE ADULT - PROBLEM SELECTOR PLAN 2
Resumed regimen from [facility]. Appreciate palliative care for pain management  Continues on oxycodone and IV dilaudid as needed for pain   Plan for discharge when stable on PO regimen

## 2025-05-26 NOTE — PROGRESS NOTE ADULT - SUBJECTIVE AND OBJECTIVE BOX
Diagnosis:    Protocol/Chemo Regimen:    Day:     Pt endorsed:    Review of Systems:     Pain scale:     Diet:     Allergies    azithromycin (Unknown)  Januvia (Rash)  Pepcid (Hives; Rash)    Intolerances        ANTIMICROBIALS      HEME/ONC MEDICATIONS  heparin   Injectable 5000 Unit(s) SubCutaneous every 8 hours      STANDING MEDICATIONS  albuterol/ipratropium for Nebulization 3 milliLiter(s) Nebulizer every 6 hours  allopurinol 300 milliGRAM(s) Oral daily  amLODIPine   Tablet 10 milliGRAM(s) Oral daily  dextrose 5%. 1000 milliLiter(s) IV Continuous <Continuous>  dextrose 5%. 1000 milliLiter(s) IV Continuous <Continuous>  dextrose 50% Injectable 25 Gram(s) IV Push once  dextrose 50% Injectable 12.5 Gram(s) IV Push once  dextrose 50% Injectable 25 Gram(s) IV Push once  filgrastim-sndz (ZARXIO) Injectable 480 MICROGram(s) SubCutaneous daily  glucagon  Injectable 1 milliGRAM(s) IntraMuscular once  insulin glargine Injectable (LANTUS) 10 Unit(s) SubCutaneous at bedtime  insulin lispro (ADMELOG) corrective regimen sliding scale   SubCutaneous three times a day before meals  insulin lispro (ADMELOG) corrective regimen sliding scale   SubCutaneous at bedtime  pantoprazole    Tablet 40 milliGRAM(s) Oral before breakfast  polyethylene glycol 3350 17 Gram(s) Oral daily  predniSONE   Tablet 100 milliGRAM(s) Oral every 24 hours  senna 2 Tablet(s) Oral at bedtime  sertraline 50 milliGRAM(s) Oral daily  sodium chloride 0.9%. 1000 milliLiter(s) IV Continuous <Continuous>      PRN MEDICATIONS  acetaminophen     Tablet .. 650 milliGRAM(s) Oral every 6 hours PRN  aluminum hydroxide/magnesium hydroxide/simethicone Suspension 30 milliLiter(s) Oral every 4 hours PRN  dextrose Oral Gel 15 Gram(s) Oral once PRN  HYDROmorphone  Injectable 0.5 milliGRAM(s) IV Push every 3 hours PRN  HYDROmorphone  Injectable 0.2 milliGRAM(s) IV Push every 3 hours PRN  melatonin 3 milliGRAM(s) Oral at bedtime PRN  metoclopramide Injectable 10 milliGRAM(s) IV Push every 6 hours PRN  ondansetron Injectable 8 milliGRAM(s) IV Push every 8 hours PRN  oxyCODONE    IR 10 milliGRAM(s) Oral every 8 hours PRN        Vital Signs Last 24 Hrs  T(C): 36.5 (26 May 2025 05:34), Max: 37.1 (25 May 2025 14:30)  T(F): 97.7 (26 May 2025 05:34), Max: 98.7 (25 May 2025 14:30)  HR: 91 (26 May 2025 05:34) (75 - 104)  BP: 155/90 (26 May 2025 05:34) (132/77 - 155/90)  BP(mean): --  RR: 18 (26 May 2025 05:34) (18 - 18)  SpO2: 96% (26 May 2025 05:34) (93% - 97%)    Parameters below as of 26 May 2025 05:34  Patient On (Oxygen Delivery Method): nasal cannula  O2 Flow (L/min): 2      PHYSICAL EXAM  General: NAD  HEENT: PERRLA, EOMOI, clear oropharynx, anicteric sclera, pink conjunctiva  Neck: supple  CV: (+) S1/S2 RRR  Lungs: clear to auscultation, no wheezes or rales  Abdomen: soft, non-tender, non-distended (+) BS  Ext: no clubbing, cyanosis or edema  Skin: no rashes and no petechiae  Neuro: alert and oriented X 3, no focal deficits  Central Line:     RECENT CULTURES:  05-22 @ 00:45  Clean Catch  --  --  --    <10,000 CFU/mL Normal Urogenital Cintia  --        LABS:                        9.2    38.99 )-----------( 438      ( 26 May 2025 06:53 )             30.9         Mean Cell Volume : 77.4 fl  Mean Cell Hemoglobin : 23.1 pg  Mean Cell Hemoglobin Concentration : 29.8 g/dL  Auto Neutrophil # : x  Auto Lymphocyte # : x  Auto Monocyte # : x  Auto Eosinophil # : x  Auto Basophil # : x  Auto Neutrophil % : x  Auto Lymphocyte % : x  Auto Monocyte % : x  Auto Eosinophil % : x  Auto Basophil % : x      05-26    132[L]  |  96  |  32[H]  ----------------------------<  140[H]  3.5   |  24  |  0.91    Ca    10.6[H]      26 May 2025 06:55  Phos  4.2     05-26  Mg     2.0     05-26    TPro  6.0  /  Alb  2.9[L]  /  TBili  3.2[H]  /  DBili  x   /  AST  43[H]  /  ALT  91[H]  /  AlkPhos  1560[H]  05-26      Mg 2.0  Phos 4.2  Mg 1.8  Phos 4.0      PT/INR - ( 26 May 2025 06:53 )   PT: 10.1 sec;   INR: 0.88 ratio         PTT - ( 26 May 2025 06:53 )  PTT:24.8 sec      Uric Acid 2.9      Uric Acid 2.8        RADIOLOGY & ADDITIONAL STUDIES:         Diagnosis: DLBCL non germinal center     Protocol/Chemo Regimen: Cytoxan and Rituximab     Day: 4    Subjective:   +abdominal distention improved.    Review of Systems: Denies nausea, vomiting, diarrhea, chest pain, SOB     Pain scale:  abdominal pain    Allergies  azithromycin (Unknown)  Januvia (Rash)  Pepcid (Hives; Rash)    HEME/ONC MEDICATIONS  heparin   Injectable 5000 Unit(s) SubCutaneous every 8 hours    STANDING MEDICATIONS  albuterol/ipratropium for Nebulization 3 milliLiter(s) Nebulizer every 6 hours  allopurinol 300 milliGRAM(s) Oral daily  amLODIPine   Tablet 10 milliGRAM(s) Oral daily  dextrose 5%. 1000 milliLiter(s) IV Continuous <Continuous>  dextrose 5%. 1000 milliLiter(s) IV Continuous <Continuous>  dextrose 50% Injectable 25 Gram(s) IV Push once  dextrose 50% Injectable 12.5 Gram(s) IV Push once  dextrose 50% Injectable 25 Gram(s) IV Push once  filgrastim-sndz (ZARXIO) Injectable 480 MICROGram(s) SubCutaneous daily  glucagon  Injectable 1 milliGRAM(s) IntraMuscular once  insulin glargine Injectable (LANTUS) 10 Unit(s) SubCutaneous at bedtime  insulin lispro (ADMELOG) corrective regimen sliding scale   SubCutaneous three times a day before meals  insulin lispro (ADMELOG) corrective regimen sliding scale   SubCutaneous at bedtime  pantoprazole    Tablet 40 milliGRAM(s) Oral before breakfast  polyethylene glycol 3350 17 Gram(s) Oral daily  predniSONE   Tablet 100 milliGRAM(s) Oral every 24 hours  senna 2 Tablet(s) Oral at bedtime  sertraline 50 milliGRAM(s) Oral daily  sodium chloride 0.9%. 1000 milliLiter(s) IV Continuous <Continuous>    PRN MEDICATIONS  acetaminophen     Tablet .. 650 milliGRAM(s) Oral every 6 hours PRN  aluminum hydroxide/magnesium hydroxide/simethicone Suspension 30 milliLiter(s) Oral every 4 hours PRN  dextrose Oral Gel 15 Gram(s) Oral once PRN  HYDROmorphone  Injectable 0.5 milliGRAM(s) IV Push every 3 hours PRN  HYDROmorphone  Injectable 0.2 milliGRAM(s) IV Push every 3 hours PRN  melatonin 3 milliGRAM(s) Oral at bedtime PRN  metoclopramide Injectable 10 milliGRAM(s) IV Push every 6 hours PRN  ondansetron Injectable 8 milliGRAM(s) IV Push every 8 hours PRN  oxyCODONE    IR 10 milliGRAM(s) Oral every 8 hours PRN    Vital Signs Last 24 Hrs  T(C): 36.5 (26 May 2025 05:34), Max: 37.1 (25 May 2025 14:30)  T(F): 97.7 (26 May 2025 05:34), Max: 98.7 (25 May 2025 14:30)  HR: 91 (26 May 2025 05:34) (75 - 104)  BP: 155/90 (26 May 2025 05:34) (132/77 - 155/90)  BP(mean): --  RR: 18 (26 May 2025 05:34) (18 - 18)  SpO2: 96% (26 May 2025 05:34) (93% - 97%)    Parameters below as of 26 May 2025 05:34  Patient On (Oxygen Delivery Method): nasal cannula  O2 Flow (L/min): 2    PHYSICAL EXAM  General: adult in NAD  HEENT: clear oropharynx, no erythema, no ulcers  CV: normal S1, S2, RRR  Lungs: clear to auscultation, no wheezes, no rales  Abdomen: soft, nontender, nondistended, normal BS  Ext: no edema  Skin: no rash  Neuro: alert and oriented x 3  Central line: normal     RECENT CULTURES:  05-22 @ 00:45  Clean Catch  <10,000 CFU/mL Normal Urogenital Cintia    LABS:                        9.2    38.99 )-----------( 438      ( 26 May 2025 06:53 )             30.9     Mean Cell Volume : 77.4 fl  Mean Cell Hemoglobin : 23.1 pg  Mean Cell Hemoglobin Concentration : 29.8 g/dL  Auto Neutrophil # : x  Auto Lymphocyte # : x  Auto Monocyte # : x  Auto Eosinophil # : x  Auto Basophil # : x  Auto Neutrophil % : x  Auto Lymphocyte % : x  Auto Monocyte % : x  Auto Eosinophil % : x  Auto Basophil % : x      05-26    132[L]  |  96  |  32[H]  ----------------------------<  140[H]  3.5   |  24  |  0.91    Ca    10.6[H]      26 May 2025 06:55  Phos  4.2     05-26  Mg     2.0     05-26    TPro  6.0  /  Alb  2.9[L]  /  TBili  3.2[H]  /  DBili  x   /  AST  43[H]  /  ALT  91[H]  /  AlkPhos  1560[H]  05-26    Mg 2.0  Phos 4.2  Mg 1.8  Phos 4.0    PT/INR - ( 26 May 2025 06:53 )   PT: 10.1 sec;   INR: 0.88 ratio    PTT - ( 26 May 2025 06:53 )  PTT:24.8 sec      Uric Acid 2.9      Uric Acid 2.8    RADIOLOGY & ADDITIONAL STUDIES:  CT Chest No Cont (05.23.25 @ 14:47) >  Lymphadenopathy involving the cervical, mediastinal, hilar and   retrocrural lymph nodes, consistent with known lymphoma.  Small right pleural effusion.

## 2025-05-26 NOTE — PROGRESS NOTE ADULT - PROBLEM SELECTOR PLAN 3
-GI discussed with Dr. Justice at [facility], ERCP was held, pt was resumed on diet.   Grade 3 transaminitis. Trend liver enzymes daily

## 2025-05-26 NOTE — PROGRESS NOTE ADULT - SUBJECTIVE AND OBJECTIVE BOX
[facility] Geriatrics and Palliative Care  Paulina Valle, Palliative Care Attending  Date of [id number] @ 12:26    SUBJECTIVE AND OBJECTIVE: Patient seen this AM with her spouse at bedside. Patient reports right sided flank pain and reports that "if she takes the oxycodone 10mg and then the IV dilaudid 1 hour after, her pain is better controlled". We discussed initiating oxy ER with the goal of minimizing her need for IV dilaudid and she was amenable.     Indication for Geriatrics and Palliative Care Services/INTERVAL HPI: sx management in setting of new malignancy     OVERNIGHT EVENTS:  > 5/24: Over the past 24 hours, patient required 0.5mg IV dilaudid x5, IV zofran x1, melatonin x1.     DNR on chart:   Allergies    azithromycin (Unknown)  Januvia (Rash)  Pepcid (Hives; Rash)    Intolerances    MEDICATIONS  (STANDING):  albuterol/ipratropium for Nebulization 3 milliLiter(s) Nebulizer every 6 hours  allopurinol 300 milliGRAM(s) Oral daily  amLODIPine   Tablet 10 milliGRAM(s) Oral daily  dextrose 5%. 1000 milliLiter(s) (100 mL/Hr) IV Continuous <Continuous>  dextrose 5%. 1000 milliLiter(s) (50 mL/Hr) IV Continuous <Continuous>  dextrose 50% Injectable 25 Gram(s) IV Push once  dextrose 50% Injectable 12.5 Gram(s) IV Push once  dextrose 50% Injectable 25 Gram(s) IV Push once  filgrastim-sndz (ZARXIO) Injectable 480 MICROGram(s) SubCutaneous daily  glucagon  Injectable 1 milliGRAM(s) IntraMuscular once  heparin   Injectable 5000 Unit(s) SubCutaneous every 8 hours  insulin glargine Injectable (LANTUS) 10 Unit(s) SubCutaneous at bedtime  insulin lispro (ADMELOG) corrective regimen sliding scale   SubCutaneous three times a day before meals  insulin lispro (ADMELOG) corrective regimen sliding scale   SubCutaneous at bedtime  oxyCODONE  ER Tablet 30 milliGRAM(s) Oral every 12 hours  pantoprazole    Tablet 40 milliGRAM(s) Oral before breakfast  polyethylene glycol 3350 17 Gram(s) Oral daily  predniSONE   Tablet 100 milliGRAM(s) Oral every 24 hours  senna 2 Tablet(s) Oral at bedtime  sertraline 50 milliGRAM(s) Oral daily  sodium chloride 0.9%. 1000 milliLiter(s) (40 mL/Hr) IV Continuous <Continuous>    MEDICATIONS  (PRN):  acetaminophen     Tablet .. 650 milliGRAM(s) Oral every 6 hours PRN Temp greater or equal to 38C (100.4F), Mild Pain (1 - 3)  aluminum hydroxide/magnesium hydroxide/simethicone Suspension 30 milliLiter(s) Oral every 4 hours PRN Dyspepsia  dextrose Oral Gel 15 Gram(s) Oral once PRN Blood Glucose LESS THAN 70 milliGRAM(s)/deciliter  HYDROmorphone  Injectable 0.5 milliGRAM(s) IV Push every 3 hours PRN Severe Pain (7 - 10)  HYDROmorphone  Injectable 0.2 milliGRAM(s) IV Push every 3 hours PRN breakthrough pain  melatonin 3 milliGRAM(s) Oral at bedtime PRN Insomnia  metoclopramide Injectable 10 milliGRAM(s) IV Push every 6 hours PRN nausea/vomitting  ondansetron Injectable 8 milliGRAM(s) IV Push every 8 hours PRN Nausea and/or Vomiting  oxyCODONE    IR 10 milliGRAM(s) Oral every 8 hours PRN Moderate Pain (4 - 6)      ITEMS UNCHECKED ARE NOT PRESENT    PRESENT SYMPTOMS: [ ]Unable to self-report - see [ ] CPOT [ ] PAINADS [ ] RDOS  Source if other than patient:  [ ]Family   [ ]Team     Pain: [ x]yes [ ]no  QOL impact - unable to be comfortable throughout the day  Location -  abdomen, R. sided flank               Aggravating factors - none  Quality - excruciating  Radiation - down R leg  Timing- constant   Severity (0-10 scale): 10  Minimal acceptable level (0-10 scale): 0    CPOT:    https://www.sccm.org/getattachment/twg43e63-9n5r-8f8x-4v7b-9146b4119r2j/Critical-Care-Pain-Observation-Tool-(CPOT)    Dyspnea:                           [ ]Mild [ ]Moderate [ ]Severe  Anxiety:                             [ ]Mild [ ]Moderate [ ]Severe  Fatigue:                             [ ]Mild [ ]Moderate [ ]Severe  Nausea:                             [ ]Mild [ ]Moderate [ ]Severe  Loss of appetite:              [ ]Mild [ ]Moderate [ ]Severe  Constipation:                    [ ]Mild [ ]Moderate [ ]Severe  Other Symptoms:  [x ]All other review of systems negative     PCSSQ[Palliative Care Spiritual Screening Question]   Severity (0-10):  Score of 4 or > indicate consideration of Chaplaincy referral.  Chaplaincy Referral: [ ] yes [ ] refused [ ] following [x ] Deferred     Caregiver [place]? : [ ] yes [ x] no [ ] Deferred [ ] Declined             Social work referral [ ] Patient & Family Centered Care Referral [ ]     Anticipatory Grief present?:  [ ] yes [ x] no  [ ] Deferred                  Social work referral [ ] Chaplaincy Referral[ ]    PHYSICAL EXAM:  Vital Signs Last 24 Hrs  T(C): 37.1 (26 May 2025 09:00), Max: 37.1 (25 May 2025 14:30)  T(F): 98.7 (26 May 2025 09:00), Max: 98.7 (25 May 2025 14:30)  HR: 98 (26 May 2025 09:00) (91 - 104)  BP: 129/80 (26 May 2025 09:00) (129/80 - 155/90)  BP(mean): --  RR: 18 (26 May 2025 09:00) (18 - 18)  SpO2: 94% (26 May 2025 09:00) (94% - 97%)    Parameters below as of 26 May 2025 09:00  Patient On (Oxygen Delivery Method): nasal cannula  O2 Flow (L/min): 2   I&O's Summary    25 May 2025 07:01  -  26 May 2025 07:00  --------------------------------------------------------  IN: 1497 mL / OUT: 2950 mL / NET: -1453 mL    26 May 2025 07:01  -  26 May 2025 12:26  --------------------------------------------------------  IN: 240 mL / OUT: 500 mL / NET: -260 mL       GENERAL: [ ]Cachexia    [x ]Alert  [x ]Oriented x 3  [ ]Lethargic  [ ]Unarousable  [x ]Verbal  [ ]Non-Verbal  Behavioral:   [ ] Anxiety  [ ] Delirium [ ] Agitation [ ] Other  HEENT:  [ ]Normal   [ ]Dry mouth   [ ]ET Tube/Trach  [ ]Oral lesions [x] sclera icteric  PULMONARY:   [x ]Clear [ ]Tachypnea  [ ]Audible excessive secretions   [ ]Rhonchi        [ ]Right [ ]Left [ ]Bilateral  [ ]Crackles        [ ]Right [ ]Left [ ]Bilateral  [ ]Wheezing     [ ]Right [ ]Left [ ]Bilateral  [ ]Diminished breath sounds [ ]right [ ]left [ ]bilateral  CARDIOVASCULAR:    [ x]Regular [ ]Irregular [ ]Tachy  [ ]Dionte [ ]Murmur [ ]Other  GASTROINTESTINAL:  [x ]Soft  [ ]Distended   [ ]+BS  [ ]Non tender [x ]Tender  [ ]Other [ ]PEG [ ]OGT/ NGT  Last BM: 5/18?  GENITOURINARY:  [ x]Normal [ ] Incontinent   [ ]Oliguria/Anuria   [ ][place]  MUSCULOSKELETAL:   [ x]Normal   [ ]Weakness  [ ]Bed/Wheelchair bound [ ]Edema  NEUROLOGIC:   [ x]No focal deficits  [ ]Cognitive impairment  [ ]Dysphagia [ ]Dysarthria [ ]Paresis [ ]Other   SKIN:   [ ]Normal  [ ]Rash  [x ]Other diffusely jaundiced  [ ]Pressure ulcer(s)       Present on admission [ ]y [ ]n    CRITICAL CARE:  [ ]Shock Present  [ ]Septic [ ]Cardiogenic [ ]Neurologic [ ]Hypovolemic  [ ]Vasopressors [ ]Inotropes  [ ]Respiratory failure present [ ]Mechanical Ventilation [ ]Non-invasive ventilatory support [ ]High-Flow   [ ]Acute  [ ]Chronic [ ]Hypoxic  [ ]Hypercarbic [ ]Other  [ ]Other organ failure     LABS:                        9.2    38.99 )-----------( 438      ( 26 May 2025 06:53 )             30.9   05-26    132[L]  |  96  |  32[H]  ----------------------------<  140[H]  3.5   |  24  |  0.91    Ca    10.6[H]      26 May 2025 06:55  Phos  4.2     05-26  Mg     2.0     05-26    TPro  6.0  /  Alb  2.9[L]  /  TBili  3.2[H]  /  DBili  2.5[H]  /  AST  43[H]  /  ALT  91[H]  /  AlkPhos  1560[H]  05-26  PT/INR - ( 26 May 2025 06:53 )   PT: 10.1 sec;   INR: 0.88 ratio         PTT - ( 26 May 2025 06:53 )  PTT:24.8 sec    Urinalysis Basic - ( 26 May 2025 06:55 )    Color: x / Appearance: x / SG: x / pH: x  Gluc: 140 mg/dL / Ketone: x  / Bili: x / Urobili: x   Blood: x / Protein: x / Nitrite: x   Leuk Esterase: x / RBC: x / WBC x   Sq Epi: x / Non Sq Epi: x / Bacteria: x      RADIOLOGY & ADDITIONAL STUDIES: < from: CT Chest No Cont (05.23.25 @ 14:47) >    IMPRESSION:  Lymphadenopathy involving the cervical, mediastinal, hilar and   retrocrural lymph nodes, consistent with known lymphoma.    Small right pleural effusion.    < end of copied text >      Protein Calorie Malnutrition Present: [ ]mild [ ]moderate [ ]severe [ ]underweight [ ]morbid obesity  https://www.andeal.org/vault/2440/web/files/ONC/Table_Clinical%20Characteristics%20to%20Document%20Malnutrition-White%20JV%20et%20al%202012.pdf    Height (cm): 166 (05-22-25 @ 19:55), 167.6 (05-21-25 @ 20:37), 167.6 (05-06-25 @ 07:55)  Weight (kg): 121.6 (05-22-25 @ 19:55), 120 (05-21-25 @ 20:37), 123.4 (05-06-25 @ 07:55)  BMI (kg/m2): 44.1 (05-22-25 @ 19:55), 42.7 (05-21-25 @ 20:37), 43.9 (05-06-25 @ 07:55)    [ ]PPSV2 < or = 30%  [ ]significant weight loss [ ]poor nutritional intake [ ]anasarca [ ]Artificial Nutrition    Other REFERRALS:  [ ]Hospice  [ ]Child Life  [ ]Social Work  [ ]Case management [ ]Holistic Therapy

## 2025-05-27 ENCOUNTER — RESULT REVIEW (OUTPATIENT)
Age: 55
End: 2025-05-27

## 2025-05-27 ENCOUNTER — TRANSCRIPTION ENCOUNTER (OUTPATIENT)
Age: 55
End: 2025-05-27

## 2025-05-27 VITALS
SYSTOLIC BLOOD PRESSURE: 143 MMHG | RESPIRATION RATE: 17 BRPM | TEMPERATURE: 98 F | OXYGEN SATURATION: 94 % | HEART RATE: 96 BPM | DIASTOLIC BLOOD PRESSURE: 79 MMHG

## 2025-05-27 LAB
ALBUMIN SERPL ELPH-MCNC: 3.4 G/DL — SIGNIFICANT CHANGE UP (ref 3.3–5)
ALBUMIN SERPL ELPH-MCNC: 3.6 G/DL — SIGNIFICANT CHANGE UP (ref 3.3–5)
ALP SERPL-CCNC: 1466 U/L — HIGH (ref 40–120)
ALP SERPL-CCNC: 1486 U/L — HIGH (ref 40–120)
ALT FLD-CCNC: 85 U/L — HIGH (ref 10–45)
ALT FLD-CCNC: 89 U/L — HIGH (ref 10–45)
ANION GAP SERPL CALC-SCNC: 17 MMOL/L — SIGNIFICANT CHANGE UP (ref 5–17)
ANION GAP SERPL CALC-SCNC: 18 MMOL/L — HIGH (ref 5–17)
ANISOCYTOSIS BLD QL: SIGNIFICANT CHANGE UP
AST SERPL-CCNC: 42 U/L — HIGH (ref 10–40)
AST SERPL-CCNC: 52 U/L — HIGH (ref 10–40)
BASOPHILS # BLD AUTO: 0 K/UL — SIGNIFICANT CHANGE UP (ref 0–0.2)
BASOPHILS NFR BLD AUTO: 0 % — SIGNIFICANT CHANGE UP (ref 0–2)
BILIRUB SERPL-MCNC: 3.2 MG/DL — HIGH (ref 0.2–1.2)
BILIRUB SERPL-MCNC: 3.5 MG/DL — HIGH (ref 0.2–1.2)
BUN SERPL-MCNC: 35 MG/DL — HIGH (ref 7–23)
BUN SERPL-MCNC: 36 MG/DL — HIGH (ref 7–23)
CALCIUM SERPL-MCNC: 10.1 MG/DL — SIGNIFICANT CHANGE UP (ref 8.4–10.5)
CALCIUM SERPL-MCNC: 10.7 MG/DL — HIGH (ref 8.4–10.5)
CHLORIDE SERPL-SCNC: 93 MMOL/L — LOW (ref 96–108)
CHLORIDE SERPL-SCNC: 95 MMOL/L — LOW (ref 96–108)
CO2 SERPL-SCNC: 22 MMOL/L — SIGNIFICANT CHANGE UP (ref 22–31)
CO2 SERPL-SCNC: 23 MMOL/L — SIGNIFICANT CHANGE UP (ref 22–31)
CREAT SERPL-MCNC: 0.92 MG/DL — SIGNIFICANT CHANGE UP (ref 0.5–1.3)
CREAT SERPL-MCNC: 0.96 MG/DL — SIGNIFICANT CHANGE UP (ref 0.5–1.3)
EGFR: 70 ML/MIN/1.73M2 — SIGNIFICANT CHANGE UP
EGFR: 70 ML/MIN/1.73M2 — SIGNIFICANT CHANGE UP
EGFR: 74 ML/MIN/1.73M2 — SIGNIFICANT CHANGE UP
EGFR: 74 ML/MIN/1.73M2 — SIGNIFICANT CHANGE UP
EOSINOPHIL # BLD AUTO: 0 K/UL — SIGNIFICANT CHANGE UP (ref 0–0.5)
EOSINOPHIL NFR BLD AUTO: 0 % — SIGNIFICANT CHANGE UP (ref 0–6)
G6PD RBC-CCNC: 24.4 U/G HGB — HIGH (ref 7–20.5)
GLUCOSE BLDC GLUCOMTR-MCNC: 157 MG/DL — HIGH (ref 70–99)
GLUCOSE BLDC GLUCOMTR-MCNC: 161 MG/DL — HIGH (ref 70–99)
GLUCOSE SERPL-MCNC: 173 MG/DL — HIGH (ref 70–99)
GLUCOSE SERPL-MCNC: 202 MG/DL — HIGH (ref 70–99)
HCT VFR BLD CALC: 35.2 % — SIGNIFICANT CHANGE UP (ref 34.5–45)
HGB BLD-MCNC: 10 G/DL — LOW (ref 11.5–15.5)
IGM SERPL-MCNC: 30 MG/DL — LOW (ref 35–242)
LDH SERPL L TO P-CCNC: 235 U/L — SIGNIFICANT CHANGE UP (ref 50–242)
LDH SERPL L TO P-CCNC: 235 U/L — SIGNIFICANT CHANGE UP (ref 50–242)
LYMPHOCYTES # BLD AUTO: 1.37 K/UL — SIGNIFICANT CHANGE UP (ref 1–3.3)
LYMPHOCYTES # BLD AUTO: 2.7 % — LOW (ref 13–44)
MACROCYTES BLD QL: SLIGHT — SIGNIFICANT CHANGE UP
MAGNESIUM SERPL-MCNC: 1.9 MG/DL — SIGNIFICANT CHANGE UP (ref 1.6–2.6)
MAGNESIUM SERPL-MCNC: 2.1 MG/DL — SIGNIFICANT CHANGE UP (ref 1.6–2.6)
MANUAL SMEAR VERIFICATION: SIGNIFICANT CHANGE UP
MCHC RBC-ENTMCNC: 22.4 PG — LOW (ref 27–34)
MCHC RBC-ENTMCNC: 28.4 G/DL — LOW (ref 32–36)
MCV RBC AUTO: 78.9 FL — LOW (ref 80–100)
MICROCYTES BLD QL: SLIGHT — SIGNIFICANT CHANGE UP
MONOCYTES # BLD AUTO: 0 K/UL — SIGNIFICANT CHANGE UP (ref 0–0.9)
MONOCYTES NFR BLD AUTO: 0 % — LOW (ref 2–14)
NEUTROPHILS # BLD AUTO: 49.46 K/UL — HIGH (ref 1.8–7.4)
NEUTROPHILS NFR BLD AUTO: 97.3 % — HIGH (ref 43–77)
OVALOCYTES BLD QL SMEAR: SLIGHT — SIGNIFICANT CHANGE UP
PHOSPHATE SERPL-MCNC: 3.7 MG/DL — SIGNIFICANT CHANGE UP (ref 2.5–4.5)
PHOSPHATE SERPL-MCNC: 5.4 MG/DL — HIGH (ref 2.5–4.5)
PLAT MORPH BLD: NORMAL — SIGNIFICANT CHANGE UP
PLATELET # BLD AUTO: 523 K/UL — HIGH (ref 150–400)
POIKILOCYTOSIS BLD QL AUTO: SIGNIFICANT CHANGE UP
POTASSIUM SERPL-MCNC: 3.6 MMOL/L — SIGNIFICANT CHANGE UP (ref 3.5–5.3)
POTASSIUM SERPL-MCNC: 3.9 MMOL/L — SIGNIFICANT CHANGE UP (ref 3.5–5.3)
POTASSIUM SERPL-SCNC: 3.6 MMOL/L — SIGNIFICANT CHANGE UP (ref 3.5–5.3)
POTASSIUM SERPL-SCNC: 3.9 MMOL/L — SIGNIFICANT CHANGE UP (ref 3.5–5.3)
PROT SERPL-MCNC: 6.4 G/DL — SIGNIFICANT CHANGE UP (ref 6–8.3)
PROT SERPL-MCNC: 6.8 G/DL — SIGNIFICANT CHANGE UP (ref 6–8.3)
RBC # BLD: 4.46 M/UL — SIGNIFICANT CHANGE UP (ref 3.8–5.2)
RBC # FLD: 24.7 % — HIGH (ref 10.3–14.5)
RBC BLD AUTO: ABNORMAL
SODIUM SERPL-SCNC: 134 MMOL/L — LOW (ref 135–145)
SODIUM SERPL-SCNC: 134 MMOL/L — LOW (ref 135–145)
SPHEROCYTES BLD QL SMEAR: SLIGHT — SIGNIFICANT CHANGE UP
STOMATOCYTES BLD QL SMEAR: SLIGHT — SIGNIFICANT CHANGE UP
TARGETS BLD QL SMEAR: SLIGHT — SIGNIFICANT CHANGE UP
URATE SERPL-MCNC: 2.7 MG/DL — SIGNIFICANT CHANGE UP (ref 2.5–7)
URATE SERPL-MCNC: 3.1 MG/DL — SIGNIFICANT CHANGE UP (ref 2.5–7)
WBC # BLD: 50.83 K/UL — CRITICAL HIGH (ref 3.8–10.5)
WBC # FLD AUTO: 50.83 K/UL — CRITICAL HIGH (ref 3.8–10.5)

## 2025-05-27 PROCEDURE — 82248 BILIRUBIN DIRECT: CPT

## 2025-05-27 PROCEDURE — 87389 HIV-1 AG W/HIV-1&-2 AB AG IA: CPT

## 2025-05-27 PROCEDURE — 86664 EPSTEIN-BARR NUCLEAR ANTIGEN: CPT

## 2025-05-27 PROCEDURE — 85610 PROTHROMBIN TIME: CPT

## 2025-05-27 PROCEDURE — 84100 ASSAY OF PHOSPHORUS: CPT

## 2025-05-27 PROCEDURE — A9560: CPT

## 2025-05-27 PROCEDURE — 86901 BLOOD TYPING SEROLOGIC RH(D): CPT

## 2025-05-27 PROCEDURE — 83550 IRON BINDING TEST: CPT

## 2025-05-27 PROCEDURE — 86850 RBC ANTIBODY SCREEN: CPT

## 2025-05-27 PROCEDURE — 82310 ASSAY OF CALCIUM: CPT

## 2025-05-27 PROCEDURE — 83521 IG LIGHT CHAINS FREE EACH: CPT

## 2025-05-27 PROCEDURE — 83735 ASSAY OF MAGNESIUM: CPT

## 2025-05-27 PROCEDURE — 83540 ASSAY OF IRON: CPT

## 2025-05-27 PROCEDURE — 82232 ASSAY OF BETA-2 PROTEIN: CPT

## 2025-05-27 PROCEDURE — 82784 ASSAY IGA/IGD/IGG/IGM EACH: CPT

## 2025-05-27 PROCEDURE — 85384 FIBRINOGEN ACTIVITY: CPT

## 2025-05-27 PROCEDURE — 93306 TTE W/DOPPLER COMPLETE: CPT | Mod: 26

## 2025-05-27 PROCEDURE — 85730 THROMBOPLASTIN TIME PARTIAL: CPT

## 2025-05-27 PROCEDURE — 82728 ASSAY OF FERRITIN: CPT

## 2025-05-27 PROCEDURE — 87799 DETECT AGENT NOS DNA QUANT: CPT

## 2025-05-27 PROCEDURE — 86900 BLOOD TYPING SEROLOGIC ABO: CPT

## 2025-05-27 PROCEDURE — 93306 TTE W/DOPPLER COMPLETE: CPT

## 2025-05-27 PROCEDURE — 84550 ASSAY OF BLOOD/URIC ACID: CPT

## 2025-05-27 PROCEDURE — 83615 LACTATE (LD) (LDH) ENZYME: CPT

## 2025-05-27 PROCEDURE — 82607 VITAMIN B-12: CPT

## 2025-05-27 PROCEDURE — 94640 AIRWAY INHALATION TREATMENT: CPT

## 2025-05-27 PROCEDURE — 84165 PROTEIN E-PHORESIS SERUM: CPT

## 2025-05-27 PROCEDURE — 78472 GATED HEART PLANAR SINGLE: CPT

## 2025-05-27 PROCEDURE — 84238 ASSAY NONENDOCRINE RECEPTOR: CPT

## 2025-05-27 PROCEDURE — 86663 EPSTEIN-BARR ANTIBODY: CPT

## 2025-05-27 PROCEDURE — 82962 GLUCOSE BLOOD TEST: CPT

## 2025-05-27 PROCEDURE — 84155 ASSAY OF PROTEIN SERUM: CPT

## 2025-05-27 PROCEDURE — 82955 ASSAY OF G6PD ENZYME: CPT

## 2025-05-27 PROCEDURE — 36415 COLL VENOUS BLD VENIPUNCTURE: CPT

## 2025-05-27 PROCEDURE — 85025 COMPLETE CBC W/AUTO DIFF WBC: CPT

## 2025-05-27 PROCEDURE — 71250 CT THORAX DX C-: CPT

## 2025-05-27 PROCEDURE — 86705 HEP B CORE ANTIBODY IGM: CPT

## 2025-05-27 PROCEDURE — 86334 IMMUNOFIX E-PHORESIS SERUM: CPT

## 2025-05-27 PROCEDURE — 86803 HEPATITIS C AB TEST: CPT

## 2025-05-27 PROCEDURE — 83020 HEMOGLOBIN ELECTROPHORESIS: CPT

## 2025-05-27 PROCEDURE — 82330 ASSAY OF CALCIUM: CPT

## 2025-05-27 PROCEDURE — 85045 AUTOMATED RETICULOCYTE COUNT: CPT

## 2025-05-27 PROCEDURE — 87340 HEPATITIS B SURFACE AG IA: CPT

## 2025-05-27 PROCEDURE — 99233 SBSQ HOSP IP/OBS HIGH 50: CPT

## 2025-05-27 PROCEDURE — 83970 ASSAY OF PARATHORMONE: CPT

## 2025-05-27 PROCEDURE — 80053 COMPREHEN METABOLIC PANEL: CPT

## 2025-05-27 PROCEDURE — 86665 EPSTEIN-BARR CAPSID VCA: CPT

## 2025-05-27 RX ORDER — METOCLOPRAMIDE HCL 10 MG
1 TABLET ORAL
Qty: 56 | Refills: 1
Start: 2025-05-27 | End: 2025-06-23

## 2025-05-27 RX ORDER — NALOXONE HYDROCHLORIDE 0.4 MG/ML
1 INJECTION, SOLUTION INTRAMUSCULAR; INTRAVENOUS; SUBCUTANEOUS
Qty: 1 | Refills: 0
Start: 2025-05-27 | End: 2025-05-27

## 2025-05-27 RX ORDER — HYDROMORPHONE/SOD CHLOR,ISO/PF 2 MG/10 ML
1 SYRINGE (ML) INJECTION
Qty: 126 | Refills: 0
Start: 2025-05-27 | End: 2025-06-16

## 2025-05-27 RX ORDER — OXYCODONE HYDROCHLORIDE 30 MG/1
1 TABLET ORAL
Qty: 42 | Refills: 0
Start: 2025-05-27 | End: 2025-06-16

## 2025-05-27 RX ORDER — HYDROMORPHONE/SOD CHLOR,ISO/PF 2 MG/10 ML
4 SYRINGE (ML) INJECTION EVERY 4 HOURS
Refills: 0 | Status: DISCONTINUED | OUTPATIENT
Start: 2025-05-27 | End: 2025-05-27

## 2025-05-27 RX ORDER — HYDROMORPHONE/SOD CHLOR,ISO/PF 2 MG/10 ML
2 SYRINGE (ML) INJECTION EVERY 4 HOURS
Refills: 0 | Status: DISCONTINUED | OUTPATIENT
Start: 2025-05-27 | End: 2025-05-27

## 2025-05-27 RX ADMIN — Medication 0.5 MILLIGRAM(S): at 02:28

## 2025-05-27 RX ADMIN — SERTRALINE 50 MILLIGRAM(S): 100 TABLET, FILM COATED ORAL at 12:38

## 2025-05-27 RX ADMIN — Medication 0.5 MILLIGRAM(S): at 10:14

## 2025-05-27 RX ADMIN — PREDNISONE 100 MILLIGRAM(S): 20 TABLET ORAL at 12:39

## 2025-05-27 RX ADMIN — Medication 0.5 MILLIGRAM(S): at 07:18

## 2025-05-27 RX ADMIN — FILGRASTIM 480 MICROGRAM(S): 300 INJECTION, SOLUTION INTRAVENOUS; SUBCUTANEOUS at 12:38

## 2025-05-27 RX ADMIN — OXYCODONE HYDROCHLORIDE 30 MILLIGRAM(S): 30 TABLET ORAL at 06:54

## 2025-05-27 RX ADMIN — IPRATROPIUM BROMIDE AND ALBUTEROL SULFATE 3 MILLILITER(S): .5; 2.5 SOLUTION RESPIRATORY (INHALATION) at 05:55

## 2025-05-27 RX ADMIN — HEPARIN SODIUM 5000 UNIT(S): 1000 INJECTION INTRAVENOUS; SUBCUTANEOUS at 05:55

## 2025-05-27 RX ADMIN — Medication 40 MILLIGRAM(S): at 05:54

## 2025-05-27 RX ADMIN — Medication 4 MILLIGRAM(S): at 14:30

## 2025-05-27 RX ADMIN — Medication 300 MILLIGRAM(S): at 12:38

## 2025-05-27 RX ADMIN — INSULIN LISPRO 1: 100 INJECTION, SOLUTION INTRAVENOUS; SUBCUTANEOUS at 12:37

## 2025-05-27 RX ADMIN — OXYCODONE HYDROCHLORIDE 30 MILLIGRAM(S): 30 TABLET ORAL at 05:54

## 2025-05-27 RX ADMIN — Medication 0.5 MILLIGRAM(S): at 03:28

## 2025-05-27 RX ADMIN — Medication 0.5 MILLIGRAM(S): at 10:44

## 2025-05-27 RX ADMIN — AMLODIPINE BESYLATE 10 MILLIGRAM(S): 10 TABLET ORAL at 05:54

## 2025-05-27 RX ADMIN — Medication 0.5 MILLIGRAM(S): at 06:18

## 2025-05-27 RX ADMIN — INSULIN LISPRO 1: 100 INJECTION, SOLUTION INTRAVENOUS; SUBCUTANEOUS at 09:35

## 2025-05-27 RX ADMIN — HEPARIN SODIUM 5000 UNIT(S): 1000 INJECTION INTRAVENOUS; SUBCUTANEOUS at 12:38

## 2025-05-27 RX ADMIN — IPRATROPIUM BROMIDE AND ALBUTEROL SULFATE 3 MILLILITER(S): .5; 2.5 SOLUTION RESPIRATORY (INHALATION) at 12:37

## 2025-05-27 RX ADMIN — Medication 4 MILLIGRAM(S): at 13:47

## 2025-05-27 NOTE — PROGRESS NOTE ADULT - SUBJECTIVE AND OBJECTIVE BOX
SUBJECTIVE AND OBJECTIVE:      Indication for Geriatrics and Palliative Care Services/INTERVAL HPI: sx management in setting of new malignancy     OVERNIGHT EVENTS: patient seen this morning, awake and alert, resting in bed with her  at bedside. She reports pain to be better controlled after starting Oxycontin yesterday, though still requiring breakthrough doses of IV dilaudid. She thinks her discharge may be coming up and understands that IV Dilaudid prn will be transitioned to PO to prepare for home. Time spent on opiate education in conversation today. Pt is amenable to f/u with supportive oncology team after dc.     DNR on chart:   Allergies    azithromycin (Unknown)  Januvia (Rash)  Pepcid (Hives; Rash)    Intolerances    MEDICATIONS  (STANDING):  albuterol/ipratropium for Nebulization 3 milliLiter(s) Nebulizer every 6 hours  allopurinol 300 milliGRAM(s) Oral daily  amLODIPine   Tablet 10 milliGRAM(s) Oral daily  dextrose 5%. 1000 milliLiter(s) (100 mL/Hr) IV Continuous <Continuous>  dextrose 5%. 1000 milliLiter(s) (50 mL/Hr) IV Continuous <Continuous>  dextrose 50% Injectable 25 Gram(s) IV Push once  dextrose 50% Injectable 12.5 Gram(s) IV Push once  dextrose 50% Injectable 25 Gram(s) IV Push once  filgrastim-sndz (ZARXIO) Injectable 480 MICROGram(s) SubCutaneous daily  glucagon  Injectable 1 milliGRAM(s) IntraMuscular once  heparin   Injectable 5000 Unit(s) SubCutaneous every 8 hours  insulin glargine Injectable (LANTUS) 10 Unit(s) SubCutaneous at bedtime  insulin lispro (ADMELOG) corrective regimen sliding scale   SubCutaneous three times a day before meals  insulin lispro (ADMELOG) corrective regimen sliding scale   SubCutaneous at bedtime  oxyCODONE  ER Tablet 30 milliGRAM(s) Oral every 12 hours  pantoprazole    Tablet 40 milliGRAM(s) Oral before breakfast  polyethylene glycol 3350 17 Gram(s) Oral daily  predniSONE   Tablet 100 milliGRAM(s) Oral every 24 hours  senna 2 Tablet(s) Oral at bedtime  sertraline 50 milliGRAM(s) Oral daily  sodium chloride 0.9%. 1000 milliLiter(s) (40 mL/Hr) IV Continuous <Continuous>    MEDICATIONS  (PRN):  acetaminophen     Tablet .. 650 milliGRAM(s) Oral every 6 hours PRN Temp greater or equal to 38C (100.4F), Mild Pain (1 - 3)  aluminum hydroxide/magnesium hydroxide/simethicone Suspension 30 milliLiter(s) Oral every 4 hours PRN Dyspepsia  dextrose Oral Gel 15 Gram(s) Oral once PRN Blood Glucose LESS THAN 70 milliGRAM(s)/deciliter  HYDROmorphone   Tablet 4 milliGRAM(s) Oral every 4 hours PRN Severe Pain (7 - 10)  HYDROmorphone   Tablet 2 milliGRAM(s) Oral every 4 hours PRN Moderate Pain (4 - 6)  melatonin 3 milliGRAM(s) Oral at bedtime PRN Insomnia  metoclopramide Injectable 10 milliGRAM(s) IV Push every 6 hours PRN nausea/vomitting  ondansetron Injectable 8 milliGRAM(s) IV Push every 8 hours PRN Nausea and/or Vomiting      ITEMS UNCHECKED ARE NOT PRESENT    PRESENT SYMPTOMS: [ ]Unable to self-report - see [ ] CPOT [ ] PAINADS [ ] RDOS  Source if other than patient:  [ ]Family   [ ]Team     Pain: [ x]yes [ ]no  QOL impact - unable to be comfortable throughout the day  Location -  abdomen, R. sided flank               Aggravating factors - none  Quality - excruciating  Radiation - down R leg  Timing- constant   Severity (0-10 scale): 10  Minimal acceptable level (0-10 scale): 0    CPOT:    https://www.s[facility].org/getattachment/xyu91j13-5n5g-9j5n-5v6h-1736n7234x8h/Critical-Care-Pain-Observation-Tool-(CPOT)    Dyspnea:                           [ ]Mild [ ]Moderate [ ]Severe  Anxiety:                             [ ]Mild [ ]Moderate [ ]Severe  Fatigue:                             [ ]Mild [ ]Moderate [ ]Severe  Nausea:                             [ ]Mild [ ]Moderate [ ]Severe  Loss of appetite:              [ ]Mild [ ]Moderate [ ]Severe  Constipation:                    [ ]Mild [ ]Moderate [ ]Severe  Other Symptoms:  [x ]All other review of systems negative     PCSSQ[Palliative Care Spiritual Screening Question]   Severity (0-10):  Score of 4 or > indicate consideration of Chaplaincy referral.  Chaplaincy Referral: [ ] yes [ ] refused [ ] following [x ] Deferred     Caregiver [place]? : [ ] yes [ x] no [ ] Deferred [ ] Declined             Social work referral [ ] Patient & Family Centered Care Referral [ ]     Anticipatory Grief present?:  [ ] yes [ x] no  [ ] Deferred                  Social work referral [ ] Chaplaincy Referral[ ]    PHYSICAL EXAM:  Vital Signs Last 24 Hrs  T(C): 36.4 (27 May 2025 09:18), Max: 37.3 (26 May 2025 21:30)  T(F): 97.6 (27 May 2025 09:18), Max: 99.2 (26 May 2025 21:30)  HR: 95 (27 May 2025 09:18) (90 - 108)  BP: 152/70 (27 May 2025 09:18) (115/73 - 152/70)  BP(mean): --  RR: 18 (27 May 2025 09:18) (17 - 18)  SpO2: 96% (27 May 2025 09:18) (92% - 96%)    Parameters below as of 27 May 2025 09:18  Patient On (Oxygen Delivery Method): room air      GENERAL: [ ]Cachexia    [x ]Alert  [x ]Oriented x 3  [ ]Lethargic  [ ]Unarousable  [x ]Verbal  [ ]Non-Verbal  Behavioral:   [ ] Anxiety  [ ] Delirium [ ] Agitation [ ] Other  HEENT:  [ ]Normal   [ ]Dry mouth   [ ]ET Tube/Trach  [ ]Oral lesions [x] sclera icteric  PULMONARY:   [x ]Clear [ ]Tachypnea  [ ]Audible excessive secretions   [ ]Rhonchi        [ ]Right [ ]Left [ ]Bilateral  [ ]Crackles        [ ]Right [ ]Left [ ]Bilateral  [ ]Wheezing     [ ]Right [ ]Left [ ]Bilateral  [ ]Diminished breath sounds [ ]right [ ]left [ ]bilateral  CARDIOVASCULAR:    [ x]Regular [ ]Irregular [ ]Tachy  [ ]Dionte [ ]Murmur [ ]Other  GASTROINTESTINAL:  [x ]Soft  [ ]Distended   [ ]+BS  [ ]Non tender [x ]Tender  [ ]Other [ ]PEG [ ]OGT/ NGT  Last BM: 5/18?  GENITOURINARY:  [ x]Normal [ ] Incontinent   [ ]Oliguria/Anuria   [ ][place]a  MUSCULOSKELETAL:   [ x]Normal   [ ]Weakness  [ ]Bed/Wheelchair bound [ ]Edema  NEUROLOGIC:   [ x]No focal deficits  [ ]Cognitive impairment  [ ]Dysphagia [ ]Dysarthria [ ]Paresis [ ]Other   SKIN:   [ ]Normal  [ ]Rash  [x ]Other diffusely jaundiced  [ ]Pressure ulcer(s)       Present on admission [ ]y [ ]n    CRITICAL CARE:  [ ]Shock Present  [ ]Septic [ ]Cardiogenic [ ]Neurologic [ ]Hypovolemic  [ ]Vasopressors [ ]Inotropes  [ ]Respiratory failure present [ ]Mechanical Ventilation [ ]Non-invasive ventilatory support [ ]High-Flow   [ ]Acute  [ ]Chronic [ ]Hypoxic  [ ]Hypercarbic [ ]Other  [ ]Other organ failure     LABS:                          10.0   50.83 )-----------( 523      ( 27 May 2025 07:15 )             35.2     05-27    134[L]  |  95[L]  |  35[H]  ----------------------------<  202[H]  3.9   |  22  |  0.92    Ca    10.1      27 May 2025 07:14  Phos  5.4     05-27  Mg     2.1     05-27    TPro  6.8  /  Alb  3.6  /  TBili  3.2[H]  /  DBili  x   /  AST  42[H]  /  ALT  85[H]  /  AlkPhos  1466[H]  05-27      RADIOLOGY & ADDITIONAL STUDIES: reviewed    Protein Calorie Malnutrition Present: [ ]mild [ ]moderate [ ]severe [ ]underweight [ ]morbid obesity  https://www.andeal.org/vault/2440/web/files/ONC/Table_Clinical%20Characteristics%20to%20Document%20Malnutrition-White%20JV%20et%20al%202012.pdf    Height (cm): 166 (05-22-25 @ 19:55), 167.6 (05-21-25 @ 20:37), 167.6 (05-06-25 @ 07:55)  Weight (kg): 121.6 (05-22-25 @ 19:55), 120 (05-21-25 @ 20:37), 123.4 (05-06-25 @ 07:55)  BMI (kg/m2): 44.1 (05-22-25 @ 19:55), 42.7 (05-21-25 @ 20:37), 43.9 (05-06-25 @ 07:55)    [ ]PPSV2 < or = 30%  [ ]significant weight loss [ ]poor nutritional intake [ ]anasarca [ ]Artificial Nutrition    Other REFERRALS:  [ ]Hospice  [ ]Child Life  [ ]Social Work  [ ]Case management [ ]Holistic Therapy

## 2025-05-27 NOTE — PROGRESS NOTE ADULT - PROBLEM SELECTOR PLAN 2
Resumed regimen from [facility]. Appreciate palliative care for pain management  Continues on oxycodone and IV dilaudid as needed for pain   Plan for discharge when stable on PO regimen

## 2025-05-27 NOTE — PROGRESS NOTE ADULT - PROBLEM SELECTOR PLAN 1
- started prednisone 100mg daily for 5 days   - started allopurinol 300mg daily   - f/u CT chest w contrast   - ordered: coags, fibrinogen, LDH, uric acid, G6PD, hepatitis B serologies (including core antibody total) and hepatitis C, HIV  - Trend TLS labs every 12 hours (CMP, Phos, LDH, uric acid)   Give rasburicase 3mg IV for uric acid > 8.0  - Follow up anemia workup, immunoelectrophoresis, EBV, ionized calcium, intact PTH, Bone marrow biopsy   S/P cytoxan and rituximab on 5/23.  Transfuse if Hgb < 7.0. Transfuse if platelets <10K, or <15K if febrile. Transfuse for platelets <50K if bleeding. - started prednisone 100mg daily for 5 days   - started allopurinol 300mg daily   - f/u CT chest w contrast   - ordered: coags, fibrinogen, LDH, uric acid, G6PD, hepatitis B serologies (including core antibody total) and hepatitis C, HIV  - Trend TLS labs every 12 hours (CMP, Phos, LDH, uric acid)   Give rasburicase 3mg IV for uric acid > 8.0  - Follow up anemia workup, immunoelectrophoresis, EBV, ionized calcium, intact PTH, Bone marrow biopsy   S/P cytoxan and rituximab on 5/23.  Transfuse if Hgb < 7.0. Transfuse if platelets <10K, or <15K if febrile. Transfuse for platelets <50K if bleeding.  5/27 discharge home today on oral dilaudid and oxycontin, allopurinol for TLS. follow up with Dr. Justice tomorrow.

## 2025-05-27 NOTE — PROGRESS NOTE ADULT - PROBLEM SELECTOR PLAN 3
-GI discussed with Dr. Justice at [facility], ERCP was held, pt was resumed on diet.   Grade 3 transaminitis. Trend liver enzymes daily

## 2025-05-27 NOTE — PROGRESS NOTE ADULT - NS ATTEND AMEND GEN_ALL_CORE FT
Pt is a 55 yo woman with newy dx'd DLBCL, non-GCB type transferred from [facility] with obstructive jaundice due to lymphoma, abd  pain, diarrhea. She has had severe weight loss, malaise, anorexia, N/V.  She has a h/o T2DM, GERD, asthma, HLD, obesity.    PMHx newly dx'd DLBCL, was admitted at [facility] for jaundice and diarrhea x1wk, green in color; also reported malaise, anorexia, n/v (NBNB emesis), and 70 lb unintentional wt loss in 1-2wk.   CT a/p showed moderate intra/extrahepatic biliary ductal dilation w/ abrupt cutoff in CBD at the hilar with soft tissue measuring up to 4x4cm c/f lymph node mass, enlarged RP and retrocrural LAD, urinary bladder wall thickening, mild/mod R side pleural effusion, HSM w/ lesions, c/f mets.   For obstructive jaundice, GI was consulted there, no ERCP rec at the time.   Pt was transferred to [facility] for inpatient chemo. s/p Cytoxan 1000 g/sq m and rituxan 375 mg/sq m iv on 5/23, today is day 4  LFTs markedly abnl, TB 12.2, [id number], ,     Hypercalcemic  Discussed plan to relieve biliary obstruction with rituximab and cytoxan as pretherapy for standard anthracycline based therapy. Bilirubin improving - today is 3.2  FISH for MYC, BCL2, BCL6 rearrangement pending.  Incr IVFs to 150 cc/hr for chemo, can decrease to 100 cc/hr now  allopurinol  rasburicase if urate > 8  TLS labs 2x/d  Daily LFTs - bili improving post Ritux/Cytox  coags 2x/d  I & O  diurese as needed  will use peripheral iv access and aim for OPD mediport placement    OOB Pt is a 55 yo woman with newy dx'd DLBCL, non-GCB type transferred from [facility] with obstructive jaundice due to lymphoma, abd  pain, diarrhea. She has had severe weight loss, malaise, anorexia, N/V.  She has a h/o T2DM, GERD, asthma, HLD, obesity.    PMHx newly dx'd DLBCL, was admitted at [facility] for jaundice and diarrhea x1wk, green in color; also reported malaise, anorexia, n/v (NBNB emesis), and 70 lb unintentional wt loss in 1-2wk.   CT a/p showed moderate intra/extrahepatic biliary ductal dilation w/ abrupt cutoff in CBD at the hilar with soft tissue measuring up to 4x4cm c/f lymph node mass, enlarged RP and retrocrural LAD, urinary bladder wall thickening, mild/mod R side pleural effusion, HSM w/ lesions, c/f mets.   For obstructive jaundice, GI was consulted there, no ERCP rec at the time.   Pt was transferred to [facility] for inpatient chemo. Discussed plan to relieve biliary obstruction with rituximab and cytoxan as pretherapy for standard anthracycline based therapy. Risks, benefits, toxicities of therapy d/w pt and  and consent obtained on 5/23.  s/p Cytoxan 1000 g/sq m and rituxan 375 mg/sq m iv on 5/23, today is day 5  LFTs markedly abnl on admit, TB 12.2, [id number], ,     Hypercalcemic  Now with LFTs improving, bili 3.2 today, pain better (being transitioned to po pain meds), calcium falling    FISH for MYC, BCL2, BCL6 rearrangement pending.   allopurinol  rasburicase if urate > 8  TLS labs   using  peripheral iv access and aim for OPD mediport placement  for d/c home today; received four days G-CSF, to see Dr. Justice tomorrow at [facility] (5/28)  should be able to get R-CHOP either in full doses or dose decreases in adriamycin and VCR depending on LFTs, mignon bili  Can aim to try to get port in right before, post count recovery    OOB

## 2025-05-27 NOTE — DISCHARGE NOTE NURSING/CASE MANAGEMENT/SOCIAL WORK - NSDCVIVACCINE_GEN_ALL_CORE_FT
Tdap; 20-Apr-2021 15:59; Keke Finley (DIONICIO); [employer]; [id number] (Exp. Date: 18-Nov-2022); IntraMuscular; Deltoid Left.; 0.5 milliLiter(s); VIS (VIS Published: 09-May-2013, VIS Presented: 20-Apr-2021);

## 2025-05-27 NOTE — DISCHARGE NOTE NURSING/CASE MANAGEMENT/SOCIAL WORK - FINANCIAL ASSISTANCE
[facility] provides services at a reduced cost to those who are determined to be eligible through [facility]’s financial assistance program. Information regarding [facility]’s financial assistance program can be found by going to [email or web address] or by calling [phone].

## 2025-05-27 NOTE — PROGRESS NOTE ADULT - ASSESSMENT
54F PMHx recently dx'd DLBCL, transferred from [facility] with obstructive jaundice to start chemotherapy inpatient. 
54F PMHx recently dx'd DLBCL, transferred from [facility] with obstructive jaundice to start chemotherapy inpatient. 
54F who presented to [facility] for jaundice and diarrhea x1wk, green in color; also reported malaise, anorexia, n/v (NBNB emesis), and 70 lb unintentional wt loss in 1-2wk. CT a/p showed moderate intra/extrahepatic biliary ductal dilation w/ abrupt cutoff in CBD at the hilar with soft tissue measuring up to 4x4cm c/f lymph node mass, enlarged RP and retrocrural LAD, urinary bladder wall thickening, mild/mod R side pleural effusion, HSM w/ lesions, c/f mets. Pt with newly diagnosed DLBCL and is transferred to [facility] for inpatient chemo. Geriatrics and Palliative Medicine Team is consulted for cancer related pain
54F PMHx recently dx'd DLBCL, transferred from [facility] with obstructive jaundice to start chemotherapy inpatient. 
54F who presented to [facility] for jaundice and diarrhea x1wk, green in color; also reported malaise, anorexia, n/v (NBNB emesis), and 70 lb unintentional wt loss in 1-2wk. CT a/p showed moderate intra/extrahepatic biliary ductal dilation w/ abrupt cutoff in CBD at the hilar with soft tissue measuring up to 4x4cm c/f lymph node mass, enlarged RP and retrocrural LAD, urinary bladder wall thickening, mild/mod R side pleural effusion, HSM w/ lesions, c/f mets. Pt with newly diagnosed DLBCL and is transferred to [facility] for inpatient chemo. Geriatrics and Palliative Medicine Team is consulted for cancer related pain
54F who presented to [facility] for jaundice and diarrhea x1wk, green in color; also reported malaise, anorexia, n/v (NBNB emesis), and 70 lb unintentional wt loss in 1-2wk. CT a/p showed moderate intra/extrahepatic biliary ductal dilation w/ abrupt cutoff in CBD at the hilar with soft tissue measuring up to 4x4cm c/f lymph node mass, enlarged RP and retrocrural LAD, urinary bladder wall thickening, mild/mod R side pleural effusion, HSM w/ lesions, c/f mets. Pt with newly diagnosed DLBCL and is transferred to [facility] for inpatient chemo. Geriatrics and Palliative Medicine Team is consulted for cancer related pain
54F PMHx recently dx'd DLBCL, transferred from [facility] with obstructive jaundice to start chemotherapy inpatient. 
54F PMHx recently dx'd DLBCL, transferred from [facility] with obstructive jaundice to start chemotherapy inpatient.

## 2025-05-27 NOTE — PROGRESS NOTE ADULT - PROBLEM SELECTOR PROBLEM 2
Cancer related pain

## 2025-05-27 NOTE — PROGRESS NOTE ADULT - SUBJECTIVE AND OBJECTIVE BOX
Diagnosis: DLBCL non germinal center     Protocol/Chemo Regimen: Cytoxan and Rituximab     Day: 5    Subjective:  +abdominal distention improved.    Review of Systems: Denies nausea, vomiting, diarrhea, chest pain, SOB     Pain scale:  abdominal pain    Allergies:  azithromycin (Unknown)  Januvia (Rash)  Pepcid (Hives; Rash)   Diagnosis: DLBCL non germinal center     Protocol/Chemo Regimen: Cytoxan and Rituximab     Day: 5    Subjective: feels much better, abdominal pain and distention improved    Review of Systems: Denies nausea, vomiting, diarrhea, chest pain, SOB     Pain scale:  abdominal pain    Allergies:  azithromycin (Unknown)  Januvia (Rash)  Pepcid (Hives; Rash)    HEME/ONC MEDICATIONS  heparin   Injectable 5000 Unit(s) SubCutaneous every 8 hours      STANDING MEDICATIONS  albuterol/ipratropium for Nebulization 3 milliLiter(s) Nebulizer every 6 hours  allopurinol 300 milliGRAM(s) Oral daily  amLODIPine   Tablet 10 milliGRAM(s) Oral daily  dextrose 5%. 1000 milliLiter(s) IV Continuous <Continuous>  dextrose 5%. 1000 milliLiter(s) IV Continuous <Continuous>  dextrose 50% Injectable 25 Gram(s) IV Push once  dextrose 50% Injectable 12.5 Gram(s) IV Push once  dextrose 50% Injectable 25 Gram(s) IV Push once  filgrastim-sndz (ZARXIO) Injectable 480 MICROGram(s) SubCutaneous daily  glucagon  Injectable 1 milliGRAM(s) IntraMuscular once  insulin glargine Injectable (LANTUS) 10 Unit(s) SubCutaneous at bedtime  insulin lispro (ADMELOG) corrective regimen sliding scale   SubCutaneous three times a day before meals  insulin lispro (ADMELOG) corrective regimen sliding scale   SubCutaneous at bedtime  oxyCODONE  ER Tablet 30 milliGRAM(s) Oral every 12 hours  pantoprazole    Tablet 40 milliGRAM(s) Oral before breakfast  polyethylene glycol 3350 17 Gram(s) Oral daily  senna 2 Tablet(s) Oral at bedtime  sertraline 50 milliGRAM(s) Oral daily  sodium chloride 0.9%. 1000 milliLiter(s) IV Continuous <Continuous>    PRN MEDICATIONS  acetaminophen     Tablet .. 650 milliGRAM(s) Oral every 6 hours PRN  aluminum hydroxide/magnesium hydroxide/simethicone Suspension 30 milliLiter(s) Oral every 4 hours PRN  dextrose Oral Gel 15 Gram(s) Oral once PRN  HYDROmorphone   Tablet 4 milliGRAM(s) Oral every 4 hours PRN  HYDROmorphone   Tablet 2 milliGRAM(s) Oral every 4 hours PRN  melatonin 3 milliGRAM(s) Oral at bedtime PRN  metoclopramide Injectable 10 milliGRAM(s) IV Push every 6 hours PRN  ondansetron Injectable 8 milliGRAM(s) IV Push every 8 hours PRN    Vital Signs Last 24 Hrs  T(C): 36.6 (27 May 2025 16:45), Max: 37.3 (26 May 2025 21:30)  T(F): 97.8 (27 May 2025 16:45), Max: 99.2 (26 May 2025 21:30)  HR: 96 (27 May 2025 16:45) (90 - 108)  BP: 143/79 (27 May 2025 16:45) (117/73 - 152/70)  RR: 17 (27 May 2025 16:45) (17 - 18)  SpO2: 94% (27 May 2025 16:45) (94% - 97%)    Parameters below as of 27 May 2025 16:45  Patient On (Oxygen Delivery Method): room air    PHYSICAL EXAM  General: adult in NAD  HEENT: clear oropharynx, no erythema, no ulcers  CV: normal S1, S2, RRR  Lungs: clear to auscultation, no wheezes, no rales  Abdomen: soft, nontender, nondistended, normal BS  Ext: no edema  Skin: no rash  Neuro: alert and oriented x 3  Central line: normal     LABS:                      10.0   50.83 )-----------( 523      ( 27 May 2025 07:15 )             35.2     Mean Cell Volume : 78.9 fl  Mean Cell Hemoglobin : 22.4 pg  Mean Cell Hemoglobin Concentration : 28.4 g/dL  Auto Neutrophil # : x  Auto Lymphocyte # : x  Auto Monocyte # : x  Auto Eosinophil # : x  Auto Basophil # : x  Auto Neutrophil % : x  Auto Lymphocyte % : x  Auto Monocyte % : x  Auto Eosinophil % : x  Auto Basophil % : x      05-27    134[L]  |  95[L]  |  35[H]  ----------------------------<  202[H]  3.9   |  22  |  0.92    Ca    10.1      27 May 2025 07:14  Phos  5.4     05-27  Mg     2.1     05-27    TPro  6.8  /  Alb  3.6  /  TBili  3.2[H]  /  DBili  x   /  AST  42[H]  /  ALT  85[H]  /  AlkPhos  1466[H]  05-27    PT/INR - ( 26 May 2025 06:53 )   PT: 10.1 sec;   INR: 0.88 ratio    PTT - ( 26 May 2025 06:53 )  PTT:24.8 sec      Uric Acid 2.7      Uric Acid 3.1    ------------------    Cultures:  Culture - Urine (05.22.25 @ 00:45)    Specimen Source: Clean Catch   Culture Results:   <10,000 CFU/mL Normal Urogenital Cintia    Urinalysis with Rflx Culture (05.22.25 @ 00:45)    Urine Appearance: Cloudy   Color: Dark Yellow   Specific Gravity: 1.028   pH Urine: 6.0   Protein, Urine: 30 mg/dL   Glucose Qualitative, Urine: Negative mg/dL   Ketone , Urine: Trace mg/dL   Blood, Urine: Negative   Bilirubin: Large   Urobilinogen: 1.0 mg/dL   Leukocyte Esterase Concentration: Small   Nitrite: Positive    -----------    RADIOLOGY & ADDITIONAL STUDIES:  from: CT Chest No Cont (05.23.25 @ 14:47)   IMPRESSION:  Lymphadenopathy involving the cervical, mediastinal, hilar and   retrocrural lymph nodes, consistent with known lymphoma.    Small right pleural effusion.

## 2025-05-27 NOTE — PROGRESS NOTE ADULT - PROBLEM SELECTOR PROBLEM 1
Diffuse large B cell lymphoma

## 2025-05-27 NOTE — PROGRESS NOTE ADULT - PROBLEM SELECTOR PLAN 6
VTE ppx: hep sq   GI ppx: home ppi   Med rec: reviewed [facility] record.   Diet: DM diet.
VTE ppx: hep sq   GI ppx: home ppi   Med rec: reviewed [facility] record.   Diet: DM diet.
VTE ppx: hep sq   GI ppx: home ppi   Med rec: reviewed [facility] record.   Diet: DM diet.
VTE ppx: hep sq   GI ppx: home ppi   Med rec: reviewed [facility] record.   Diet: DM diet.
VTE ppx: hep sq   GI ppx: home ppi   Med rec: reviewed [facility] record.   Diet: DM diet.

## 2025-05-27 NOTE — PROGRESS NOTE ADULT - PROBLEM SELECTOR PROBLEM 3
Obstructive jaundice
Obstructive jaundice
Palliative care encounter
Obstructive jaundice

## 2025-05-27 NOTE — PROGRESS NOTE ADULT - PROBLEM/PLAN-5
Ambulated patient O2 stat at 94 percent maintained O2 above 91% during ambulation and when returning to room patients O2 stat returned to 94% at rest     [facility]  05/20/22 7900
DISPLAY PLAN FREE TEXT

## 2025-05-27 NOTE — DISCHARGE NOTE NURSING/CASE MANAGEMENT/SOCIAL WORK - PATIENT PORTAL LINK FT
You can access the FollowMyHealth Patient Portal offered by [facility] by registering at the following website: [email or web address]. By joining [facility]’s FollowMyHealth portal, you will also be able to view your health information using other applications (apps) compatible with our system.

## 2025-05-27 NOTE — PHARMACOTHERAPY INTERVENTION NOTE - COMMENTS
Clinical Pharmacy Specialist- Hematology/Oncology- Progress Note    Pt is    Antimicrobial Course:  -None needed currently  MRSA nasal swab    Last Neutropenic (ANC<1000): no occurrence  Last Febrile:  no occurrence  Days Non-Neutropenic: 6  Days afebrile: 6    Chemotherapy Course  -Current Regimen: R-CHOP (no doxo, vinc for elevated t.bili)  History:  (5/23/25)  (21-day cycles)  -Rituximab 375mg/m2 IVPB D1  -Cyclophosphamide 750mg/m2 IVPB D1  -Vincristine 1.4mg/m2 (max 2mg) IVPB D1  -Prednisone 100mg PO daily D1-5   -Day:  BmBx:  Access:     ***Drug Levels:   Date    Dose           Value           Ref Range	                                        Status   	                                  Trough 10-20; AUC= 400-600	   ***Final/Pending     History/Relevant clinical information used in assessment:  -Crcl= ***;Scr=***; Wt=*** ; ***Adjusted BW used since pt BMI>30/ Actual BW used since BMI<30    Assessment/Plan/Recommendation:      Additional Monitoring Needed?   -Yes- Continue to monitor renal function & daily counts for abx escalation/de-escalation   -Discharge Planning:  --> New meds:  --> Meds sent for auth:  --> Delivered meds:    Case discussed with attending/primary team    Nakul Krishna, PharmD, BCPS  Clinical Pharmacy Specialist | Hematology/Oncology  [facility]  Email: [email or web address] or available on [employer]  Clinical Pharmacy Specialist- Hematology/Oncology- Progress Note    Pt is a 53 y/o female with newly diagnosed DLBCL (non germinal center)    Antimicrobial Course:  -None needed currently  MRSA nasal swab    Last Neutropenic (ANC<1000): no occurrence  Last Febrile:  no occurrence  Days Non-Neutropenic: 6  Days afebrile: 6    Chemotherapy Course  -Current Regimen: R-CHOP (no doxo, vinc for elevated t.bili)  History:  (5/23/25)  (21-day cycles)  -Rituximab 375mg/m2 IVPB D1  -Cyclophosphamide 750mg/m2 IVPB D1  -Prednisone 100mg PO daily D1-5   -Day: 5 (5/27)  BmBx:  Access:     History/Relevant clinical information used in assessment:  -     Assessment/Plan/Recommendation:  Onc:  - defer doxorubicin/ vincristine to 1-2 wks from now as t.bili decreases  - allopurinol 300mf PO daily - cont for a few more days on d/c  - Zarxio 480mcg SC daily 5/24- 5/27- 4 days- can d/c now - 5/27  Pain (palliative folowing):  -     Additional Monitoring Needed?   -Yes- Continue to monitor renal function & daily counts for abx escalation/de-escalation   -Discharge Planning:  --> New meds:  --> Meds sent for auth:  --> Delivered meds:    Case discussed with attending/primary team    Nakul Krishna, PharmD, BCPS  Clinical Pharmacy Specialist | Hematology/Oncology  [facility]  Email: [email or web address] or available on [employer]  Clinical Pharmacy Specialist- Hematology/Oncology- Progress Note    Pt is a 53 y/o female with PMH of GERD, obesity, asthma, T2DM, HTN, newly diagnosed DLBCL (non germinal center), admitted for obstructive jaundice and management with R-CHOP (no vincristine, no doxorubicin for hyperbilirubinemia     Antimicrobial Course:  -None needed currently  MRSA nasal swab    Last Neutropenic (ANC<1000): no occurrence  Last Febrile:  no occurrence  Days Non-Neutropenic: 6  Days afebrile: 6    Chemotherapy Course  -Current Regimen: R-CHOP (no doxo, vinc for elevated t.bili)  History:  (5/23/25)  (21-day cycles)  -Rituximab 375mg/m2 IVPB D1  -Cyclophosphamide 750mg/m2 IVPB D1  -Prednisone 100mg PO daily D1-5   -Day: 5 (5/27)  BmBx:  Access:     History/Relevant clinical information used in assessment:  - 5/22- Ht: 5'3"; IBW= 52.4kg; Actual BW= 121.6kg; Adj BW= 69kg; BMI= 44.1    Assessment/Plan/Recommendation:  Onc:  Chemo Schedule:  -Rituximab 375mg/m2 IVPB D1  -Cyclophosphamide 750mg/m2 IVPB D1  -Prednisone 100mg PO daily D1-5 - given 5/23- last dose today 5/27  - defer doxorubicin/ vincristine to 1-2 wks from now as t.bili decreases  - allopurinol 300mf PO daily - cont for a few more days on d/c  - Zarxio 480mcg SC daily 5/24- 5/27- 4 days- can d/c now - 5/27  Pain (palliative following):  - 5/27- meds converted to PO to facilitate discharge  Dispo:  - 5/27- recommend to send reglan prn n/v (last zofran 16mg 4 days ago & has not required prn's), to reduce possible constipation with zofran,  can consider protonix for ~5 days - was getting here, last prednisone dose this am, eRx Narcan nasal spray with opioids  - Meds sent for auth: allopurinol x 5, hydromorphone 4mg tabs, oxycodone 30mg bid, narcan nasal spray, reglan prn- sent ot [facility] 5/27   Additional Monitoring Needed?   -Yes- Continue to monitor renal function & daily counts for abx escalation/de-escalation   -Discharge Planning:  --> New meds:  --> Meds sent for auth: allopurinol x 5, hydromorphone 4mg tabs, oxycodone 30mg bid, narcan nasal spray, reglan prn- sent ot [facility] 5/27   --> Delivered meds:    Case discussed with attending/primary team    Nakul Krishna, PharmD, BCPS  Clinical Pharmacy Specialist | Hematology/Oncology  [facility]  Email: [email or web address] or available on [employer]

## 2025-05-27 NOTE — PROGRESS NOTE ADULT - PROBLEM SELECTOR PLAN 3
- Geriatrics and Palliative Medicine Team will continue to follow for pain control. Will send referral for outpatient supportive oncology to f/u with patient following hospital d/c    Yesenia Guzman MD  [facility] Team Consults  Please call if we can be of assistance, [phone]

## 2025-05-27 NOTE — PROGRESS NOTE ADULT - PROBLEM SELECTOR PLAN 2
In setting of diffuse disease burden noted on CT scan  - Continue Oxycontin 30mg BID (5/26)    - d/c oxycodone 10 mg q8 hours prn    - d/c IV dilaudid prn, start PO dilaudid 2-4 mg q4 hours prn mod-severe pain  - bowel regimen while on opioids   - narcan prn  - Patient would benefit from establishing care, I will communicate with office today to make referral   Dr. Ordonez, Dr. Lashonda Gonzalez, Mercedez Hernández NP, Soo Weinberg NP    [facility], [facility]  Phone: [phone]  [address] [place] [address]

## 2025-05-28 ENCOUNTER — OUTPATIENT (OUTPATIENT)
Dept: OUTPATIENT SERVICES | Facility: HOSPITAL | Age: 55
LOS: 1 days | End: 2025-05-28

## 2025-05-28 ENCOUNTER — APPOINTMENT (OUTPATIENT)
Dept: ULTRASOUND IMAGING | Facility: CLINIC | Age: 55
End: 2025-05-28
Payer: MEDICAID

## 2025-05-28 ENCOUNTER — APPOINTMENT (OUTPATIENT)
Dept: INTERVENTIONAL RADIOLOGY/VASCULAR | Facility: CLINIC | Age: 55
End: 2025-05-28

## 2025-05-28 ENCOUNTER — APPOINTMENT (OUTPATIENT)
Dept: HEMATOLOGY ONCOLOGY | Facility: CLINIC | Age: 55
End: 2025-05-28

## 2025-05-28 DIAGNOSIS — C83.30 DIFFUSE LARGE B-CELL LYMPHOMA, UNSPECIFIED SITE: ICD-10-CM

## 2025-05-28 DIAGNOSIS — Z98.89 OTHER SPECIFIED POSTPROCEDURAL STATES: Chronic | ICD-10-CM

## 2025-05-28 LAB — STFR SERPL-MCNC: 44.7 NMOL/L — HIGH (ref 12.2–27.3)

## 2025-05-28 PROCEDURE — 93971 EXTREMITY STUDY: CPT | Mod: 26,LT

## 2025-05-28 PROCEDURE — 99215 OFFICE O/P EST HI 40 MIN: CPT

## 2025-05-30 ENCOUNTER — TRANSCRIPTION ENCOUNTER (OUTPATIENT)
Age: 55
End: 2025-05-30

## 2025-05-30 ENCOUNTER — OUTPATIENT (OUTPATIENT)
Dept: INPATIENT UNIT | Facility: HOSPITAL | Age: 55
LOS: 1 days | Discharge: HOME | End: 2025-05-30
Payer: MEDICAID

## 2025-05-30 ENCOUNTER — RESULT REVIEW (OUTPATIENT)
Age: 55
End: 2025-05-30

## 2025-05-30 VITALS
RESPIRATION RATE: 20 BRPM | SYSTOLIC BLOOD PRESSURE: 122 MMHG | DIASTOLIC BLOOD PRESSURE: 59 MMHG | HEART RATE: 90 BPM | OXYGEN SATURATION: 97 % | TEMPERATURE: 97 F

## 2025-05-30 VITALS
OXYGEN SATURATION: 96 % | HEART RATE: 90 BPM | HEIGHT: 66 IN | WEIGHT: 272.05 LBS | SYSTOLIC BLOOD PRESSURE: 132 MMHG | DIASTOLIC BLOOD PRESSURE: 74 MMHG | TEMPERATURE: 98 F | RESPIRATION RATE: 16 BRPM

## 2025-05-30 DIAGNOSIS — C83.30 DIFFUSE LARGE B-CELL LYMPHOMA, UNSPECIFIED SITE: ICD-10-CM

## 2025-05-30 DIAGNOSIS — Z90.49 ACQUIRED ABSENCE OF OTHER SPECIFIED PARTS OF DIGESTIVE TRACT: Chronic | ICD-10-CM

## 2025-05-30 DIAGNOSIS — Z98.89 OTHER SPECIFIED POSTPROCEDURAL STATES: Chronic | ICD-10-CM

## 2025-05-30 LAB
GLUCOSE BLDC GLUCOMTR-MCNC: 119 MG/DL — HIGH (ref 70–99)
HCT VFR BLD CALC: 28.3 % — LOW (ref 34.5–45)
HGB BLD-MCNC: 8.5 G/DL — LOW (ref 11.5–15.5)
MCHC RBC-ENTMCNC: 23.3 PG — LOW (ref 27–34)
MCHC RBC-ENTMCNC: 30 G/DL — LOW (ref 32–36)
MCV RBC AUTO: 77.5 FL — LOW (ref 80–100)
NRBC # BLD AUTO: 0.02 K/UL — HIGH (ref 0–0)
NRBC # FLD: 0.02 K/UL — HIGH (ref 0–0)
NRBC BLD AUTO-RTO: 1 /100 WBCS — HIGH (ref 0–0)
PLATELET # BLD AUTO: 402 K/UL — HIGH (ref 150–400)
PMV BLD: 10.6 FL — SIGNIFICANT CHANGE UP (ref 7–13)
RBC # BLD: 3.65 M/UL — LOW (ref 3.8–5.2)
RBC # FLD: 23.8 % — HIGH (ref 10.3–14.5)
WBC # BLD: 1.94 K/UL — LOW (ref 3.8–10.5)
WBC # FLD AUTO: 1.94 K/UL — LOW (ref 3.8–10.5)

## 2025-05-30 PROCEDURE — 76937 US GUIDE VASCULAR ACCESS: CPT

## 2025-05-30 PROCEDURE — C1769: CPT

## 2025-05-30 PROCEDURE — 85027 COMPLETE CBC AUTOMATED: CPT

## 2025-05-30 PROCEDURE — 77001 FLUOROGUIDE FOR VEIN DEVICE: CPT | Mod: 26

## 2025-05-30 PROCEDURE — 76937 US GUIDE VASCULAR ACCESS: CPT | Mod: 26

## 2025-05-30 PROCEDURE — 82962 GLUCOSE BLOOD TEST: CPT

## 2025-05-30 PROCEDURE — 36415 COLL VENOUS BLD VENIPUNCTURE: CPT

## 2025-05-30 PROCEDURE — C1894: CPT

## 2025-05-30 PROCEDURE — 36561 INSERT TUNNELED CV CATH: CPT | Mod: RT

## 2025-05-30 PROCEDURE — 36561 INSERT TUNNELED CV CATH: CPT

## 2025-05-30 PROCEDURE — C1788: CPT

## 2025-05-30 PROCEDURE — 77001 FLUOROGUIDE FOR VEIN DEVICE: CPT

## 2025-05-30 RX ORDER — FENTANYL CITRATE-0.9 % NACL/PF 100MCG/2ML
50 SYRINGE (ML) INTRAVENOUS
Refills: 0 | Status: DISCONTINUED | OUTPATIENT
Start: 2025-05-30 | End: 2025-05-30

## 2025-05-30 RX ORDER — OXYCODONE HYDROCHLORIDE 30 MG/1
5 TABLET ORAL ONCE
Refills: 0 | Status: DISCONTINUED | OUTPATIENT
Start: 2025-05-30 | End: 2025-05-30

## 2025-05-30 RX ORDER — ONDANSETRON HCL/PF 4 MG/2 ML
4 VIAL (ML) INJECTION ONCE
Refills: 0 | Status: DISCONTINUED | OUTPATIENT
Start: 2025-05-30 | End: 2025-05-30

## 2025-05-30 NOTE — ASU PATIENT PROFILE, ADULT - FALL HARM RISK - UNIVERSAL INTERVENTIONS
Bed in lowest position, wheels locked, appropriate side rails in place/Call bell, personal items and telephone in reach/Instruct patient to call for assistance before getting out of bed or chair/Non-slip footwear when patient is out of bed/[place] to call system/Physically safe environment - no spills, clutter or unnecessary equipment/Purposeful Proactive Rounding/Room/bathroom lighting operational, light cord in reach

## 2025-05-30 NOTE — ASU DISCHARGE PLAN (ADULT/PEDIATRIC) - FINANCIAL ASSISTANCE
[facility] provides services at a reduced cost to those who are determined to be eligible through [facility]’s financial assistance program. Information regarding [facility]’s financial assistance program can be found by going to [email or web address] or by calling [phone].

## 2025-05-30 NOTE — ASU DISCHARGE PLAN (ADULT/PEDIATRIC) - NS MD DC FALL RISK RISK
For information on Fall & Injury Prevention, visit: [email or web address] OR  [email or web address] OR  https://www.cdc.gov/steadi/patient.html

## 2025-05-31 ENCOUNTER — NON-APPOINTMENT (OUTPATIENT)
Age: 55
End: 2025-05-31

## 2025-06-01 LAB
% ALBUMIN: 56.9 % — SIGNIFICANT CHANGE UP
% ALPHA 1: 6.3 % — SIGNIFICANT CHANGE UP
% ALPHA 2: 13.5 % — SIGNIFICANT CHANGE UP
% BETA: 10.4 % — SIGNIFICANT CHANGE UP
% GAMMA: 12.9 % — SIGNIFICANT CHANGE UP
ALBUMIN SERPL ELPH-MCNC: 3.5 G/DL — LOW (ref 3.6–5.5)
ALBUMIN/GLOB SERPL ELPH: 1.3 RATIO — SIGNIFICANT CHANGE UP
ALPHA1 GLOB SERPL ELPH-MCNC: 0.4 G/DL — SIGNIFICANT CHANGE UP (ref 0.1–0.4)
ALPHA2 GLOB SERPL ELPH-MCNC: 0.8 G/DL — SIGNIFICANT CHANGE UP (ref 0.5–1)
B-GLOBULIN SERPL ELPH-MCNC: 0.6 G/DL — SIGNIFICANT CHANGE UP (ref 0.5–1)
GAMMA GLOBULIN: 0.8 G/DL — SIGNIFICANT CHANGE UP (ref 0.6–1.6)
INTERPRETATION SERPL IFE-IMP: SIGNIFICANT CHANGE UP
PROT PATTERN SERPL ELPH-IMP: SIGNIFICANT CHANGE UP
PROT SERPL-MCNC: 6.2 G/DL — SIGNIFICANT CHANGE UP (ref 6–8.3)
PROT SERPL-MCNC: 6.2 G/DL — SIGNIFICANT CHANGE UP (ref 6–8.3)

## 2025-06-02 ENCOUNTER — RESULT REVIEW (OUTPATIENT)
Age: 55
End: 2025-06-02

## 2025-06-02 ENCOUNTER — APPOINTMENT (OUTPATIENT)
Dept: HEMATOLOGY ONCOLOGY | Facility: CLINIC | Age: 55
End: 2025-06-02

## 2025-06-02 LAB
ANISOCYTOSIS BLD QL: ABNORMAL
BASOPHILS # BLD AUTO: 0.05 K/UL — SIGNIFICANT CHANGE UP (ref 0–0.2)
BASOPHILS # BLD MANUAL: 0.02 K/UL — SIGNIFICANT CHANGE UP (ref 0–0.2)
BASOPHILS NFR BLD AUTO: 2.6 % — HIGH (ref 0–2)
BASOPHILS NFR BLD MANUAL: 1 % — SIGNIFICANT CHANGE UP (ref 0–2)
EOSINOPHIL # BLD AUTO: 0.2 K/UL — SIGNIFICANT CHANGE UP (ref 0–0.5)
EOSINOPHIL # BLD MANUAL: 0.35 K/UL — SIGNIFICANT CHANGE UP (ref 0–0.5)
EOSINOPHIL NFR BLD AUTO: 10.4 % — HIGH (ref 0–6)
EOSINOPHIL NFR BLD MANUAL: 18 % — HIGH (ref 0–6)
GIANT PLATELETS BLD QL SMEAR: PRESENT
HCT VFR BLD CALC: 26.7 % — LOW (ref 34.5–45)
HGB BLD-MCNC: 8.1 G/DL — LOW (ref 11.5–15.5)
IMM GRANULOCYTES # BLD AUTO: 0.29 K/UL — HIGH (ref 0–0.07)
IMM GRANULOCYTES NFR BLD AUTO: 15.1 % — HIGH (ref 0–0.9)
LG PLATELETS BLD QL AUTO: SLIGHT — SIGNIFICANT CHANGE UP
LYMPHOCYTES # BLD AUTO: 0.51 K/UL — LOW (ref 1–3.3)
LYMPHOCYTES # BLD MANUAL: 0.73 K/UL — LOW (ref 1–3.3)
LYMPHOCYTES NFR BLD AUTO: 26.6 % — SIGNIFICANT CHANGE UP (ref 13–44)
LYMPHOCYTES NFR BLD MANUAL: 38 % — SIGNIFICANT CHANGE UP (ref 13–44)
MACROCYTES BLD QL: SLIGHT — SIGNIFICANT CHANGE UP
MANUAL METAMYELOCYTE #: 0.15 K/UL — HIGH (ref 0–0)
MANUAL MYELOCYTE #: 0.1 K/UL — HIGH (ref 0–0)
MANUAL NEUTROPHIL BANDS #: 0.04 K/UL — SIGNIFICANT CHANGE UP (ref 0–0.84)
MANUAL NRBC #: 0.15 K/UL — HIGH (ref 0–0)
MANUAL PROMYELOCYTE #: 0.04 K/UL — HIGH (ref 0–0)
MANUAL SMEAR VERIFICATION: SIGNIFICANT CHANGE UP
MCHC RBC-ENTMCNC: 23.5 PG — LOW (ref 27–34)
MCHC RBC-ENTMCNC: 30.3 G/DL — LOW (ref 32–36)
MCV RBC AUTO: 77.6 FL — LOW (ref 80–100)
METAMYELOCYTES # FLD: 8 % — HIGH (ref 0–0)
METAMYELOCYTES NFR BLD: 8 % — HIGH (ref 0–0)
MICROCYTES BLD QL: ABNORMAL
MONOCYTES # BLD AUTO: 0.35 K/UL — SIGNIFICANT CHANGE UP (ref 0–0.9)
MONOCYTES # BLD MANUAL: 0.17 K/UL — SIGNIFICANT CHANGE UP (ref 0–0.9)
MONOCYTES NFR BLD AUTO: 18.2 % — HIGH (ref 2–14)
MONOCYTES NFR BLD MANUAL: 9 % — SIGNIFICANT CHANGE UP (ref 2–14)
MYELOCYTES NFR BLD: 5 % — HIGH (ref 0–0)
NEUTROPHILS # BLD AUTO: 0.52 K/UL — LOW (ref 1.8–7.4)
NEUTROPHILS # BLD MANUAL: 0.33 K/UL — LOW (ref 1.8–7.4)
NEUTROPHILS NFR BLD AUTO: 27.1 % — LOW (ref 43–77)
NEUTROPHILS NFR BLD MANUAL: 17 % — LOW (ref 43–77)
NEUTS BAND # BLD: 2 % — SIGNIFICANT CHANGE UP (ref 0–8)
NEUTS BAND NFR BLD: 2 % — SIGNIFICANT CHANGE UP (ref 0–8)
NRBC # BLD AUTO: 0.12 K/UL — HIGH (ref 0–0)
NRBC # BLD: 8 /100 WBCS — HIGH (ref 0–0)
NRBC # FLD: 0.12 K/UL — HIGH (ref 0–0)
NRBC BLD AUTO-RTO: 6 /100 WBCS — HIGH (ref 0–0)
NRBC BLD-RTO: 8 /100 WBCS — HIGH (ref 0–0)
PLAT MORPH BLD: NORMAL — SIGNIFICANT CHANGE UP
PLATELET # BLD AUTO: 325 K/UL — SIGNIFICANT CHANGE UP (ref 150–400)
PMV BLD: 10 FL — SIGNIFICANT CHANGE UP (ref 7–13)
POLYCHROMASIA BLD QL SMEAR: ABNORMAL
PROMYELOCYTES # FLD: 2 % — HIGH (ref 0–0)
PROMYELOCYTES NFR BLD: 2 % — HIGH (ref 0–0)
RBC # BLD: 3.44 M/UL — LOW (ref 3.8–5.2)
RBC # FLD: 26.3 % — HIGH (ref 10.3–14.5)
RBC BLD AUTO: ABNORMAL
WBC # BLD: 1.92 K/UL — LOW (ref 3.8–10.5)
WBC # FLD AUTO: 1.92 K/UL — LOW (ref 3.8–10.5)

## 2025-06-04 ENCOUNTER — APPOINTMENT (OUTPATIENT)
Dept: GERIATRICS | Facility: CLINIC | Age: 55
End: 2025-06-04
Payer: MEDICAID

## 2025-06-04 VITALS
OXYGEN SATURATION: 96 % | HEART RATE: 91 BPM | RESPIRATION RATE: 16 BRPM | SYSTOLIC BLOOD PRESSURE: 114 MMHG | BODY MASS INDEX: 42.43 KG/M2 | DIASTOLIC BLOOD PRESSURE: 75 MMHG | WEIGHT: 264 LBS | HEIGHT: 66 IN | TEMPERATURE: 98.8 F

## 2025-06-04 DIAGNOSIS — G89.3 NEOPLASM RELATED PAIN (ACUTE) (CHRONIC): ICD-10-CM

## 2025-06-04 DIAGNOSIS — K59.00 CONSTIPATION, UNSPECIFIED: ICD-10-CM

## 2025-06-04 DIAGNOSIS — Z51.5 ENCOUNTER FOR PALLIATIVE CARE: ICD-10-CM

## 2025-06-04 PROBLEM — F41.9 ANXIETY AND DEPRESSION: Status: ACTIVE | Noted: 2025-06-04

## 2025-06-04 PROCEDURE — 99205 OFFICE O/P NEW HI 60 MIN: CPT

## 2025-06-04 RX ORDER — METHOCARBAMOL 500 MG/1
500 TABLET, FILM COATED ORAL
Qty: 60 | Refills: 1 | Status: ACTIVE | COMMUNITY
Start: 2025-06-04 | End: 1900-01-01

## 2025-06-04 RX ORDER — ALPRAZOLAM 0.5 MG/1
0.5 TABLET ORAL
Qty: 30 | Refills: 0 | Status: ACTIVE | COMMUNITY
Start: 2025-06-04 | End: 1900-01-01

## 2025-06-06 ENCOUNTER — OUTPATIENT (OUTPATIENT)
Dept: OUTPATIENT SERVICES | Facility: HOSPITAL | Age: 55
LOS: 1 days | End: 2025-06-06
Payer: COMMERCIAL

## 2025-06-06 ENCOUNTER — RESULT REVIEW (OUTPATIENT)
Age: 55
End: 2025-06-06

## 2025-06-06 ENCOUNTER — APPOINTMENT (OUTPATIENT)
Age: 55
End: 2025-06-06

## 2025-06-06 DIAGNOSIS — Z98.89 OTHER SPECIFIED POSTPROCEDURAL STATES: Chronic | ICD-10-CM

## 2025-06-06 DIAGNOSIS — C83.30 DIFFUSE LARGE B-CELL LYMPHOMA, UNSPECIFIED SITE: ICD-10-CM

## 2025-06-06 LAB
ANISOCYTOSIS BLD QL: ABNORMAL
BASOPHILS # BLD AUTO: 0.07 K/UL — SIGNIFICANT CHANGE UP (ref 0–0.2)
BASOPHILS # BLD MANUAL: 0 K/UL — SIGNIFICANT CHANGE UP (ref 0–0.2)
BASOPHILS NFR BLD AUTO: 0.8 % — SIGNIFICANT CHANGE UP (ref 0–2)
BASOPHILS NFR BLD MANUAL: 0 % — SIGNIFICANT CHANGE UP (ref 0–2)
BLD GP AB SCN SERPL QL: SIGNIFICANT CHANGE UP
EOSINOPHIL # BLD AUTO: 0.18 K/UL — SIGNIFICANT CHANGE UP (ref 0–0.5)
EOSINOPHIL # BLD MANUAL: 0.09 K/UL — SIGNIFICANT CHANGE UP (ref 0–0.5)
EOSINOPHIL NFR BLD AUTO: 1.9 % — SIGNIFICANT CHANGE UP (ref 0–6)
EOSINOPHIL NFR BLD MANUAL: 1 % — SIGNIFICANT CHANGE UP (ref 0–6)
HCT VFR BLD CALC: 24.3 % — LOW (ref 34.5–45)
HGB BLD-MCNC: 7.3 G/DL — LOW (ref 11.5–15.5)
HYPOCHROMIA BLD QL: SLIGHT — SIGNIFICANT CHANGE UP
IMM GRANULOCYTES # BLD AUTO: 0.59 K/UL — HIGH (ref 0–0.07)
IMM GRANULOCYTES NFR BLD AUTO: 6.4 % — HIGH (ref 0–0.9)
LYMPHOCYTES # BLD AUTO: 0.64 K/UL — LOW (ref 1–3.3)
LYMPHOCYTES # BLD MANUAL: 0.65 K/UL — LOW (ref 1–3.3)
LYMPHOCYTES NFR BLD AUTO: 6.9 % — LOW (ref 13–44)
LYMPHOCYTES NFR BLD MANUAL: 7 % — LOW (ref 13–44)
MACROCYTES BLD QL: SLIGHT — SIGNIFICANT CHANGE UP
MANUAL METAMYELOCYTE #: 0.28 K/UL — HIGH (ref 0–0)
MANUAL MYELOCYTE #: 0.55 K/UL — HIGH (ref 0–0)
MANUAL NEUTROPHIL BANDS #: 0.55 K/UL — SIGNIFICANT CHANGE UP (ref 0–0.84)
MANUAL NRBC #: 0.19 K/UL — HIGH (ref 0–0)
MANUAL REACTIVE LYMPHOCYTES #: 0.28 K/UL — SIGNIFICANT CHANGE UP (ref 0–0.63)
MANUAL SMEAR VERIFICATION: SIGNIFICANT CHANGE UP
MCHC RBC-ENTMCNC: 23.8 PG — LOW (ref 27–34)
MCHC RBC-ENTMCNC: 30 G/DL — LOW (ref 32–36)
MCV RBC AUTO: 79.2 FL — LOW (ref 80–100)
METAMYELOCYTES # FLD: 3 % — HIGH (ref 0–0)
METAMYELOCYTES NFR BLD: 3 % — HIGH (ref 0–0)
MICROCYTES BLD QL: ABNORMAL
MONOCYTES # BLD AUTO: 0.51 K/UL — SIGNIFICANT CHANGE UP (ref 0–0.9)
MONOCYTES # BLD MANUAL: 0.28 K/UL — SIGNIFICANT CHANGE UP (ref 0–0.9)
MONOCYTES NFR BLD AUTO: 5.5 % — SIGNIFICANT CHANGE UP (ref 2–14)
MONOCYTES NFR BLD MANUAL: 3 % — SIGNIFICANT CHANGE UP (ref 2–14)
MYELOCYTES NFR BLD: 6 % — HIGH (ref 0–0)
NEUTROPHILS # BLD AUTO: 7.26 K/UL — SIGNIFICANT CHANGE UP (ref 1.8–7.4)
NEUTROPHILS # BLD MANUAL: 6.57 K/UL — SIGNIFICANT CHANGE UP (ref 1.8–7.4)
NEUTROPHILS NFR BLD AUTO: 78.5 % — HIGH (ref 43–77)
NEUTROPHILS NFR BLD MANUAL: 71 % — SIGNIFICANT CHANGE UP (ref 43–77)
NEUTS BAND # BLD: 6 % — SIGNIFICANT CHANGE UP (ref 0–8)
NEUTS BAND NFR BLD: 6 % — SIGNIFICANT CHANGE UP (ref 0–8)
NRBC # BLD AUTO: 0.12 K/UL — HIGH (ref 0–0)
NRBC # BLD: 2 /100 WBCS — HIGH (ref 0–0)
NRBC # FLD: 0.12 K/UL — HIGH (ref 0–0)
NRBC BLD AUTO-RTO: 1 /100 WBCS — HIGH (ref 0–0)
NRBC BLD-RTO: 2 /100 WBCS — HIGH (ref 0–0)
PLAT MORPH BLD: NORMAL — SIGNIFICANT CHANGE UP
PLATELET # BLD AUTO: 245 K/UL — SIGNIFICANT CHANGE UP (ref 150–400)
PMV BLD: 10.2 FL — SIGNIFICANT CHANGE UP (ref 7–13)
POLYCHROMASIA BLD QL SMEAR: ABNORMAL
RBC # BLD: 3.07 M/UL — LOW (ref 3.8–5.2)
RBC # FLD: 27.2 % — HIGH (ref 10.3–14.5)
RBC BLD AUTO: ABNORMAL
SPHEROCYTES BLD QL SMEAR: SLIGHT — SIGNIFICANT CHANGE UP
TOXIC GRANULES BLD QL SMEAR: PRESENT
VARIANT LYMPHS # BLD: 3 % — SIGNIFICANT CHANGE UP (ref 0–6)
VARIANT LYMPHS NFR BLD MANUAL: 3 % — SIGNIFICANT CHANGE UP (ref 0–6)
WBC # BLD: 9.25 K/UL — SIGNIFICANT CHANGE UP (ref 3.8–10.5)
WBC # FLD AUTO: 9.25 K/UL — SIGNIFICANT CHANGE UP (ref 3.8–10.5)

## 2025-06-06 RX ORDER — AMLODIPINE BESYLATE 10 MG/1
1 TABLET ORAL
Qty: 0 | Refills: 0 | DISCHARGE

## 2025-06-06 RX ORDER — PREDNISONE 50 MG/1
50 TABLET ORAL DAILY
Qty: 40 | Refills: 0 | Status: ACTIVE | COMMUNITY
Start: 2025-06-06 | End: 1900-01-01

## 2025-06-06 RX ORDER — METOCLOPRAMIDE 10 MG/1
10 TABLET ORAL
Qty: 120 | Refills: 2 | Status: ACTIVE | COMMUNITY
Start: 2025-06-06 | End: 1900-01-01

## 2025-06-09 ENCOUNTER — RESULT REVIEW (OUTPATIENT)
Age: 55
End: 2025-06-09

## 2025-06-09 ENCOUNTER — APPOINTMENT (OUTPATIENT)
Dept: HEMATOLOGY ONCOLOGY | Facility: CLINIC | Age: 55
End: 2025-06-09
Payer: MEDICAID

## 2025-06-09 ENCOUNTER — APPOINTMENT (OUTPATIENT)
Age: 55
End: 2025-06-09

## 2025-06-09 DIAGNOSIS — Z51.11 ENCOUNTER FOR ANTINEOPLASTIC CHEMOTHERAPY: ICD-10-CM

## 2025-06-09 DIAGNOSIS — Z51.89 ENCOUNTER FOR OTHER SPECIFIED AFTERCARE: ICD-10-CM

## 2025-06-09 DIAGNOSIS — R11.2 NAUSEA WITH VOMITING, UNSPECIFIED: ICD-10-CM

## 2025-06-09 DIAGNOSIS — C83.30 DIFFUSE LARGE B-CELL LYMPHOMA, UNSPECIFIED SITE: ICD-10-CM

## 2025-06-09 PROBLEM — E11.9 DIABETES MELLITUS: Status: ACTIVE | Noted: 2024-12-23

## 2025-06-09 LAB
ANISOCYTOSIS BLD QL: SLIGHT — SIGNIFICANT CHANGE UP
BASO STIPL BLD QL SMEAR: PRESENT
BASOPHILS # BLD AUTO: 0.04 K/UL — SIGNIFICANT CHANGE UP (ref 0–0.2)
BASOPHILS # BLD MANUAL: 0.53 K/UL — HIGH (ref 0–0.2)
BASOPHILS NFR BLD AUTO: 0.1 % — SIGNIFICANT CHANGE UP (ref 0–2)
BASOPHILS NFR BLD MANUAL: 1 % — SIGNIFICANT CHANGE UP (ref 0–2)
ELLIPTOCYTES BLD QL SMEAR: SLIGHT — SIGNIFICANT CHANGE UP
EOSINOPHIL # BLD AUTO: 0.03 K/UL — SIGNIFICANT CHANGE UP (ref 0–0.5)
EOSINOPHIL # BLD MANUAL: 0 K/UL — SIGNIFICANT CHANGE UP (ref 0–0.5)
EOSINOPHIL NFR BLD AUTO: 0.1 % — SIGNIFICANT CHANGE UP (ref 0–6)
EOSINOPHIL NFR BLD MANUAL: 0 % — SIGNIFICANT CHANGE UP (ref 0–6)
HCT VFR BLD CALC: 23.2 % — LOW (ref 34.5–45)
HGB BLD-MCNC: 7.1 G/DL — LOW (ref 11.5–15.5)
HYPOCHROMIA BLD QL: SLIGHT — SIGNIFICANT CHANGE UP
IMM GRANULOCYTES # BLD AUTO: 7.93 K/UL — HIGH (ref 0–0.07)
IMM GRANULOCYTES NFR BLD AUTO: 14.9 % — HIGH (ref 0–0.9)
LG PLATELETS BLD QL AUTO: SLIGHT — SIGNIFICANT CHANGE UP
LYMPHOCYTES # BLD AUTO: 0.81 K/UL — LOW (ref 1–3.3)
LYMPHOCYTES # BLD MANUAL: 1.06 K/UL — SIGNIFICANT CHANGE UP (ref 1–3.3)
LYMPHOCYTES NFR BLD AUTO: 1.5 % — LOW (ref 13–44)
LYMPHOCYTES NFR BLD MANUAL: 2 % — LOW (ref 13–44)
MACROCYTES BLD QL: SLIGHT — SIGNIFICANT CHANGE UP
MANUAL NEUTROPHIL BANDS #: 0.53 K/UL — SIGNIFICANT CHANGE UP (ref 0–0.84)
MANUAL SMEAR VERIFICATION: SIGNIFICANT CHANGE UP
MCHC RBC-ENTMCNC: 24.7 PG — LOW (ref 27–34)
MCHC RBC-ENTMCNC: 30.6 G/DL — LOW (ref 32–36)
MCV RBC AUTO: 80.8 FL — SIGNIFICANT CHANGE UP (ref 80–100)
MICROCYTES BLD QL: SLIGHT — SIGNIFICANT CHANGE UP
MONOCYTES # BLD AUTO: 0.45 K/UL — SIGNIFICANT CHANGE UP (ref 0–0.9)
MONOCYTES # BLD MANUAL: 1.06 K/UL — HIGH (ref 0–0.9)
MONOCYTES NFR BLD AUTO: 0.8 % — LOW (ref 2–14)
MONOCYTES NFR BLD MANUAL: 2 % — SIGNIFICANT CHANGE UP (ref 2–14)
NEUTROPHILS # BLD AUTO: 43.89 K/UL — HIGH (ref 1.8–7.4)
NEUTROPHILS # BLD MANUAL: 49.98 K/UL — HIGH (ref 1.8–7.4)
NEUTROPHILS NFR BLD AUTO: 82.6 % — HIGH (ref 43–77)
NEUTROPHILS NFR BLD MANUAL: 94 % — HIGH (ref 43–77)
NEUTS BAND # BLD: 1 % — SIGNIFICANT CHANGE UP (ref 0–8)
NEUTS BAND NFR BLD: 1 % — SIGNIFICANT CHANGE UP (ref 0–8)
NRBC # BLD AUTO: 0.02 K/UL — HIGH (ref 0–0)
NRBC # FLD: 0.02 K/UL — HIGH (ref 0–0)
NRBC BLD AUTO-RTO: 0 /100 WBCS — SIGNIFICANT CHANGE UP (ref 0–0)
OVALOCYTES BLD QL SMEAR: SLIGHT — SIGNIFICANT CHANGE UP
PLAT MORPH BLD: NORMAL — SIGNIFICANT CHANGE UP
PLATELET # BLD AUTO: 240 K/UL — SIGNIFICANT CHANGE UP (ref 150–400)
PMV BLD: 10.3 FL — SIGNIFICANT CHANGE UP (ref 7–13)
POLYCHROMASIA BLD QL SMEAR: SLIGHT — SIGNIFICANT CHANGE UP
RBC # BLD: 2.87 M/UL — LOW (ref 3.8–5.2)
RBC # FLD: 26.7 % — HIGH (ref 10.3–14.5)
RBC BLD AUTO: SIGNIFICANT CHANGE UP
STOMATOCYTES BLD QL SMEAR: SLIGHT — SIGNIFICANT CHANGE UP
WBC # BLD: 53.17 K/UL — CRITICAL HIGH (ref 3.8–10.5)
WBC # FLD AUTO: 53.17 K/UL — CRITICAL HIGH (ref 3.8–10.5)

## 2025-06-09 PROCEDURE — 99215 OFFICE O/P EST HI 40 MIN: CPT

## 2025-06-09 PROCEDURE — 86900 BLOOD TYPING SEROLOGIC ABO: CPT

## 2025-06-09 PROCEDURE — 86923 COMPATIBILITY TEST ELECTRIC: CPT

## 2025-06-09 PROCEDURE — 86901 BLOOD TYPING SEROLOGIC RH(D): CPT

## 2025-06-09 PROCEDURE — 86850 RBC ANTIBODY SCREEN: CPT

## 2025-06-09 PROCEDURE — P9040: CPT

## 2025-06-09 PROCEDURE — 36415 COLL VENOUS BLD VENIPUNCTURE: CPT

## 2025-06-09 RX ORDER — SIMETHICONE 80 MG/1
80 TABLET, CHEWABLE ORAL EVERY 8 HOURS
Qty: 90 | Refills: 0 | Status: ACTIVE | COMMUNITY
Start: 2025-06-09 | End: 1900-01-01

## 2025-06-10 ENCOUNTER — NON-APPOINTMENT (OUTPATIENT)
Age: 55
End: 2025-06-10

## 2025-06-10 ENCOUNTER — APPOINTMENT (OUTPATIENT)
Dept: PSYCHIATRY | Facility: CLINIC | Age: 55
End: 2025-06-10

## 2025-06-10 LAB
ALBUMIN SERPL ELPH-MCNC: 3.1 G/DL — LOW (ref 3.3–5)
ALP SERPL-CCNC: 251 U/L — HIGH (ref 40–120)
ALT FLD-CCNC: 22 U/L — SIGNIFICANT CHANGE UP (ref 10–40)
ANION GAP SERPL CALC-SCNC: 14 MMOL/L — SIGNIFICANT CHANGE UP (ref 5–17)
AST SERPL-CCNC: 27 U/L — SIGNIFICANT CHANGE UP (ref 10–35)
BILIRUB SERPL-MCNC: 1.2 MG/DL — SIGNIFICANT CHANGE UP (ref 0.2–1.2)
BUN SERPL-MCNC: 16 MG/DL — SIGNIFICANT CHANGE UP (ref 7–23)
CALCIUM SERPL-MCNC: 9.2 MG/DL — SIGNIFICANT CHANGE UP (ref 8.4–10.5)
CHLORIDE SERPL-SCNC: 100 MMOL/L — SIGNIFICANT CHANGE UP (ref 96–108)
CO2 SERPL-SCNC: 25 MMOL/L — SIGNIFICANT CHANGE UP (ref 22–31)
CREAT SERPL-MCNC: 0.92 MG/DL — SIGNIFICANT CHANGE UP (ref 0.5–1.3)
EGFR: 74 ML/MIN/1.73M2 — SIGNIFICANT CHANGE UP
EGFR: 74 ML/MIN/1.73M2 — SIGNIFICANT CHANGE UP
GLUCOSE SERPL-MCNC: 94 MG/DL — SIGNIFICANT CHANGE UP (ref 70–99)
HBV CORE AB SER-ACNC: SIGNIFICANT CHANGE UP
HBV SURFACE AB SER-ACNC: ABNORMAL
HBV SURFACE AG SER-ACNC: SIGNIFICANT CHANGE UP
POTASSIUM SERPL-MCNC: 4.1 MMOL/L — SIGNIFICANT CHANGE UP (ref 3.5–5.3)
POTASSIUM SERPL-SCNC: 4.1 MMOL/L — SIGNIFICANT CHANGE UP (ref 3.5–5.3)
PROT SERPL-MCNC: 6.2 G/DL — SIGNIFICANT CHANGE UP (ref 6–8.3)
SODIUM SERPL-SCNC: 138 MMOL/L — SIGNIFICANT CHANGE UP (ref 135–145)

## 2025-06-10 PROCEDURE — 90791 PSYCH DIAGNOSTIC EVALUATION: CPT | Mod: 95

## 2025-06-11 ENCOUNTER — OUTPATIENT (OUTPATIENT)
Dept: OUTPATIENT SERVICES | Facility: HOSPITAL | Age: 55
LOS: 1 days | Discharge: HOME | End: 2025-06-11

## 2025-06-11 DIAGNOSIS — C83.30 DIFFUSE LARGE B-CELL LYMPHOMA, UNSPECIFIED SITE: ICD-10-CM

## 2025-06-11 DIAGNOSIS — D64.9 ANEMIA, UNSPECIFIED: ICD-10-CM

## 2025-06-11 DIAGNOSIS — Z51.11 ENCOUNTER FOR ANTINEOPLASTIC CHEMOTHERAPY: ICD-10-CM

## 2025-06-11 DIAGNOSIS — R11.2 NAUSEA WITH VOMITING, UNSPECIFIED: ICD-10-CM

## 2025-06-11 DIAGNOSIS — Z98.89 OTHER SPECIFIED POSTPROCEDURAL STATES: Chronic | ICD-10-CM

## 2025-06-13 RX ORDER — OXYCODONE HYDROCHLORIDE 30 MG/1
30 TABLET, FILM COATED, EXTENDED RELEASE ORAL
Qty: 60 | Refills: 0 | Status: ACTIVE | COMMUNITY
Start: 2025-06-13 | End: 1900-01-01

## 2025-06-13 RX ORDER — HYDROMORPHONE HYDROCHLORIDE 4 MG/1
4 TABLET ORAL
Qty: 90 | Refills: 0 | Status: ACTIVE | COMMUNITY
Start: 2025-06-13 | End: 1900-01-01

## 2025-06-16 ENCOUNTER — OUTPATIENT (OUTPATIENT)
Dept: OUTPATIENT SERVICES | Facility: HOSPITAL | Age: 55
LOS: 1 days | End: 2025-06-16
Payer: COMMERCIAL

## 2025-06-16 ENCOUNTER — APPOINTMENT (OUTPATIENT)
Age: 55
End: 2025-06-16

## 2025-06-16 ENCOUNTER — RESULT REVIEW (OUTPATIENT)
Age: 55
End: 2025-06-16

## 2025-06-16 DIAGNOSIS — C83.30 DIFFUSE LARGE B-CELL LYMPHOMA, UNSPECIFIED SITE: ICD-10-CM

## 2025-06-16 DIAGNOSIS — Z98.89 OTHER SPECIFIED POSTPROCEDURAL STATES: Chronic | ICD-10-CM

## 2025-06-16 LAB
ANISOCYTOSIS BLD QL: SLIGHT — SIGNIFICANT CHANGE UP
BASO STIPL BLD QL SMEAR: PRESENT
BASOPHILS # BLD AUTO: 0.02 K/UL — SIGNIFICANT CHANGE UP (ref 0–0.2)
BASOPHILS # BLD MANUAL: 0.1 K/UL — SIGNIFICANT CHANGE UP (ref 0–0.2)
BASOPHILS NFR BLD AUTO: 0.2 % — SIGNIFICANT CHANGE UP (ref 0–2)
BASOPHILS NFR BLD MANUAL: 1 % — SIGNIFICANT CHANGE UP (ref 0–2)
BLD GP AB SCN SERPL QL: SIGNIFICANT CHANGE UP
DACRYOCYTES BLD QL SMEAR: SLIGHT — SIGNIFICANT CHANGE UP
ELLIPTOCYTES BLD QL SMEAR: SLIGHT — SIGNIFICANT CHANGE UP
EOSINOPHIL # BLD AUTO: 0.05 K/UL — SIGNIFICANT CHANGE UP (ref 0–0.5)
EOSINOPHIL # BLD MANUAL: 0 K/UL — SIGNIFICANT CHANGE UP (ref 0–0.5)
EOSINOPHIL NFR BLD AUTO: 0.5 % — SIGNIFICANT CHANGE UP (ref 0–6)
EOSINOPHIL NFR BLD MANUAL: 0 % — SIGNIFICANT CHANGE UP (ref 0–6)
HCT VFR BLD CALC: 34.5 % — SIGNIFICANT CHANGE UP (ref 34.5–45)
HGB BLD-MCNC: 10.4 G/DL — LOW (ref 11.5–15.5)
HYPOCHROMIA BLD QL: SLIGHT — SIGNIFICANT CHANGE UP
IMM GRANULOCYTES # BLD AUTO: 1.67 K/UL — HIGH (ref 0–0.07)
IMM GRANULOCYTES NFR BLD AUTO: 16.2 % — HIGH (ref 0–0.9)
LG PLATELETS BLD QL AUTO: SLIGHT — SIGNIFICANT CHANGE UP
LYMPHOCYTES # BLD AUTO: 0.7 K/UL — LOW (ref 1–3.3)
LYMPHOCYTES # BLD MANUAL: 1.23 K/UL — SIGNIFICANT CHANGE UP (ref 1–3.3)
LYMPHOCYTES NFR BLD AUTO: 6.8 % — LOW (ref 13–44)
LYMPHOCYTES NFR BLD MANUAL: 12 % — LOW (ref 13–44)
MACROCYTES BLD QL: SLIGHT — SIGNIFICANT CHANGE UP
MANUAL METAMYELOCYTE #: 0.41 K/UL — HIGH (ref 0–0)
MANUAL MYELOCYTE #: 0.21 K/UL — HIGH (ref 0–0)
MANUAL NEUTROPHIL BANDS #: 0.72 K/UL — SIGNIFICANT CHANGE UP (ref 0–0.84)
MANUAL NRBC #: 0.51 K/UL — HIGH (ref 0–0)
MANUAL PROMYELOCYTE #: 0.51 K/UL — HIGH (ref 0–0)
MANUAL SMEAR VERIFICATION: SIGNIFICANT CHANGE UP
MCHC RBC-ENTMCNC: 25.6 PG — LOW (ref 27–34)
MCHC RBC-ENTMCNC: 30.1 G/DL — LOW (ref 32–36)
MCV RBC AUTO: 85 FL — SIGNIFICANT CHANGE UP (ref 80–100)
METAMYELOCYTES # FLD: 4 % — HIGH (ref 0–0)
METAMYELOCYTES NFR BLD: 4 % — HIGH (ref 0–0)
MICROCYTES BLD QL: SLIGHT — SIGNIFICANT CHANGE UP
MONOCYTES # BLD AUTO: 0.83 K/UL — SIGNIFICANT CHANGE UP (ref 0–0.9)
MONOCYTES # BLD MANUAL: 0.41 K/UL — SIGNIFICANT CHANGE UP (ref 0–0.9)
MONOCYTES NFR BLD AUTO: 8.1 % — SIGNIFICANT CHANGE UP (ref 2–14)
MONOCYTES NFR BLD MANUAL: 4 % — SIGNIFICANT CHANGE UP (ref 2–14)
MYELOCYTES NFR BLD: 2 % — HIGH (ref 0–0)
NEUTROPHILS # BLD AUTO: 7.03 K/UL — SIGNIFICANT CHANGE UP (ref 1.8–7.4)
NEUTROPHILS # BLD MANUAL: 6.68 K/UL — SIGNIFICANT CHANGE UP (ref 1.8–7.4)
NEUTROPHILS NFR BLD AUTO: 68.2 % — SIGNIFICANT CHANGE UP (ref 43–77)
NEUTROPHILS NFR BLD MANUAL: 65 % — SIGNIFICANT CHANGE UP (ref 43–77)
NEUTS BAND # BLD: 7 % — SIGNIFICANT CHANGE UP (ref 0–8)
NEUTS BAND NFR BLD: 7 % — SIGNIFICANT CHANGE UP (ref 0–8)
NRBC # BLD AUTO: 0.49 K/UL — HIGH (ref 0–0)
NRBC # BLD: 5 /100 WBCS — HIGH (ref 0–0)
NRBC # FLD: 0.49 K/UL — HIGH (ref 0–0)
NRBC BLD AUTO-RTO: 5 /100 WBCS — HIGH (ref 0–0)
NRBC BLD-RTO: 5 /100 WBCS — HIGH (ref 0–0)
OVALOCYTES BLD QL SMEAR: SLIGHT — SIGNIFICANT CHANGE UP
PLAT MORPH BLD: NORMAL — SIGNIFICANT CHANGE UP
PLATELET # BLD AUTO: 219 K/UL — SIGNIFICANT CHANGE UP (ref 150–400)
PMV BLD: 10 FL — SIGNIFICANT CHANGE UP (ref 7–13)
POLYCHROMASIA BLD QL SMEAR: SLIGHT — SIGNIFICANT CHANGE UP
PROMYELOCYTES # FLD: 5 % — HIGH (ref 0–0)
PROMYELOCYTES NFR BLD: 5 % — HIGH (ref 0–0)
RBC # BLD: 4.06 M/UL — SIGNIFICANT CHANGE UP (ref 3.8–5.2)
RBC # FLD: 27.8 % — HIGH (ref 10.3–14.5)
RBC BLD AUTO: SIGNIFICANT CHANGE UP
STOMATOCYTES BLD QL SMEAR: SLIGHT — SIGNIFICANT CHANGE UP
WBC # BLD: 10.28 K/UL — SIGNIFICANT CHANGE UP (ref 3.8–10.5)
WBC # FLD AUTO: 10.28 K/UL — SIGNIFICANT CHANGE UP (ref 3.8–10.5)

## 2025-06-16 PROCEDURE — 86900 BLOOD TYPING SEROLOGIC ABO: CPT

## 2025-06-16 PROCEDURE — 86901 BLOOD TYPING SEROLOGIC RH(D): CPT

## 2025-06-16 PROCEDURE — 36415 COLL VENOUS BLD VENIPUNCTURE: CPT

## 2025-06-16 PROCEDURE — 86850 RBC ANTIBODY SCREEN: CPT

## 2025-06-19 ENCOUNTER — NON-APPOINTMENT (OUTPATIENT)
Age: 55
End: 2025-06-19

## 2025-06-19 ENCOUNTER — APPOINTMENT (OUTPATIENT)
Dept: GASTROENTEROLOGY | Facility: CLINIC | Age: 55
End: 2025-06-19

## 2025-06-23 ENCOUNTER — RESULT REVIEW (OUTPATIENT)
Age: 55
End: 2025-06-23

## 2025-06-23 ENCOUNTER — APPOINTMENT (OUTPATIENT)
Age: 55
End: 2025-06-23

## 2025-06-23 LAB
ANISOCYTOSIS BLD QL: SLIGHT — SIGNIFICANT CHANGE UP
BASOPHILS # BLD AUTO: 0.04 K/UL — SIGNIFICANT CHANGE UP (ref 0–0.2)
BASOPHILS # BLD MANUAL: 0 K/UL — SIGNIFICANT CHANGE UP (ref 0–0.2)
BASOPHILS NFR BLD AUTO: 0.3 % — SIGNIFICANT CHANGE UP (ref 0–2)
BASOPHILS NFR BLD MANUAL: 0 % — SIGNIFICANT CHANGE UP (ref 0–2)
DACRYOCYTES BLD QL SMEAR: SLIGHT — SIGNIFICANT CHANGE UP
ELLIPTOCYTES BLD QL SMEAR: SLIGHT — SIGNIFICANT CHANGE UP
EOSINOPHIL # BLD AUTO: 0.02 K/UL — SIGNIFICANT CHANGE UP (ref 0–0.5)
EOSINOPHIL # BLD MANUAL: 0 K/UL — SIGNIFICANT CHANGE UP (ref 0–0.5)
EOSINOPHIL NFR BLD AUTO: 0.2 % — SIGNIFICANT CHANGE UP (ref 0–6)
EOSINOPHIL NFR BLD MANUAL: 0 % — SIGNIFICANT CHANGE UP (ref 0–6)
HCT VFR BLD CALC: 27.2 % — LOW (ref 34.5–45)
HGB BLD-MCNC: 8.2 G/DL — LOW (ref 11.5–15.5)
HYPOCHROMIA BLD QL: SLIGHT — SIGNIFICANT CHANGE UP
IMM GRANULOCYTES # BLD AUTO: 0.13 K/UL — HIGH (ref 0–0.07)
IMM GRANULOCYTES NFR BLD AUTO: 1.1 % — HIGH (ref 0–0.9)
LG PLATELETS BLD QL AUTO: SLIGHT — SIGNIFICANT CHANGE UP
LYMPHOCYTES # BLD AUTO: 0.43 K/UL — LOW (ref 1–3.3)
LYMPHOCYTES # BLD MANUAL: 0.36 K/UL — LOW (ref 1–3.3)
LYMPHOCYTES NFR BLD AUTO: 3.6 % — LOW (ref 13–44)
LYMPHOCYTES NFR BLD MANUAL: 3 % — LOW (ref 13–44)
MACROCYTES BLD QL: SLIGHT — SIGNIFICANT CHANGE UP
MANUAL NEUTROPHIL BANDS #: 0.12 K/UL — SIGNIFICANT CHANGE UP (ref 0–0.84)
MANUAL SMEAR VERIFICATION: SIGNIFICANT CHANGE UP
MCHC RBC-ENTMCNC: 25.7 PG — LOW (ref 27–34)
MCHC RBC-ENTMCNC: 30.1 G/DL — LOW (ref 32–36)
MCV RBC AUTO: 85.3 FL — SIGNIFICANT CHANGE UP (ref 80–100)
MICROCYTES BLD QL: SLIGHT — SIGNIFICANT CHANGE UP
MONOCYTES # BLD AUTO: 0.76 K/UL — SIGNIFICANT CHANGE UP (ref 0–0.9)
MONOCYTES # BLD MANUAL: 0.36 K/UL — SIGNIFICANT CHANGE UP (ref 0–0.9)
MONOCYTES NFR BLD AUTO: 6.4 % — SIGNIFICANT CHANGE UP (ref 2–14)
MONOCYTES NFR BLD MANUAL: 3 % — SIGNIFICANT CHANGE UP (ref 2–14)
NEUTROPHILS # BLD AUTO: 10.53 K/UL — HIGH (ref 1.8–7.4)
NEUTROPHILS # BLD MANUAL: 11.08 K/UL — HIGH (ref 1.8–7.4)
NEUTROPHILS NFR BLD AUTO: 88.4 % — HIGH (ref 43–77)
NEUTROPHILS NFR BLD MANUAL: 93 % — HIGH (ref 43–77)
NEUTS BAND # BLD: 1 % — SIGNIFICANT CHANGE UP (ref 0–8)
NEUTS BAND NFR BLD: 1 % — SIGNIFICANT CHANGE UP (ref 0–8)
NRBC # BLD AUTO: 0.02 K/UL — HIGH (ref 0–0)
NRBC # FLD: 0.02 K/UL — HIGH (ref 0–0)
NRBC BLD AUTO-RTO: 0 /100 WBCS — SIGNIFICANT CHANGE UP (ref 0–0)
OVALOCYTES BLD QL SMEAR: SLIGHT — SIGNIFICANT CHANGE UP
PLAT MORPH BLD: NORMAL — SIGNIFICANT CHANGE UP
PLATELET # BLD AUTO: 289 K/UL — SIGNIFICANT CHANGE UP (ref 150–400)
PMV BLD: 10.4 FL — SIGNIFICANT CHANGE UP (ref 7–13)
POLYCHROMASIA BLD QL SMEAR: SLIGHT — SIGNIFICANT CHANGE UP
RBC # BLD: 3.19 M/UL — LOW (ref 3.8–5.2)
RBC # FLD: 23.4 % — HIGH (ref 10.3–14.5)
RBC BLD AUTO: SIGNIFICANT CHANGE UP
STOMATOCYTES BLD QL SMEAR: SLIGHT — SIGNIFICANT CHANGE UP
WBC # BLD: 11.91 K/UL — HIGH (ref 3.8–10.5)
WBC # FLD AUTO: 11.91 K/UL — HIGH (ref 3.8–10.5)

## 2025-06-24 ENCOUNTER — APPOINTMENT (OUTPATIENT)
Dept: PSYCHIATRY | Facility: CLINIC | Age: 55
End: 2025-06-24

## 2025-06-24 LAB
ALBUMIN SERPL ELPH-MCNC: 3.6 G/DL — SIGNIFICANT CHANGE UP (ref 3.3–5)
ALP SERPL-CCNC: 106 U/L — SIGNIFICANT CHANGE UP (ref 40–120)
ALT FLD-CCNC: 20 U/L — SIGNIFICANT CHANGE UP (ref 10–40)
ANION GAP SERPL CALC-SCNC: 16 MMOL/L — SIGNIFICANT CHANGE UP (ref 5–17)
AST SERPL-CCNC: 20 U/L — SIGNIFICANT CHANGE UP (ref 10–35)
BILIRUB SERPL-MCNC: 0.5 MG/DL — SIGNIFICANT CHANGE UP (ref 0.2–1.2)
BUN SERPL-MCNC: 14 MG/DL — SIGNIFICANT CHANGE UP (ref 7–23)
CALCIUM SERPL-MCNC: 8.4 MG/DL — SIGNIFICANT CHANGE UP (ref 8.4–10.5)
CHLORIDE SERPL-SCNC: 100 MMOL/L — SIGNIFICANT CHANGE UP (ref 96–108)
CO2 SERPL-SCNC: 22 MMOL/L — SIGNIFICANT CHANGE UP (ref 22–31)
CREAT SERPL-MCNC: 0.93 MG/DL — SIGNIFICANT CHANGE UP (ref 0.5–1.3)
EGFR: 73 ML/MIN/1.73M2 — SIGNIFICANT CHANGE UP
EGFR: 73 ML/MIN/1.73M2 — SIGNIFICANT CHANGE UP
GLUCOSE SERPL-MCNC: 104 MG/DL — HIGH (ref 70–99)
LDH SERPL L TO P-CCNC: 213 U/L — SIGNIFICANT CHANGE UP (ref 50–242)
POTASSIUM SERPL-MCNC: 3.9 MMOL/L — SIGNIFICANT CHANGE UP (ref 3.5–5.3)
POTASSIUM SERPL-SCNC: 3.9 MMOL/L — SIGNIFICANT CHANGE UP (ref 3.5–5.3)
PROT SERPL-MCNC: 5.8 G/DL — LOW (ref 6–8.3)
SODIUM SERPL-SCNC: 138 MMOL/L — SIGNIFICANT CHANGE UP (ref 135–145)

## 2025-06-24 PROCEDURE — 90832 PSYTX W PT 30 MINUTES: CPT | Mod: 95

## 2025-06-25 RX ORDER — PANTOPRAZOLE 40 MG/1
40 TABLET, DELAYED RELEASE ORAL DAILY
Qty: 1 | Refills: 1 | Status: ACTIVE | COMMUNITY
Start: 2025-06-25 | End: 1900-01-01

## 2025-06-27 ENCOUNTER — RESULT REVIEW (OUTPATIENT)
Age: 55
End: 2025-06-27

## 2025-06-27 ENCOUNTER — APPOINTMENT (OUTPATIENT)
Age: 55
End: 2025-06-27

## 2025-06-27 LAB
ALBUMIN SERPL ELPH-MCNC: 3.4 G/DL — SIGNIFICANT CHANGE UP (ref 3.3–5)
ALP SERPL-CCNC: 93 U/L — SIGNIFICANT CHANGE UP (ref 40–120)
ALT FLD-CCNC: 19 U/L — SIGNIFICANT CHANGE UP (ref 10–40)
ANION GAP SERPL CALC-SCNC: 13 MMOL/L — SIGNIFICANT CHANGE UP (ref 5–17)
ANISOCYTOSIS BLD QL: SLIGHT — SIGNIFICANT CHANGE UP
AST SERPL-CCNC: 26 U/L — SIGNIFICANT CHANGE UP (ref 10–35)
BASOPHILS # BLD AUTO: 0.03 K/UL — SIGNIFICANT CHANGE UP (ref 0–0.2)
BASOPHILS # BLD MANUAL: 0.09 K/UL — SIGNIFICANT CHANGE UP (ref 0–0.2)
BASOPHILS NFR BLD AUTO: 0.3 % — SIGNIFICANT CHANGE UP (ref 0–2)
BASOPHILS NFR BLD MANUAL: 1 % — SIGNIFICANT CHANGE UP (ref 0–2)
BILIRUB SERPL-MCNC: 0.4 MG/DL — SIGNIFICANT CHANGE UP (ref 0.2–1.2)
BUN SERPL-MCNC: 14 MG/DL — SIGNIFICANT CHANGE UP (ref 7–23)
CALCIUM SERPL-MCNC: 9.2 MG/DL — SIGNIFICANT CHANGE UP (ref 8.4–10.5)
CHLORIDE SERPL-SCNC: 103 MMOL/L — SIGNIFICANT CHANGE UP (ref 96–108)
CO2 SERPL-SCNC: 22 MMOL/L — SIGNIFICANT CHANGE UP (ref 22–31)
CREAT SERPL-MCNC: 0.95 MG/DL — SIGNIFICANT CHANGE UP (ref 0.5–1.3)
EGFR: 71 ML/MIN/1.73M2 — SIGNIFICANT CHANGE UP
EGFR: 71 ML/MIN/1.73M2 — SIGNIFICANT CHANGE UP
EOSINOPHIL # BLD AUTO: 0.02 K/UL — SIGNIFICANT CHANGE UP (ref 0–0.5)
EOSINOPHIL # BLD MANUAL: 0 K/UL — SIGNIFICANT CHANGE UP (ref 0–0.5)
EOSINOPHIL NFR BLD AUTO: 0.2 % — SIGNIFICANT CHANGE UP (ref 0–6)
EOSINOPHIL NFR BLD MANUAL: 0 % — SIGNIFICANT CHANGE UP (ref 0–6)
GLUCOSE SERPL-MCNC: 109 MG/DL — HIGH (ref 70–99)
HCT VFR BLD CALC: 30.5 % — LOW (ref 34.5–45)
HGB BLD-MCNC: 9.4 G/DL — LOW (ref 11.5–15.5)
HYPOCHROMIA BLD QL: SLIGHT — SIGNIFICANT CHANGE UP
IMM GRANULOCYTES # BLD AUTO: 0.06 K/UL — SIGNIFICANT CHANGE UP (ref 0–0.07)
IMM GRANULOCYTES NFR BLD AUTO: 0.7 % — SIGNIFICANT CHANGE UP (ref 0–0.9)
LDH SERPL L TO P-CCNC: 305 U/L — HIGH (ref 50–242)
LG PLATELETS BLD QL AUTO: SLIGHT — SIGNIFICANT CHANGE UP
LYMPHOCYTES # BLD AUTO: 0.44 K/UL — LOW (ref 1–3.3)
LYMPHOCYTES # BLD MANUAL: 0.37 K/UL — LOW (ref 1–3.3)
LYMPHOCYTES NFR BLD AUTO: 4.8 % — LOW (ref 13–44)
LYMPHOCYTES NFR BLD MANUAL: 4 % — LOW (ref 13–44)
MACROCYTES BLD QL: SLIGHT — SIGNIFICANT CHANGE UP
MANUAL SMEAR VERIFICATION: SIGNIFICANT CHANGE UP
MCHC RBC-ENTMCNC: 26.3 PG — LOW (ref 27–34)
MCHC RBC-ENTMCNC: 30.8 G/DL — LOW (ref 32–36)
MCV RBC AUTO: 85.4 FL — SIGNIFICANT CHANGE UP (ref 80–100)
MICROCYTES BLD QL: SLIGHT — SIGNIFICANT CHANGE UP
MONOCYTES # BLD AUTO: 0.48 K/UL — SIGNIFICANT CHANGE UP (ref 0–0.9)
MONOCYTES # BLD MANUAL: 0.28 K/UL — SIGNIFICANT CHANGE UP (ref 0–0.9)
MONOCYTES NFR BLD AUTO: 5.2 % — SIGNIFICANT CHANGE UP (ref 2–14)
MONOCYTES NFR BLD MANUAL: 3 % — SIGNIFICANT CHANGE UP (ref 2–14)
NEUTROPHILS # BLD AUTO: 8.16 K/UL — HIGH (ref 1.8–7.4)
NEUTROPHILS # BLD MANUAL: 8.45 K/UL — HIGH (ref 1.8–7.4)
NEUTROPHILS NFR BLD AUTO: 88.8 % — HIGH (ref 43–77)
NEUTROPHILS NFR BLD MANUAL: 92 % — HIGH (ref 43–77)
NRBC # BLD AUTO: 0.02 K/UL — HIGH (ref 0–0)
NRBC # FLD: 0.02 K/UL — HIGH (ref 0–0)
NRBC BLD AUTO-RTO: 0 /100 WBCS — SIGNIFICANT CHANGE UP (ref 0–0)
PLAT MORPH BLD: NORMAL — SIGNIFICANT CHANGE UP
PLATELET # BLD AUTO: 337 K/UL — SIGNIFICANT CHANGE UP (ref 150–400)
PMV BLD: 8.9 FL — SIGNIFICANT CHANGE UP (ref 7–13)
POLYCHROMASIA BLD QL SMEAR: SLIGHT — SIGNIFICANT CHANGE UP
POTASSIUM SERPL-MCNC: 3.8 MMOL/L — SIGNIFICANT CHANGE UP (ref 3.5–5.3)
POTASSIUM SERPL-SCNC: 3.8 MMOL/L — SIGNIFICANT CHANGE UP (ref 3.5–5.3)
PROT SERPL-MCNC: 6.3 G/DL — SIGNIFICANT CHANGE UP (ref 6–8.3)
RBC # BLD: 3.57 M/UL — LOW (ref 3.8–5.2)
RBC # FLD: 21.8 % — HIGH (ref 10.3–14.5)
RBC BLD AUTO: SIGNIFICANT CHANGE UP
SODIUM SERPL-SCNC: 138 MMOL/L — SIGNIFICANT CHANGE UP (ref 135–145)
WBC # BLD: 9.19 K/UL — SIGNIFICANT CHANGE UP (ref 3.8–10.5)
WBC # FLD AUTO: 9.19 K/UL — SIGNIFICANT CHANGE UP (ref 3.8–10.5)

## 2025-06-30 ENCOUNTER — APPOINTMENT (OUTPATIENT)
Dept: HEMATOLOGY ONCOLOGY | Facility: CLINIC | Age: 55
End: 2025-06-30

## 2025-06-30 ENCOUNTER — APPOINTMENT (OUTPATIENT)
Age: 55
End: 2025-06-30

## 2025-06-30 ENCOUNTER — RESULT REVIEW (OUTPATIENT)
Age: 55
End: 2025-06-30

## 2025-06-30 VITALS
BODY MASS INDEX: 41.78 KG/M2 | OXYGEN SATURATION: 95 % | SYSTOLIC BLOOD PRESSURE: 162 MMHG | WEIGHT: 260 LBS | HEIGHT: 66 IN | TEMPERATURE: 97.7 F | DIASTOLIC BLOOD PRESSURE: 73 MMHG | HEART RATE: 105 BPM

## 2025-06-30 DIAGNOSIS — Z51.89 ENCOUNTER FOR OTHER SPECIFIED AFTERCARE: ICD-10-CM

## 2025-06-30 LAB
ANISOCYTOSIS BLD QL: SLIGHT — SIGNIFICANT CHANGE UP
BASOPHILS # BLD AUTO: 0.13 K/UL — SIGNIFICANT CHANGE UP (ref 0–0.2)
BASOPHILS # BLD MANUAL: 0 K/UL — SIGNIFICANT CHANGE UP (ref 0–0.2)
BASOPHILS NFR BLD AUTO: 0.3 % — SIGNIFICANT CHANGE UP (ref 0–2)
BASOPHILS NFR BLD MANUAL: 0 % — SIGNIFICANT CHANGE UP (ref 0–2)
EOSINOPHIL # BLD AUTO: 0 K/UL — SIGNIFICANT CHANGE UP (ref 0–0.5)
EOSINOPHIL # BLD MANUAL: 0 K/UL — SIGNIFICANT CHANGE UP (ref 0–0.5)
EOSINOPHIL NFR BLD AUTO: 0 % — SIGNIFICANT CHANGE UP (ref 0–6)
EOSINOPHIL NFR BLD MANUAL: 0 % — SIGNIFICANT CHANGE UP (ref 0–6)
HCT VFR BLD CALC: 34.2 % — LOW (ref 34.5–45)
HGB BLD-MCNC: 10.6 G/DL — LOW (ref 11.5–15.5)
IMM GRANULOCYTES # BLD AUTO: 4.56 K/UL — HIGH (ref 0–0.07)
IMM GRANULOCYTES NFR BLD AUTO: 11.7 % — HIGH (ref 0–0.9)
LG PLATELETS BLD QL AUTO: SLIGHT — SIGNIFICANT CHANGE UP
LYMPHOCYTES # BLD AUTO: 0.35 K/UL — LOW (ref 1–3.3)
LYMPHOCYTES # BLD MANUAL: 0.78 K/UL — LOW (ref 1–3.3)
LYMPHOCYTES NFR BLD AUTO: 0.9 % — LOW (ref 13–44)
LYMPHOCYTES NFR BLD MANUAL: 2 % — LOW (ref 13–44)
MACROCYTES BLD QL: SLIGHT — SIGNIFICANT CHANGE UP
MANUAL NEUTROPHIL BANDS #: 4.29 K/UL — HIGH (ref 0–0.84)
MANUAL SMEAR VERIFICATION: YES — SIGNIFICANT CHANGE UP
MCHC RBC-ENTMCNC: 25.9 PG — LOW (ref 27–34)
MCHC RBC-ENTMCNC: 31 G/DL — LOW (ref 32–36)
MCV RBC AUTO: 83.6 FL — SIGNIFICANT CHANGE UP (ref 80–100)
MICROCYTES BLD QL: SLIGHT — SIGNIFICANT CHANGE UP
MONOCYTES # BLD AUTO: 0.45 K/UL — SIGNIFICANT CHANGE UP (ref 0–0.9)
MONOCYTES # BLD MANUAL: 0 K/UL — SIGNIFICANT CHANGE UP (ref 0–0.9)
MONOCYTES NFR BLD AUTO: 1.2 % — LOW (ref 2–14)
MONOCYTES NFR BLD MANUAL: 0 % — LOW (ref 2–14)
NEUTROPHILS # BLD AUTO: 33.51 K/UL — HIGH (ref 1.8–7.4)
NEUTROPHILS # BLD MANUAL: 33.93 K/UL — HIGH (ref 1.8–7.4)
NEUTROPHILS NFR BLD AUTO: 85.9 % — HIGH (ref 43–77)
NEUTROPHILS NFR BLD MANUAL: 87 % — HIGH (ref 43–77)
NEUTS BAND # BLD: 11 % — HIGH (ref 0–8)
NEUTS BAND NFR BLD: 11 % — HIGH (ref 0–8)
NRBC # BLD AUTO: 0 K/UL — SIGNIFICANT CHANGE UP (ref 0–0)
NRBC # FLD: 0 K/UL — SIGNIFICANT CHANGE UP (ref 0–0)
NRBC BLD AUTO-RTO: 0 /100 WBCS — SIGNIFICANT CHANGE UP (ref 0–0)
PLAT MORPH BLD: NORMAL — SIGNIFICANT CHANGE UP
PLATELET # BLD AUTO: 392 K/UL — SIGNIFICANT CHANGE UP (ref 150–400)
PMV BLD: 9.1 FL — SIGNIFICANT CHANGE UP (ref 7–13)
RBC # BLD: 4.09 M/UL — SIGNIFICANT CHANGE UP (ref 3.8–5.2)
RBC # FLD: 21.1 % — HIGH (ref 10.3–14.5)
RBC BLD AUTO: SIGNIFICANT CHANGE UP
SMUDGE CELLS # BLD: PRESENT
WBC # BLD: 39 K/UL — HIGH (ref 3.8–10.5)
WBC # FLD AUTO: 39 K/UL — HIGH (ref 3.8–10.5)
WBC MORPHOLOGY: NORMAL — SIGNIFICANT CHANGE UP

## 2025-06-30 PROCEDURE — 99215 OFFICE O/P EST HI 40 MIN: CPT

## 2025-06-30 RX ORDER — SUCRALFATE 1 G/10ML
1 SUSPENSION ORAL 3 TIMES DAILY
Qty: 420 | Refills: 1 | Status: ACTIVE | COMMUNITY
Start: 2025-06-30 | End: 1900-01-01

## 2025-07-01 ENCOUNTER — APPOINTMENT (OUTPATIENT)
Dept: GERIATRICS | Facility: CLINIC | Age: 55
End: 2025-07-01

## 2025-07-01 ENCOUNTER — APPOINTMENT (OUTPATIENT)
Dept: PSYCHIATRY | Facility: CLINIC | Age: 55
End: 2025-07-01

## 2025-07-07 ENCOUNTER — RESULT REVIEW (OUTPATIENT)
Age: 55
End: 2025-07-07

## 2025-07-07 ENCOUNTER — APPOINTMENT (OUTPATIENT)
Age: 55
End: 2025-07-07

## 2025-07-07 LAB
ANISOCYTOSIS BLD QL: SLIGHT — SIGNIFICANT CHANGE UP
BASOPHILS # BLD AUTO: 0.07 K/UL — SIGNIFICANT CHANGE UP (ref 0–0.2)
BASOPHILS # BLD MANUAL: 0.05 K/UL — SIGNIFICANT CHANGE UP (ref 0–0.2)
BASOPHILS NFR BLD AUTO: 1.5 % — SIGNIFICANT CHANGE UP (ref 0–2)
BASOPHILS NFR BLD MANUAL: 1 % — SIGNIFICANT CHANGE UP (ref 0–2)
DACRYOCYTES BLD QL SMEAR: SLIGHT — SIGNIFICANT CHANGE UP
ELLIPTOCYTES BLD QL SMEAR: SLIGHT — SIGNIFICANT CHANGE UP
EOSINOPHIL # BLD AUTO: 0.16 K/UL — SIGNIFICANT CHANGE UP (ref 0–0.5)
EOSINOPHIL # BLD MANUAL: 0.09 K/UL — SIGNIFICANT CHANGE UP (ref 0–0.5)
EOSINOPHIL NFR BLD AUTO: 3.5 % — SIGNIFICANT CHANGE UP (ref 0–6)
EOSINOPHIL NFR BLD MANUAL: 2 % — SIGNIFICANT CHANGE UP (ref 0–6)
HCT VFR BLD CALC: 32.2 % — LOW (ref 34.5–45)
HGB BLD-MCNC: 9.7 G/DL — LOW (ref 11.5–15.5)
IMM GRANULOCYTES # BLD AUTO: 0.34 K/UL — HIGH (ref 0–0.07)
IMM GRANULOCYTES NFR BLD AUTO: 7.5 % — HIGH (ref 0–0.9)
LG PLATELETS BLD QL AUTO: SLIGHT — SIGNIFICANT CHANGE UP
LYMPHOCYTES # BLD AUTO: 0.48 K/UL — LOW (ref 1–3.3)
LYMPHOCYTES # BLD MANUAL: 0.69 K/UL — LOW (ref 1–3.3)
LYMPHOCYTES NFR BLD AUTO: 10.5 % — LOW (ref 13–44)
LYMPHOCYTES NFR BLD MANUAL: 15 % — SIGNIFICANT CHANGE UP (ref 13–44)
MACROCYTES BLD QL: SLIGHT — SIGNIFICANT CHANGE UP
MANUAL METAMYELOCYTE #: 0.14 K/UL — HIGH (ref 0–0)
MANUAL MYELOCYTE #: 0.09 K/UL — HIGH (ref 0–0)
MANUAL NEUTROPHIL BANDS #: 0.05 K/UL — SIGNIFICANT CHANGE UP (ref 0–0.84)
MANUAL PROMYELOCYTE #: 0.14 K/UL — HIGH (ref 0–0)
MANUAL SMEAR VERIFICATION: SIGNIFICANT CHANGE UP
MCHC RBC-ENTMCNC: 25.4 PG — LOW (ref 27–34)
MCHC RBC-ENTMCNC: 30.1 G/DL — LOW (ref 32–36)
MCV RBC AUTO: 84.3 FL — SIGNIFICANT CHANGE UP (ref 80–100)
METAMYELOCYTES # FLD: 3 % — HIGH (ref 0–0)
METAMYELOCYTES NFR BLD: 3 % — HIGH (ref 0–0)
MICROCYTES BLD QL: SLIGHT — SIGNIFICANT CHANGE UP
MONOCYTES # BLD AUTO: 0.54 K/UL — SIGNIFICANT CHANGE UP (ref 0–0.9)
MONOCYTES # BLD MANUAL: 0.74 K/UL — SIGNIFICANT CHANGE UP (ref 0–0.9)
MONOCYTES NFR BLD AUTO: 11.8 % — SIGNIFICANT CHANGE UP (ref 2–14)
MONOCYTES NFR BLD MANUAL: 16 % — HIGH (ref 2–14)
MYELOCYTES NFR BLD: 2 % — HIGH (ref 0–0)
NEUTROPHILS # BLD AUTO: 2.97 K/UL — SIGNIFICANT CHANGE UP (ref 1.8–7.4)
NEUTROPHILS # BLD MANUAL: 2.63 K/UL — SIGNIFICANT CHANGE UP (ref 1.8–7.4)
NEUTROPHILS NFR BLD AUTO: 65.2 % — SIGNIFICANT CHANGE UP (ref 43–77)
NEUTROPHILS NFR BLD MANUAL: 57 % — SIGNIFICANT CHANGE UP (ref 43–77)
NEUTS BAND # BLD: 1 % — SIGNIFICANT CHANGE UP (ref 0–8)
NEUTS BAND NFR BLD: 1 % — SIGNIFICANT CHANGE UP (ref 0–8)
NRBC # BLD AUTO: 0.06 K/UL — HIGH (ref 0–0)
NRBC # FLD: 0.06 K/UL — HIGH (ref 0–0)
NRBC BLD AUTO-RTO: 1 /100 WBCS — HIGH (ref 0–0)
OVALOCYTES BLD QL SMEAR: SLIGHT — SIGNIFICANT CHANGE UP
PLAT MORPH BLD: NORMAL — SIGNIFICANT CHANGE UP
PLATELET # BLD AUTO: 201 K/UL — SIGNIFICANT CHANGE UP (ref 150–400)
PMV BLD: 9.9 FL — SIGNIFICANT CHANGE UP (ref 7–13)
POLYCHROMASIA BLD QL SMEAR: SLIGHT — SIGNIFICANT CHANGE UP
PROMYELOCYTES # FLD: 3 % — HIGH (ref 0–0)
PROMYELOCYTES NFR BLD: 3 % — HIGH (ref 0–0)
RBC # BLD: 3.82 M/UL — SIGNIFICANT CHANGE UP (ref 3.8–5.2)
RBC # FLD: 21.4 % — HIGH (ref 10.3–14.5)
RBC BLD AUTO: SIGNIFICANT CHANGE UP
STOMATOCYTES BLD QL SMEAR: SLIGHT — SIGNIFICANT CHANGE UP
WBC # BLD: 4.62 K/UL — SIGNIFICANT CHANGE UP (ref 3.8–10.5)
WBC # FLD AUTO: 4.62 K/UL — SIGNIFICANT CHANGE UP (ref 3.8–10.5)

## 2025-07-08 ENCOUNTER — APPOINTMENT (OUTPATIENT)
Dept: GERIATRICS | Facility: CLINIC | Age: 55
End: 2025-07-08
Payer: MEDICAID

## 2025-07-08 ENCOUNTER — APPOINTMENT (OUTPATIENT)
Dept: PSYCHIATRY | Facility: CLINIC | Age: 55
End: 2025-07-08
Payer: MEDICAID

## 2025-07-08 PROBLEM — K12.30 MUCOSITIS: Status: ACTIVE | Noted: 2025-07-08

## 2025-07-08 PROCEDURE — 90832 PSYTX W PT 30 MINUTES: CPT | Mod: 95

## 2025-07-08 PROCEDURE — 99214 OFFICE O/P EST MOD 30 MIN: CPT | Mod: 95

## 2025-07-08 RX ORDER — DIPHENHYDRAMINE HYDROCHLORIDE AND LIDOCAINE HYDROCHLORIDE AND ALUMINUM HYDROXIDE AND MAGNESIUM HYDRO
KIT
Qty: 600 | Refills: 3 | Status: ACTIVE | COMMUNITY
Start: 2025-07-08 | End: 1900-01-01

## 2025-07-09 ENCOUNTER — NON-APPOINTMENT (OUTPATIENT)
Age: 55
End: 2025-07-09

## 2025-07-14 ENCOUNTER — RESULT REVIEW (OUTPATIENT)
Age: 55
End: 2025-07-14

## 2025-07-14 ENCOUNTER — APPOINTMENT (OUTPATIENT)
Age: 55
End: 2025-07-14

## 2025-07-14 LAB
ANISOCYTOSIS BLD QL: SLIGHT — SIGNIFICANT CHANGE UP
BASOPHILS # BLD AUTO: 0.05 K/UL — SIGNIFICANT CHANGE UP (ref 0–0.2)
BASOPHILS NFR BLD AUTO: 0.7 % — SIGNIFICANT CHANGE UP (ref 0–2)
EOSINOPHIL # BLD AUTO: 0.13 K/UL — SIGNIFICANT CHANGE UP (ref 0–0.5)
EOSINOPHIL NFR BLD AUTO: 1.7 % — SIGNIFICANT CHANGE UP (ref 0–6)
HCT VFR BLD CALC: 33.8 % — LOW (ref 34.5–45)
HGB BLD-MCNC: 10.3 G/DL — LOW (ref 11.5–15.5)
IMM GRANULOCYTES # BLD AUTO: 0.07 K/UL — SIGNIFICANT CHANGE UP (ref 0–0.07)
IMM GRANULOCYTES NFR BLD AUTO: 0.9 % — SIGNIFICANT CHANGE UP (ref 0–0.9)
LYMPHOCYTES # BLD AUTO: 0.43 K/UL — LOW (ref 1–3.3)
LYMPHOCYTES NFR BLD AUTO: 5.6 % — LOW (ref 13–44)
MANUAL SMEAR VERIFICATION: SIGNIFICANT CHANGE UP
MCHC RBC-ENTMCNC: 26 PG — LOW (ref 27–34)
MCHC RBC-ENTMCNC: 30.5 G/DL — LOW (ref 32–36)
MCV RBC AUTO: 85.4 FL — SIGNIFICANT CHANGE UP (ref 80–100)
MONOCYTES # BLD AUTO: 0.59 K/UL — SIGNIFICANT CHANGE UP (ref 0–0.9)
MONOCYTES NFR BLD AUTO: 7.7 % — SIGNIFICANT CHANGE UP (ref 2–14)
NEUTROPHILS # BLD AUTO: 6.38 K/UL — SIGNIFICANT CHANGE UP (ref 1.8–7.4)
NEUTROPHILS NFR BLD AUTO: 83.4 % — HIGH (ref 43–77)
NRBC # BLD AUTO: 0 K/UL — SIGNIFICANT CHANGE UP (ref 0–0)
NRBC # FLD: 0 K/UL — SIGNIFICANT CHANGE UP (ref 0–0)
NRBC BLD AUTO-RTO: 0 /100 WBCS — SIGNIFICANT CHANGE UP (ref 0–0)
PLAT MORPH BLD: NORMAL — SIGNIFICANT CHANGE UP
PLATELET # BLD AUTO: 290 K/UL — SIGNIFICANT CHANGE UP (ref 150–400)
PMV BLD: 10.2 FL — SIGNIFICANT CHANGE UP (ref 7–13)
POLYCHROMASIA BLD QL SMEAR: SLIGHT — SIGNIFICANT CHANGE UP
RBC # BLD: 3.96 M/UL — SIGNIFICANT CHANGE UP (ref 3.8–5.2)
RBC # FLD: 20.6 % — HIGH (ref 10.3–14.5)
RBC BLD AUTO: SIGNIFICANT CHANGE UP
WBC # BLD: 7.65 K/UL — SIGNIFICANT CHANGE UP (ref 3.8–10.5)
WBC # FLD AUTO: 7.65 K/UL — SIGNIFICANT CHANGE UP (ref 3.8–10.5)
WBC MORPHOLOGY: NORMAL — SIGNIFICANT CHANGE UP

## 2025-07-15 ENCOUNTER — APPOINTMENT (OUTPATIENT)
Dept: PSYCHIATRY | Facility: CLINIC | Age: 55
End: 2025-07-15

## 2025-07-15 LAB
ALBUMIN SERPL ELPH-MCNC: 3.5 G/DL
ALP BLD-CCNC: 86 U/L
ALT SERPL-CCNC: 18 U/L
ANION GAP SERPL CALC-SCNC: 14 MMOL/L
AST SERPL-CCNC: 21 U/L
BILIRUB SERPL-MCNC: 0.2 MG/DL
BUN SERPL-MCNC: 10 MG/DL
CALCIUM SERPL-MCNC: 8.9 MG/DL
CHLORIDE SERPL-SCNC: 103 MMOL/L
CO2 SERPL-SCNC: 24 MMOL/L
CREAT SERPL-MCNC: 0.95 MG/DL
EGFRCR SERPLBLD CKD-EPI 2021: 71 ML/MIN/1.73M2
GLUCOSE SERPL-MCNC: 114 MG/DL
POTASSIUM SERPL-SCNC: 3.8 MMOL/L
PROT SERPL-MCNC: 5.8 G/DL
SODIUM SERPL-SCNC: 140 MMOL/L

## 2025-07-21 ENCOUNTER — APPOINTMENT (OUTPATIENT)
Dept: HEMATOLOGY ONCOLOGY | Facility: CLINIC | Age: 55
End: 2025-07-21

## 2025-07-22 ENCOUNTER — APPOINTMENT (OUTPATIENT)
Dept: PSYCHIATRY | Facility: CLINIC | Age: 55
End: 2025-07-22

## 2025-07-28 ENCOUNTER — APPOINTMENT (OUTPATIENT)
Age: 55
End: 2025-07-28

## 2025-07-28 ENCOUNTER — RESULT REVIEW (OUTPATIENT)
Age: 55
End: 2025-07-28

## 2025-07-28 LAB
ALBUMIN SERPL ELPH-MCNC: 3.8 G/DL — SIGNIFICANT CHANGE UP (ref 3.3–5)
ALP SERPL-CCNC: 89 U/L — SIGNIFICANT CHANGE UP (ref 40–120)
ALT FLD-CCNC: 18 U/L — SIGNIFICANT CHANGE UP (ref 10–40)
ANION GAP SERPL CALC-SCNC: 15 MMOL/L — SIGNIFICANT CHANGE UP (ref 5–17)
AST SERPL-CCNC: 30 U/L — SIGNIFICANT CHANGE UP (ref 10–35)
BASOPHILS # BLD AUTO: 0.07 K/UL — SIGNIFICANT CHANGE UP (ref 0–0.2)
BASOPHILS NFR BLD AUTO: 0.6 % — SIGNIFICANT CHANGE UP (ref 0–2)
BILIRUB SERPL-MCNC: 0.2 MG/DL — SIGNIFICANT CHANGE UP (ref 0.2–1.2)
BUN SERPL-MCNC: 14 MG/DL — SIGNIFICANT CHANGE UP (ref 7–23)
CALCIUM SERPL-MCNC: 9.4 MG/DL — SIGNIFICANT CHANGE UP (ref 8.4–10.5)
CHLORIDE SERPL-SCNC: 103 MMOL/L — SIGNIFICANT CHANGE UP (ref 96–108)
CO2 SERPL-SCNC: 20 MMOL/L — LOW (ref 22–31)
CREAT SERPL-MCNC: 0.96 MG/DL — SIGNIFICANT CHANGE UP (ref 0.5–1.3)
EGFR: 70 ML/MIN/1.73M2 — SIGNIFICANT CHANGE UP
EGFR: 70 ML/MIN/1.73M2 — SIGNIFICANT CHANGE UP
EOSINOPHIL # BLD AUTO: 0.98 K/UL — HIGH (ref 0–0.5)
EOSINOPHIL NFR BLD AUTO: 8.8 % — HIGH (ref 0–6)
GLUCOSE SERPL-MCNC: 106 MG/DL — HIGH (ref 70–99)
HCT VFR BLD CALC: 37.4 % — SIGNIFICANT CHANGE UP (ref 34.5–45)
HGB BLD-MCNC: 11.2 G/DL — LOW (ref 11.5–15.5)
IMM GRANULOCYTES # BLD AUTO: 0.06 K/UL — SIGNIFICANT CHANGE UP (ref 0–0.07)
IMM GRANULOCYTES NFR BLD AUTO: 0.5 % — SIGNIFICANT CHANGE UP (ref 0–0.9)
LDH SERPL L TO P-CCNC: 347 U/L — HIGH (ref 50–242)
LYMPHOCYTES # BLD AUTO: 0.99 K/UL — LOW (ref 1–3.3)
LYMPHOCYTES NFR BLD AUTO: 8.9 % — LOW (ref 13–44)
MCHC RBC-ENTMCNC: 25.2 PG — LOW (ref 27–34)
MCHC RBC-ENTMCNC: 29.9 G/DL — LOW (ref 32–36)
MCV RBC AUTO: 84 FL — SIGNIFICANT CHANGE UP (ref 80–100)
MONOCYTES # BLD AUTO: 0.73 K/UL — SIGNIFICANT CHANGE UP (ref 0–0.9)
MONOCYTES NFR BLD AUTO: 6.6 % — SIGNIFICANT CHANGE UP (ref 2–14)
NEUTROPHILS # BLD AUTO: 8.29 K/UL — HIGH (ref 1.8–7.4)
NEUTROPHILS NFR BLD AUTO: 74.6 % — SIGNIFICANT CHANGE UP (ref 43–77)
NRBC # BLD AUTO: 0 K/UL — SIGNIFICANT CHANGE UP (ref 0–0)
NRBC # FLD: 0 K/UL — SIGNIFICANT CHANGE UP (ref 0–0)
NRBC BLD AUTO-RTO: 0 /100 WBCS — SIGNIFICANT CHANGE UP (ref 0–0)
PLATELET # BLD AUTO: 302 K/UL — SIGNIFICANT CHANGE UP (ref 150–400)
PMV BLD: 9.2 FL — SIGNIFICANT CHANGE UP (ref 7–13)
POTASSIUM SERPL-MCNC: 3.6 MMOL/L — SIGNIFICANT CHANGE UP (ref 3.5–5.3)
POTASSIUM SERPL-SCNC: 3.6 MMOL/L — SIGNIFICANT CHANGE UP (ref 3.5–5.3)
PROT SERPL-MCNC: 6.4 G/DL — SIGNIFICANT CHANGE UP (ref 6–8.3)
RBC # BLD: 4.45 M/UL — SIGNIFICANT CHANGE UP (ref 3.8–5.2)
RBC # FLD: 18.8 % — HIGH (ref 10.3–14.5)
SODIUM SERPL-SCNC: 138 MMOL/L — SIGNIFICANT CHANGE UP (ref 135–145)
WBC # BLD: 11.12 K/UL — HIGH (ref 3.8–10.5)
WBC # FLD AUTO: 11.12 K/UL — HIGH (ref 3.8–10.5)

## 2025-07-29 ENCOUNTER — APPOINTMENT (OUTPATIENT)
Dept: PSYCHIATRY | Facility: CLINIC | Age: 55
End: 2025-07-29

## 2025-08-04 ENCOUNTER — RESULT REVIEW (OUTPATIENT)
Age: 55
End: 2025-08-04

## 2025-08-04 ENCOUNTER — APPOINTMENT (OUTPATIENT)
Dept: HEMATOLOGY ONCOLOGY | Facility: CLINIC | Age: 55
End: 2025-08-04

## 2025-08-04 ENCOUNTER — APPOINTMENT (OUTPATIENT)
Age: 55
End: 2025-08-04

## 2025-08-04 LAB
ALBUMIN SERPL ELPH-MCNC: 3.7 G/DL — SIGNIFICANT CHANGE UP (ref 3.3–5)
ALP SERPL-CCNC: 119 U/L — SIGNIFICANT CHANGE UP (ref 40–120)
ALT FLD-CCNC: 18 U/L — SIGNIFICANT CHANGE UP (ref 10–40)
ANION GAP SERPL CALC-SCNC: 13 MMOL/L — SIGNIFICANT CHANGE UP (ref 5–17)
ANISOCYTOSIS BLD QL: SLIGHT — SIGNIFICANT CHANGE UP
AST SERPL-CCNC: 20 U/L — SIGNIFICANT CHANGE UP (ref 10–35)
BASOPHILS # BLD AUTO: 0.03 K/UL — SIGNIFICANT CHANGE UP (ref 0–0.2)
BASOPHILS # BLD MANUAL: 0.03 K/UL — SIGNIFICANT CHANGE UP (ref 0–0.2)
BASOPHILS NFR BLD AUTO: 2.2 % — HIGH (ref 0–2)
BASOPHILS NFR BLD MANUAL: 2 % — SIGNIFICANT CHANGE UP (ref 0–2)
BILIRUB SERPL-MCNC: 0.2 MG/DL — SIGNIFICANT CHANGE UP (ref 0.2–1.2)
BUN SERPL-MCNC: 15 MG/DL — SIGNIFICANT CHANGE UP (ref 7–23)
CALCIUM SERPL-MCNC: 8.8 MG/DL — SIGNIFICANT CHANGE UP (ref 8.4–10.5)
CHLORIDE SERPL-SCNC: 103 MMOL/L — SIGNIFICANT CHANGE UP (ref 96–108)
CO2 SERPL-SCNC: 23 MMOL/L — SIGNIFICANT CHANGE UP (ref 22–31)
CREAT SERPL-MCNC: 0.7 MG/DL — SIGNIFICANT CHANGE UP (ref 0.5–1.3)
DOHLE BOD BLD QL SMEAR: PRESENT
EGFR: 103 ML/MIN/1.73M2 — SIGNIFICANT CHANGE UP
EGFR: 103 ML/MIN/1.73M2 — SIGNIFICANT CHANGE UP
EOSINOPHIL # BLD AUTO: 0.48 K/UL — SIGNIFICANT CHANGE UP (ref 0–0.5)
EOSINOPHIL # BLD MANUAL: 0.54 K/UL — HIGH (ref 0–0.5)
EOSINOPHIL NFR BLD AUTO: 35.8 % — HIGH (ref 0–6)
EOSINOPHIL NFR BLD MANUAL: 40 % — HIGH (ref 0–6)
GIANT PLATELETS BLD QL SMEAR: PRESENT
GLUCOSE SERPL-MCNC: 125 MG/DL — HIGH (ref 70–99)
HCT VFR BLD CALC: 32 % — LOW (ref 34.5–45)
HGB BLD-MCNC: 9.7 G/DL — LOW (ref 11.5–15.5)
IMM GRANULOCYTES # BLD AUTO: 0.01 K/UL — SIGNIFICANT CHANGE UP (ref 0–0.07)
IMM GRANULOCYTES NFR BLD AUTO: 0.7 % — SIGNIFICANT CHANGE UP (ref 0–0.9)
LDH SERPL L TO P-CCNC: 151 U/L — SIGNIFICANT CHANGE UP (ref 50–242)
LG PLATELETS BLD QL AUTO: SLIGHT — SIGNIFICANT CHANGE UP
LYMPHOCYTES # BLD AUTO: 0.26 K/UL — LOW (ref 1–3.3)
LYMPHOCYTES # BLD MANUAL: 0.19 K/UL — LOW (ref 1–3.3)
LYMPHOCYTES NFR BLD AUTO: 19.4 % — SIGNIFICANT CHANGE UP (ref 13–44)
LYMPHOCYTES NFR BLD MANUAL: 14 % — SIGNIFICANT CHANGE UP (ref 13–44)
MACROCYTES BLD QL: SLIGHT — SIGNIFICANT CHANGE UP
MANUAL METAMYELOCYTE #: 0.03 K/UL — HIGH (ref 0–0)
MANUAL SMEAR VERIFICATION: YES — SIGNIFICANT CHANGE UP
MCHC RBC-ENTMCNC: 24.9 PG — LOW (ref 27–34)
MCHC RBC-ENTMCNC: 30.3 G/DL — LOW (ref 32–36)
MCV RBC AUTO: 82.1 FL — SIGNIFICANT CHANGE UP (ref 80–100)
METAMYELOCYTES # FLD: 2 % — HIGH (ref 0–0)
METAMYELOCYTES NFR BLD: 2 % — HIGH (ref 0–0)
MICROCYTES BLD QL: SLIGHT — SIGNIFICANT CHANGE UP
MONOCYTES # BLD AUTO: 0.08 K/UL — SIGNIFICANT CHANGE UP (ref 0–0.9)
MONOCYTES # BLD MANUAL: 0.03 K/UL — SIGNIFICANT CHANGE UP (ref 0–0.9)
MONOCYTES NFR BLD AUTO: 6 % — SIGNIFICANT CHANGE UP (ref 2–14)
MONOCYTES NFR BLD MANUAL: 2 % — SIGNIFICANT CHANGE UP (ref 2–14)
NEUTROPHILS # BLD AUTO: 0.48 K/UL — LOW (ref 1.8–7.4)
NEUTROPHILS # BLD MANUAL: 0.54 K/UL — LOW (ref 1.8–7.4)
NEUTROPHILS NFR BLD AUTO: 35.9 % — LOW (ref 43–77)
NEUTROPHILS NFR BLD MANUAL: 40 % — LOW (ref 43–77)
NRBC # BLD AUTO: 0 K/UL — SIGNIFICANT CHANGE UP (ref 0–0)
NRBC # FLD: 0 K/UL — SIGNIFICANT CHANGE UP (ref 0–0)
NRBC BLD AUTO-RTO: 0 /100 WBCS — SIGNIFICANT CHANGE UP (ref 0–0)
PLAT MORPH BLD: NORMAL — SIGNIFICANT CHANGE UP
PLATELET # BLD AUTO: 163 K/UL — SIGNIFICANT CHANGE UP (ref 150–400)
PMV BLD: 10.5 FL — SIGNIFICANT CHANGE UP (ref 7–13)
POTASSIUM SERPL-MCNC: 3.6 MMOL/L — SIGNIFICANT CHANGE UP (ref 3.5–5.3)
POTASSIUM SERPL-SCNC: 3.6 MMOL/L — SIGNIFICANT CHANGE UP (ref 3.5–5.3)
PROT SERPL-MCNC: 5.9 G/DL — LOW (ref 6–8.3)
RBC # BLD: 3.9 M/UL — SIGNIFICANT CHANGE UP (ref 3.8–5.2)
RBC # FLD: 18.1 % — HIGH (ref 10.3–14.5)
RBC BLD AUTO: SIGNIFICANT CHANGE UP
SODIUM SERPL-SCNC: 138 MMOL/L — SIGNIFICANT CHANGE UP (ref 135–145)
TOXIC GRANULES BLD QL SMEAR: PRESENT
WBC # BLD: 1.34 K/UL — LOW (ref 3.8–10.5)
WBC # FLD AUTO: 1.34 K/UL — LOW (ref 3.8–10.5)

## 2025-08-04 PROCEDURE — 99214 OFFICE O/P EST MOD 30 MIN: CPT

## 2025-08-05 ENCOUNTER — APPOINTMENT (OUTPATIENT)
Dept: PSYCHIATRY | Facility: CLINIC | Age: 55
End: 2025-08-05

## 2025-08-07 ENCOUNTER — RX RENEWAL (OUTPATIENT)
Age: 55
End: 2025-08-07

## 2025-08-11 ENCOUNTER — APPOINTMENT (OUTPATIENT)
Dept: NUCLEAR MEDICINE | Facility: CLINIC | Age: 55
End: 2025-08-11
Payer: MEDICAID

## 2025-08-11 ENCOUNTER — APPOINTMENT (OUTPATIENT)
Age: 55
End: 2025-08-11

## 2025-08-11 ENCOUNTER — OUTPATIENT (OUTPATIENT)
Dept: OUTPATIENT SERVICES | Facility: HOSPITAL | Age: 55
LOS: 1 days | End: 2025-08-11

## 2025-08-11 DIAGNOSIS — Z98.89 OTHER SPECIFIED POSTPROCEDURAL STATES: Chronic | ICD-10-CM

## 2025-08-11 DIAGNOSIS — C83.30 DIFFUSE LARGE B-CELL LYMPHOMA, UNSPECIFIED SITE: ICD-10-CM

## 2025-08-11 DIAGNOSIS — Z90.49 ACQUIRED ABSENCE OF OTHER SPECIFIED PARTS OF DIGESTIVE TRACT: Chronic | ICD-10-CM

## 2025-08-11 PROCEDURE — 78815 PET IMAGE W/CT SKULL-THIGH: CPT | Mod: 26,PI

## 2025-08-12 ENCOUNTER — APPOINTMENT (OUTPATIENT)
Dept: PSYCHIATRY | Facility: CLINIC | Age: 55
End: 2025-08-12

## 2025-08-18 ENCOUNTER — RESULT REVIEW (OUTPATIENT)
Age: 55
End: 2025-08-18

## 2025-08-18 ENCOUNTER — APPOINTMENT (OUTPATIENT)
Age: 55
End: 2025-08-18

## 2025-08-19 ENCOUNTER — APPOINTMENT (OUTPATIENT)
Dept: GERIATRICS | Facility: CLINIC | Age: 55
End: 2025-08-19

## 2025-08-19 DIAGNOSIS — C83.30 DIFFUSE LARGE B-CELL LYMPHOMA, UNSPECIFIED SITE: ICD-10-CM

## 2025-08-19 DIAGNOSIS — K59.00 CONSTIPATION, UNSPECIFIED: ICD-10-CM

## 2025-08-19 DIAGNOSIS — G89.3 NEOPLASM RELATED PAIN (ACUTE) (CHRONIC): ICD-10-CM

## 2025-08-19 DIAGNOSIS — Z51.5 ENCOUNTER FOR PALLIATIVE CARE: ICD-10-CM

## 2025-08-19 DIAGNOSIS — F41.9 ANXIETY DISORDER, UNSPECIFIED: ICD-10-CM

## 2025-08-19 DIAGNOSIS — F32.A ANXIETY DISORDER, UNSPECIFIED: ICD-10-CM

## 2025-08-19 DIAGNOSIS — K12.30 ORAL MUCOSITIS (ULCERATIVE), UNSPECIFIED: ICD-10-CM

## 2025-08-19 PROCEDURE — 99214 OFFICE O/P EST MOD 30 MIN: CPT | Mod: 95

## 2025-09-03 ENCOUNTER — APPOINTMENT (OUTPATIENT)
Dept: HEMATOLOGY ONCOLOGY | Facility: CLINIC | Age: 55
End: 2025-09-03

## 2025-09-03 VITALS
HEART RATE: 87 BPM | SYSTOLIC BLOOD PRESSURE: 113 MMHG | BODY MASS INDEX: 40.5 KG/M2 | HEIGHT: 66 IN | WEIGHT: 252 LBS | OXYGEN SATURATION: 96 % | TEMPERATURE: 97.9 F | DIASTOLIC BLOOD PRESSURE: 77 MMHG

## 2025-09-03 PROCEDURE — 99213 OFFICE O/P EST LOW 20 MIN: CPT

## 2025-09-08 ENCOUNTER — RESULT REVIEW (OUTPATIENT)
Age: 55
End: 2025-09-08

## 2025-09-08 ENCOUNTER — APPOINTMENT (OUTPATIENT)
Age: 55
End: 2025-09-08

## 2025-09-09 ENCOUNTER — LABORATORY RESULT (OUTPATIENT)
Age: 55
End: 2025-09-09

## 2025-09-09 ENCOUNTER — APPOINTMENT (OUTPATIENT)
Dept: RHEUMATOLOGY | Facility: CLINIC | Age: 55
End: 2025-09-09
Payer: MEDICAID

## 2025-09-09 VITALS
DIASTOLIC BLOOD PRESSURE: 96 MMHG | TEMPERATURE: 97.3 F | SYSTOLIC BLOOD PRESSURE: 161 MMHG | HEIGHT: 66 IN | WEIGHT: 251 LBS | OXYGEN SATURATION: 95 % | HEART RATE: 83 BPM | BODY MASS INDEX: 40.34 KG/M2

## 2025-09-09 DIAGNOSIS — M15.9 POLYOSTEOARTHRITIS, UNSPECIFIED: ICD-10-CM

## 2025-09-09 DIAGNOSIS — G62.89 OTHER SPECIFIED POLYNEUROPATHIES: ICD-10-CM

## 2025-09-09 PROCEDURE — 99204 OFFICE O/P NEW MOD 45 MIN: CPT | Mod: 25

## 2025-09-10 LAB
CCP AB SER IA-ACNC: <8 U/ML
CCP AB SER QL: NEGATIVE
CK SERPL-CCNC: 26 U/L
RHEUMATOID FACT SERPL-ACNC: <10 IU/ML
URATE SERPL-MCNC: 4.6 MG/DL

## 2025-09-11 LAB
CENTROMERE B AB SER-ACNC: <0.2 AL
DSDNA AB SER-ACNC: 2 IU/ML
ENA JO1 AB SER-ACNC: <0.2 AL
ENA RNP AB SER-ACNC: 0.2 AL
ENA SCL70 AB SER-ACNC: <0.2 AL
ENA SM AB SER-ACNC: <0.2 AL
ENA SS-A AB SER-ACNC: <0.2 AL
ENA SS-B AB SER-ACNC: <0.2 AL

## 2025-09-17 ENCOUNTER — RESULT REVIEW (OUTPATIENT)
Age: 55
End: 2025-09-17

## 2025-09-17 ENCOUNTER — APPOINTMENT (OUTPATIENT)
Dept: HEMATOLOGY ONCOLOGY | Facility: CLINIC | Age: 55
End: 2025-09-17
Payer: MEDICAID

## 2025-09-17 VITALS
BODY MASS INDEX: 40.5 KG/M2 | DIASTOLIC BLOOD PRESSURE: 68 MMHG | OXYGEN SATURATION: 93 % | HEART RATE: 119 BPM | WEIGHT: 252 LBS | SYSTOLIC BLOOD PRESSURE: 106 MMHG | TEMPERATURE: 97.7 F | HEIGHT: 66 IN

## 2025-09-17 PROCEDURE — 99214 OFFICE O/P EST MOD 30 MIN: CPT

## 2025-09-19 ENCOUNTER — APPOINTMENT (OUTPATIENT)
Dept: PHYSICAL MEDICINE AND REHAB | Facility: CLINIC | Age: 55
End: 2025-09-19